# Patient Record
Sex: MALE | Race: WHITE | NOT HISPANIC OR LATINO | Employment: OTHER | URBAN - METROPOLITAN AREA
[De-identification: names, ages, dates, MRNs, and addresses within clinical notes are randomized per-mention and may not be internally consistent; named-entity substitution may affect disease eponyms.]

---

## 2023-05-23 LAB
ALANINE AMINOTRANSFERASE (SGPT) (U/L) IN SER/PLAS: 10 U/L (ref 10–52)
ALBUMIN (G/DL) IN SER/PLAS: 4.1 G/DL (ref 3.4–5)
ALKALINE PHOSPHATASE (U/L) IN SER/PLAS: 71 U/L (ref 33–136)
ANION GAP IN SER/PLAS: 12 MMOL/L (ref 10–20)
ASPARTATE AMINOTRANSFERASE (SGOT) (U/L) IN SER/PLAS: 14 U/L (ref 9–39)
BILIRUBIN TOTAL (MG/DL) IN SER/PLAS: 0.6 MG/DL (ref 0–1.2)
CALCIUM (MG/DL) IN SER/PLAS: 9 MG/DL (ref 8.6–10.3)
CARBON DIOXIDE, TOTAL (MMOL/L) IN SER/PLAS: 25 MMOL/L (ref 21–32)
CHLORIDE (MMOL/L) IN SER/PLAS: 108 MMOL/L (ref 98–107)
CHOLESTEROL (MG/DL) IN SER/PLAS: 149 MG/DL (ref 0–199)
CHOLESTEROL IN HDL (MG/DL) IN SER/PLAS: 42.3 MG/DL
CHOLESTEROL/HDL RATIO: 3.5
CREATININE (MG/DL) IN SER/PLAS: 0.92 MG/DL (ref 0.5–1.3)
GFR MALE: 84 ML/MIN/1.73M2
GLUCOSE (MG/DL) IN SER/PLAS: 84 MG/DL (ref 74–99)
LDL: 80 MG/DL (ref 0–99)
POTASSIUM (MMOL/L) IN SER/PLAS: 4.5 MMOL/L (ref 3.5–5.3)
PROSTATE SPECIFIC AG (NG/ML) IN SER/PLAS: 18.88 NG/ML (ref 0–4)
PROTEIN TOTAL: 7 G/DL (ref 6.4–8.2)
SODIUM (MMOL/L) IN SER/PLAS: 140 MMOL/L (ref 136–145)
TRIGLYCERIDE (MG/DL) IN SER/PLAS: 134 MG/DL (ref 0–149)
UREA NITROGEN (MG/DL) IN SER/PLAS: 23 MG/DL (ref 6–23)
VLDL: 27 MG/DL (ref 0–40)

## 2023-06-02 RX ORDER — GABAPENTIN 300 MG/1
300 CAPSULE ORAL 4 TIMES DAILY
COMMUNITY
Start: 2021-07-27

## 2023-06-02 RX ORDER — SIMVASTATIN 20 MG/1
1 TABLET, FILM COATED ORAL DAILY
COMMUNITY
Start: 2016-12-12 | End: 2023-06-05 | Stop reason: SDUPTHER

## 2023-06-02 RX ORDER — TAMSULOSIN HYDROCHLORIDE 0.4 MG/1
1 CAPSULE ORAL DAILY
COMMUNITY
Start: 2020-05-20 | End: 2024-02-08

## 2023-06-05 ENCOUNTER — OFFICE VISIT (OUTPATIENT)
Dept: PRIMARY CARE | Facility: CLINIC | Age: 78
End: 2023-06-05
Payer: MEDICARE

## 2023-06-05 VITALS
HEART RATE: 76 BPM | WEIGHT: 171.2 LBS | HEIGHT: 67 IN | BODY MASS INDEX: 26.87 KG/M2 | TEMPERATURE: 97.9 F | DIASTOLIC BLOOD PRESSURE: 60 MMHG | SYSTOLIC BLOOD PRESSURE: 120 MMHG | OXYGEN SATURATION: 95 %

## 2023-06-05 DIAGNOSIS — N13.8 BENIGN PROSTATIC HYPERPLASIA WITH URINARY OBSTRUCTION AND OTHER LOWER URINARY TRACT SYMPTOMS: ICD-10-CM

## 2023-06-05 DIAGNOSIS — R33.9 URINARY RETENTION WITH INCOMPLETE BLADDER EMPTYING: ICD-10-CM

## 2023-06-05 DIAGNOSIS — R97.20 ELEVATED PSA: ICD-10-CM

## 2023-06-05 DIAGNOSIS — Z00.00 MEDICARE ANNUAL WELLNESS VISIT, SUBSEQUENT: Primary | ICD-10-CM

## 2023-06-05 DIAGNOSIS — J30.1 NON-SEASONAL ALLERGIC RHINITIS DUE TO POLLEN: ICD-10-CM

## 2023-06-05 DIAGNOSIS — E78.00 HYPERCHOLESTEROLEMIA: ICD-10-CM

## 2023-06-05 DIAGNOSIS — G62.89 OTHER POLYNEUROPATHY: ICD-10-CM

## 2023-06-05 DIAGNOSIS — N40.1 BENIGN PROSTATIC HYPERPLASIA WITH URINARY OBSTRUCTION AND OTHER LOWER URINARY TRACT SYMPTOMS: ICD-10-CM

## 2023-06-05 PROBLEM — M54.50 ACUTE LUMBAR BACK PAIN: Status: ACTIVE | Noted: 2023-06-05

## 2023-06-05 PROBLEM — R26.2 DIFFICULTY WALKING: Status: ACTIVE | Noted: 2023-06-05

## 2023-06-05 PROBLEM — G62.9 PERIPHERAL NEUROPATHY: Status: ACTIVE | Noted: 2023-06-05

## 2023-06-05 PROBLEM — D35.1 PARATHYROID ADENOMA: Status: ACTIVE | Noted: 2023-06-05

## 2023-06-05 PROBLEM — M48.061 DEGENERATIVE LUMBAR SPINAL STENOSIS: Status: ACTIVE | Noted: 2023-06-05

## 2023-06-05 PROBLEM — J30.9 ALLERGIC RHINITIS: Status: ACTIVE | Noted: 2023-06-05

## 2023-06-05 PROCEDURE — G0439 PPPS, SUBSEQ VISIT: HCPCS | Performed by: INTERNAL MEDICINE

## 2023-06-05 PROCEDURE — 1170F FXNL STATUS ASSESSED: CPT | Performed by: INTERNAL MEDICINE

## 2023-06-05 PROCEDURE — 99214 OFFICE O/P EST MOD 30 MIN: CPT | Performed by: INTERNAL MEDICINE

## 2023-06-05 PROCEDURE — 1036F TOBACCO NON-USER: CPT | Performed by: INTERNAL MEDICINE

## 2023-06-05 RX ORDER — SIMVASTATIN 20 MG/1
20 TABLET, FILM COATED ORAL DAILY
Qty: 90 TABLET | Refills: 1 | Status: SHIPPED | OUTPATIENT
Start: 2023-06-05 | End: 2023-12-05 | Stop reason: SDUPTHER

## 2023-06-05 RX ORDER — FLUTICASONE PROPIONATE 50 MCG
1 SPRAY, SUSPENSION (ML) NASAL DAILY
COMMUNITY
End: 2023-06-05 | Stop reason: SDUPTHER

## 2023-06-05 RX ORDER — FLUTICASONE PROPIONATE 50 MCG
1 SPRAY, SUSPENSION (ML) NASAL DAILY
Qty: 48 G | Refills: 1 | Status: SHIPPED | OUTPATIENT
Start: 2023-06-05 | End: 2023-12-05 | Stop reason: SDUPTHER

## 2023-06-05 ASSESSMENT — ENCOUNTER SYMPTOMS
CONSTITUTIONAL NEGATIVE: 1
NEUROLOGICAL NEGATIVE: 1
RESPIRATORY NEGATIVE: 1
ALLERGIC/IMMUNOLOGIC NEGATIVE: 1
ENDOCRINE NEGATIVE: 1
OCCASIONAL FEELINGS OF UNSTEADINESS: 0
DEPRESSION: 0
MUSCULOSKELETAL NEGATIVE: 1
GASTROINTESTINAL NEGATIVE: 1
EYES NEGATIVE: 1
PSYCHIATRIC NEGATIVE: 1
CARDIOVASCULAR NEGATIVE: 1
HEMATOLOGIC/LYMPHATIC NEGATIVE: 1
LOSS OF SENSATION IN FEET: 0

## 2023-06-05 ASSESSMENT — ACTIVITIES OF DAILY LIVING (ADL)
BATHING: INDEPENDENT
DRESSING: INDEPENDENT
DOING_HOUSEWORK: INDEPENDENT
TAKING_MEDICATION: INDEPENDENT
GROCERY_SHOPPING: INDEPENDENT
MANAGING_FINANCES: INDEPENDENT

## 2023-06-05 NOTE — PROGRESS NOTES
"Subjective   Patient ID: Benjamin Mae is a 78 y.o. male who presents for 6 month follow up, Medicare Annual Wellness Visit Subsequent, and 6 month follow up .  Patient presents today in follow up re:   lipids, neuropathy and Medicare wellness.    Lipids have been well controlled on current medication regimen.  Denies myalgias or other side effects of medication.          Review of Systems   Constitutional: Negative.    HENT: Negative.     Eyes: Negative.    Respiratory: Negative.     Cardiovascular: Negative.    Gastrointestinal: Negative.    Endocrine: Negative.    Genitourinary: Negative.    Musculoskeletal: Negative.    Skin: Negative.    Allergic/Immunologic: Negative.    Neurological: Negative.    Hematological: Negative.    Psychiatric/Behavioral: Negative.         Objective     Blood pressure 120/60, pulse 76, temperature 36.6 °C (97.9 °F), temperature source Temporal, height 1.702 m (5' 7\"), weight 77.7 kg (171 lb 3.2 oz), SpO2 95 %.   Physical Exam  Vitals reviewed.   Constitutional:       Appearance: Normal appearance.   Neck:      Vascular: No carotid bruit.   Cardiovascular:      Rate and Rhythm: Normal rate and regular rhythm.      Pulses: Normal pulses.      Heart sounds: Normal heart sounds. No murmur heard.  Pulmonary:      Effort: Pulmonary effort is normal.      Breath sounds: Normal breath sounds. No wheezing or rales.   Abdominal:      General: Abdomen is flat. There is no distension.      Palpations: Abdomen is soft.      Tenderness: There is no abdominal tenderness.   Musculoskeletal:         General: Normal range of motion.      Cervical back: Normal range of motion and neck supple.   Skin:     General: Skin is warm and dry.   Neurological:      General: No focal deficit present.      Mental Status: He is alert and oriented to person, place, and time.   Psychiatric:         Mood and Affect: Mood normal.         Behavior: Behavior normal.         Assessment/Plan   Problem List Items Addressed " This Visit          Nervous    Peripheral neuropathy       Genitourinary    Benign prostatic hyperplasia with urinary obstruction and other lower urinary tract symptoms       Other    Allergic rhinitis    Hypercholesterolemia     Other Visit Diagnoses       Medicare annual wellness visit, subsequent    -  Primary          Medicare annual wellness completed with no new findings or issues identified.  Patient has documented advanced care planning and POA in place  LDL at goal on medication.  No adverse effects from medication.  Will continue present therapy and follow.  Neuropathy well compensated on current therapy.  Will continue present therapy and follow.  Rhinitis Sx well compensated on current therapy.  Will continue present therapy and follow.   BPH followed and managed by Urology.  PSA markedly elevated likely due to chronic Guadarrama as MRI 5 months ago was negative for any sign of malignancy.  He has visit with Urology next week.    Follow up in 6 months  Lab to be drawn 1 week prior to next office visit.

## 2023-10-20 PROBLEM — M51.369 DEGENERATION OF INTERVERTEBRAL DISC OF LUMBAR REGION: Status: ACTIVE | Noted: 2021-07-22

## 2023-10-20 PROBLEM — K64.1 SECOND DEGREE HEMORRHOIDS: Status: ACTIVE | Noted: 2018-03-02

## 2023-10-20 PROBLEM — M47.816 LUMBAR SPONDYLOSIS: Status: ACTIVE | Noted: 2023-10-20

## 2023-10-20 PROBLEM — K60.2 ANAL FISSURE: Status: ACTIVE | Noted: 2018-03-02

## 2023-10-20 PROBLEM — M51.36 DEGENERATION OF INTERVERTEBRAL DISC OF LUMBAR REGION: Status: ACTIVE | Noted: 2021-07-22

## 2023-10-20 RX ORDER — CIPROFLOXACIN 250 MG/1
1 TABLET, FILM COATED ORAL 2 TIMES DAILY
COMMUNITY
End: 2023-12-05 | Stop reason: ALTCHOICE

## 2023-10-23 ENCOUNTER — CLINICAL SUPPORT (OUTPATIENT)
Dept: UROLOGY | Facility: CLINIC | Age: 78
End: 2023-10-23
Payer: MEDICARE

## 2023-10-23 DIAGNOSIS — R33.9 URINARY RETENTION: ICD-10-CM

## 2023-10-23 PROCEDURE — 51702 INSERT TEMP BLADDER CATH: CPT | Performed by: UROLOGY

## 2023-10-23 NOTE — PROGRESS NOTES
Patient here today for 4 wk indwelling agarwal catheter change. Removed and replaced with 18 [place] Islander Agarwal 10cc balloon per DENNY Chilel. Patient prepped with iodine. Catheter draining clear yellow urine and able to be flushed appropriately. Patient tolerated well. Patient to return in 4 wks for next catheter change. Patient brings his own catheter supplies.  Carolina Leong LPN

## 2023-11-20 ENCOUNTER — OFFICE VISIT (OUTPATIENT)
Dept: UROLOGY | Facility: CLINIC | Age: 78
End: 2023-11-20
Payer: MEDICARE

## 2023-11-20 VITALS — SYSTOLIC BLOOD PRESSURE: 151 MMHG | HEART RATE: 94 BPM | DIASTOLIC BLOOD PRESSURE: 89 MMHG

## 2023-11-20 DIAGNOSIS — R33.9 URINARY RETENTION WITH INCOMPLETE BLADDER EMPTYING: Primary | ICD-10-CM

## 2023-11-20 PROCEDURE — 1159F MED LIST DOCD IN RCRD: CPT | Performed by: UROLOGY

## 2023-11-20 PROCEDURE — 1036F TOBACCO NON-USER: CPT | Performed by: UROLOGY

## 2023-11-20 PROCEDURE — 99212 OFFICE O/P EST SF 10 MIN: CPT | Performed by: UROLOGY

## 2023-11-20 NOTE — PROGRESS NOTES
78-year-old male with a history of a neurogenic bladder with chronic indwelling Guadarrama catheter which is changed every 4 weeks by our office personnel presents today for Guadarrama catheter exchange.  The patient denies having any difficulties with the Guadarrama catheter since he was last here he denies blood or burning on urination, his Guadarrama catheter was successfully changed by the office personnel and he is to return to the office for Guadarrama catheter exchange in 4 weeks.

## 2023-11-27 ENCOUNTER — LAB (OUTPATIENT)
Dept: LAB | Facility: LAB | Age: 78
End: 2023-11-27
Payer: MEDICARE

## 2023-11-27 DIAGNOSIS — R97.20 ELEVATED PSA: ICD-10-CM

## 2023-11-27 DIAGNOSIS — N40.1 BENIGN PROSTATIC HYPERPLASIA WITH URINARY OBSTRUCTION AND OTHER LOWER URINARY TRACT SYMPTOMS: ICD-10-CM

## 2023-11-27 DIAGNOSIS — N13.8 BENIGN PROSTATIC HYPERPLASIA WITH URINARY OBSTRUCTION AND OTHER LOWER URINARY TRACT SYMPTOMS: ICD-10-CM

## 2023-11-27 DIAGNOSIS — R33.9 URINARY RETENTION WITH INCOMPLETE BLADDER EMPTYING: ICD-10-CM

## 2023-11-27 DIAGNOSIS — E78.00 HYPERCHOLESTEROLEMIA: ICD-10-CM

## 2023-11-27 LAB
ALBUMIN SERPL BCP-MCNC: 4.4 G/DL (ref 3.4–5)
ALP SERPL-CCNC: 67 U/L (ref 33–136)
ALT SERPL W P-5'-P-CCNC: 11 U/L (ref 10–52)
ANION GAP SERPL CALC-SCNC: 12 MMOL/L (ref 10–20)
AST SERPL W P-5'-P-CCNC: 14 U/L (ref 9–39)
BILIRUB SERPL-MCNC: 0.5 MG/DL (ref 0–1.2)
BUN SERPL-MCNC: 24 MG/DL (ref 6–23)
CALCIUM SERPL-MCNC: 9.2 MG/DL (ref 8.6–10.3)
CHLORIDE SERPL-SCNC: 106 MMOL/L (ref 98–107)
CHOLEST SERPL-MCNC: 177 MG/DL (ref 0–199)
CHOLESTEROL/HDL RATIO: 3.9
CO2 SERPL-SCNC: 27 MMOL/L (ref 21–32)
CREAT SERPL-MCNC: 1.05 MG/DL (ref 0.5–1.3)
GFR SERPL CREATININE-BSD FRML MDRD: 72 ML/MIN/1.73M*2
GLUCOSE SERPL-MCNC: 81 MG/DL (ref 74–99)
HDLC SERPL-MCNC: 45.6 MG/DL
LDLC SERPL CALC-MCNC: 103 MG/DL
NON HDL CHOLESTEROL: 131 MG/DL (ref 0–149)
POTASSIUM SERPL-SCNC: 4.4 MMOL/L (ref 3.5–5.3)
PROT SERPL-MCNC: 7.1 G/DL (ref 6.4–8.2)
PSA SERPL-MCNC: 11.2 NG/ML
SODIUM SERPL-SCNC: 141 MMOL/L (ref 136–145)
TRIGL SERPL-MCNC: 144 MG/DL (ref 0–149)
VLDL: 29 MG/DL (ref 0–40)

## 2023-11-27 PROCEDURE — 84153 ASSAY OF PSA TOTAL: CPT

## 2023-11-27 PROCEDURE — 80061 LIPID PANEL: CPT

## 2023-11-27 PROCEDURE — 80053 COMPREHEN METABOLIC PANEL: CPT

## 2023-11-27 PROCEDURE — 36415 COLL VENOUS BLD VENIPUNCTURE: CPT

## 2023-12-05 ENCOUNTER — OFFICE VISIT (OUTPATIENT)
Dept: PRIMARY CARE | Facility: CLINIC | Age: 78
End: 2023-12-05
Payer: MEDICARE

## 2023-12-05 VITALS
HEART RATE: 80 BPM | DIASTOLIC BLOOD PRESSURE: 60 MMHG | TEMPERATURE: 97.4 F | WEIGHT: 178.6 LBS | SYSTOLIC BLOOD PRESSURE: 112 MMHG | BODY MASS INDEX: 27.97 KG/M2

## 2023-12-05 DIAGNOSIS — G62.89 OTHER POLYNEUROPATHY: ICD-10-CM

## 2023-12-05 DIAGNOSIS — E78.00 HYPERCHOLESTEROLEMIA: Primary | ICD-10-CM

## 2023-12-05 DIAGNOSIS — J30.1 NON-SEASONAL ALLERGIC RHINITIS DUE TO POLLEN: ICD-10-CM

## 2023-12-05 PROCEDURE — 1036F TOBACCO NON-USER: CPT | Performed by: INTERNAL MEDICINE

## 2023-12-05 PROCEDURE — 99214 OFFICE O/P EST MOD 30 MIN: CPT | Performed by: INTERNAL MEDICINE

## 2023-12-05 PROCEDURE — 1159F MED LIST DOCD IN RCRD: CPT | Performed by: INTERNAL MEDICINE

## 2023-12-05 RX ORDER — SIMVASTATIN 20 MG/1
20 TABLET, FILM COATED ORAL DAILY
Qty: 90 TABLET | Refills: 1 | Status: SHIPPED | OUTPATIENT
Start: 2023-12-05 | End: 2024-06-03 | Stop reason: SDUPTHER

## 2023-12-05 RX ORDER — FLUTICASONE PROPIONATE 50 MCG
1 SPRAY, SUSPENSION (ML) NASAL DAILY
Qty: 48 G | Refills: 1 | Status: SHIPPED | OUTPATIENT
Start: 2023-12-05 | End: 2024-06-03 | Stop reason: SDUPTHER

## 2023-12-05 NOTE — PROGRESS NOTES
"Subjective   Patient ID: Benjamin Mae is a 78 y.o. male who presents for 6 month follow up.  Patient presents today in follow up re:   lipids and neuropathy.    Patient presents in follow up regarding hyperlipidemia.  Lipids have been well controlled on current medication regimen.  Denies myalgias or other side effects of medication.           Review of Systems   Constitutional: Negative.    HENT: Negative.     Eyes: Negative.    Respiratory: Negative.     Cardiovascular: Negative.    Gastrointestinal: Negative.    Endocrine: Negative.    Genitourinary: Negative.    Musculoskeletal: Negative.    Skin: Negative.    Allergic/Immunologic: Negative.    Neurological: Negative.    Hematological: Negative.    Psychiatric/Behavioral: Negative.         Objective     Blood pressure 120/60, pulse 76, temperature 36.6 °C (97.9 °F), temperature source Temporal, height 1.702 m (5' 7\"), weight 77.7 kg (171 lb 3.2 oz), SpO2 95 %.   Physical Exam  Vitals reviewed.   Constitutional:       Appearance: Normal appearance.   Neck:      Vascular: No carotid bruit.   Cardiovascular:      Rate and Rhythm: Normal rate and regular rhythm.      Pulses: Normal pulses.      Heart sounds: Normal heart sounds. No murmur heard.  Pulmonary:      Effort: Pulmonary effort is normal.      Breath sounds: Normal breath sounds. No wheezing or rales.   Abdominal:      General: Abdomen is flat. There is no distension.      Palpations: Abdomen is soft.      Tenderness: There is no abdominal tenderness.   Musculoskeletal:         General: Normal range of motion.      Cervical back: Normal range of motion and neck supple.   Skin:     General: Skin is warm and dry.   Neurological:      General: No focal deficit present.      Mental Status: He is alert and oriented to person, place, and time.   Psychiatric:         Mood and Affect: Mood normal.         Behavior: Behavior normal.         Assessment/Plan   Problem List Items Addressed This Visit          Nervous " "   Peripheral neuropathy       Genitourinary    Benign prostatic hyperplasia with urinary obstruction and other lower urinary tract symptoms       Other    Allergic rhinitis    Hypercholesterolemia- primary     Other Visit Diagnoses                 LDL at goal on medication.  No adverse effects from medication.  Will continue present therapy and follow.  Neuropathy overall well compensated on current therapy per Neurology.  Last note reviewed which suggested taking 300 mg tid and 600 mg at hs.  Patient states he he is only taking 300 mg qid and he states he is \"doing okay.\"   He will continue present therapy and follow with Neurology.  Rhinitis Sx well compensated on current therapy.  Will continue present therapy and follow.   BPH followed and managed by Urology.  PSA markedly elevated likely due to chronic Guadarrama.  He had visit with Urology 2 weeks ago.    Follow up in 6 months  Lab to be drawn 1 week prior to next office visit.            "

## 2023-12-18 ENCOUNTER — CLINICAL SUPPORT (OUTPATIENT)
Dept: UROLOGY | Facility: CLINIC | Age: 78
End: 2023-12-18
Payer: MEDICARE

## 2023-12-18 DIAGNOSIS — R33.9 URINARY RETENTION: ICD-10-CM

## 2023-12-18 PROCEDURE — 51702 INSERT TEMP BLADDER CATH: CPT | Performed by: UROLOGY

## 2023-12-18 NOTE — PROGRESS NOTES
Patient here today for 4 wk indwelling agarwal catheter change. Removed and replaced with 16 Canadian Agarwal 10cc balloon per DENNY Chilel. Patient prepped with iodine. Catheter draining clear yellow urine and able to be flushed appropriately. Patient tolerated well. Patient to return in 4 wks for next catheter change.   Carolina Leong LPN

## 2024-01-15 ENCOUNTER — CLINICAL SUPPORT (OUTPATIENT)
Dept: UROLOGY | Facility: CLINIC | Age: 79
End: 2024-01-15
Payer: MEDICARE

## 2024-01-15 DIAGNOSIS — R33.9 URINARY RETENTION: ICD-10-CM

## 2024-01-15 PROCEDURE — 51702 INSERT TEMP BLADDER CATH: CPT | Performed by: UROLOGY

## 2024-01-15 NOTE — PROGRESS NOTES
Patient here today for 4 wk indwelling agarwal catheter change. Removed and replaced with 16 Bhutanese Agarwal 10cc balloon per DENNY Chilel. Patient prepped with iodine. Catheter draining clear yellow urine and able to be flushed appropriately. Patient tolerated well. Patient to return in 4 wks for next catheter change. Patient brings his own supplies.  Carolina Leong LPN

## 2024-02-12 ENCOUNTER — CLINICAL SUPPORT (OUTPATIENT)
Dept: UROLOGY | Facility: CLINIC | Age: 79
End: 2024-02-12
Payer: MEDICARE

## 2024-02-12 DIAGNOSIS — R33.9 URINARY RETENTION: ICD-10-CM

## 2024-02-12 PROCEDURE — 51702 INSERT TEMP BLADDER CATH: CPT | Performed by: UROLOGY

## 2024-02-12 RX ORDER — WATER 1000 ML/1000ML
10 INJECTION, SOLUTION INTRAVENOUS ONCE
Status: COMPLETED | OUTPATIENT
Start: 2024-02-12 | End: 2024-02-12

## 2024-02-12 RX ADMIN — WATER 10 ML: 1000 INJECTION, SOLUTION INTRAVENOUS at 09:48

## 2024-02-12 NOTE — PROGRESS NOTES
Patient here today for 4 wk indwelling agarwal catheter change. Removed and replaced with 16 Afghan Agarwal 10cc balloon per DENNY Chilel. Patient prepped with iodine. Catheter draining clear yellow urine and able to be flushed appropriately. Patient tolerated well. Patient to return in 4 wks for next catheter change.   Carolina Leong LPN

## 2024-03-11 ENCOUNTER — CLINICAL SUPPORT (OUTPATIENT)
Dept: UROLOGY | Facility: CLINIC | Age: 79
End: 2024-03-11
Payer: MEDICARE

## 2024-03-11 DIAGNOSIS — R33.9 URINE RETENTION: Primary | ICD-10-CM

## 2024-03-11 PROCEDURE — 51702 INSERT TEMP BLADDER CATH: CPT | Performed by: UROLOGY

## 2024-03-11 NOTE — PROGRESS NOTES
Patient here today for 4 wk indwelling agarwal catheter change. Removed and replaced with 16 Thai Agarwal 10cc balloon per MARGOT Dior. Patient prepped with iodine. Catheter draining clear yellow urine and able to be flushed appropriately. Patient tolerated well. Patient to return in 4 wks for next catheter change.     Nemo Hinton CMA

## 2024-03-12 RX ORDER — WATER 1000 ML/1000ML
20 INJECTION, SOLUTION INTRAVENOUS ONCE
Status: COMPLETED | OUTPATIENT
Start: 2024-03-12 | End: 2024-03-12

## 2024-03-12 RX ADMIN — WATER 20 ML: 1000 INJECTION, SOLUTION INTRAVENOUS at 11:40

## 2024-04-15 ENCOUNTER — CLINICAL SUPPORT (OUTPATIENT)
Dept: UROLOGY | Facility: CLINIC | Age: 79
End: 2024-04-15
Payer: MEDICARE

## 2024-04-15 DIAGNOSIS — R33.9 URINARY RETENTION: ICD-10-CM

## 2024-04-15 PROCEDURE — 51702 INSERT TEMP BLADDER CATH: CPT | Performed by: UROLOGY

## 2024-04-15 NOTE — PROGRESS NOTES
Patient here today for 4 wk indwelling agarwal catheter change. Removed and replaced with 16 Jordanian Agarwal 10cc balloon per DENNY Chilel. Patient prepped with iodine. Catheter draining clear yellow urine and able to be flushed appropriately. Patient tolerated well. Patient to return in 4 wks for next catheter change.   Carolina Leong LPN

## 2024-05-13 ENCOUNTER — CLINICAL SUPPORT (OUTPATIENT)
Dept: UROLOGY | Facility: CLINIC | Age: 79
End: 2024-05-13
Payer: MEDICARE

## 2024-05-13 DIAGNOSIS — N40.1 BENIGN PROSTATIC HYPERPLASIA WITH URINARY RETENTION: ICD-10-CM

## 2024-05-13 DIAGNOSIS — R33.8 BENIGN PROSTATIC HYPERPLASIA WITH URINARY RETENTION: ICD-10-CM

## 2024-05-13 PROCEDURE — 51702 INSERT TEMP BLADDER CATH: CPT | Performed by: UROLOGY

## 2024-05-13 NOTE — PROGRESS NOTES
Patient here today for 4 wk indwelling agarwal catheter change. Removed and replaced with 16 Georgian Agarwal 10cc balloon per DENNY Chilel. Patient prepped with iodine. Catheter draining clear yellow urine and able to be flushed appropriately. Patient tolerated well. Patient to return in 4 wks for next catheter change. Patient brings in his own catheter supplies.  Carolina Leong LPN

## 2024-05-22 ENCOUNTER — LAB (OUTPATIENT)
Dept: LAB | Facility: LAB | Age: 79
End: 2024-05-22
Payer: MEDICARE

## 2024-05-22 DIAGNOSIS — E78.00 HYPERCHOLESTEROLEMIA: ICD-10-CM

## 2024-05-22 LAB
ALBUMIN SERPL BCP-MCNC: 4.1 G/DL (ref 3.4–5)
ALP SERPL-CCNC: 66 U/L (ref 33–136)
ALT SERPL W P-5'-P-CCNC: 10 U/L (ref 10–52)
ANION GAP SERPL CALC-SCNC: 10 MMOL/L (ref 10–20)
AST SERPL W P-5'-P-CCNC: 12 U/L (ref 9–39)
BILIRUB SERPL-MCNC: 0.4 MG/DL (ref 0–1.2)
BUN SERPL-MCNC: 20 MG/DL (ref 6–23)
CALCIUM SERPL-MCNC: 9 MG/DL (ref 8.6–10.3)
CHLORIDE SERPL-SCNC: 109 MMOL/L (ref 98–107)
CHOLEST SERPL-MCNC: 144 MG/DL (ref 0–199)
CHOLESTEROL/HDL RATIO: 3.3
CO2 SERPL-SCNC: 28 MMOL/L (ref 21–32)
CREAT SERPL-MCNC: 0.9 MG/DL (ref 0.5–1.3)
EGFRCR SERPLBLD CKD-EPI 2021: 86 ML/MIN/1.73M*2
GLUCOSE SERPL-MCNC: 98 MG/DL (ref 74–99)
HDLC SERPL-MCNC: 43.1 MG/DL
LDLC SERPL CALC-MCNC: 80 MG/DL
NON HDL CHOLESTEROL: 101 MG/DL (ref 0–149)
POTASSIUM SERPL-SCNC: 4.8 MMOL/L (ref 3.5–5.3)
PROT SERPL-MCNC: 6.5 G/DL (ref 6.4–8.2)
SODIUM SERPL-SCNC: 142 MMOL/L (ref 136–145)
TRIGL SERPL-MCNC: 107 MG/DL (ref 0–149)
VLDL: 21 MG/DL (ref 0–40)

## 2024-05-22 PROCEDURE — 36415 COLL VENOUS BLD VENIPUNCTURE: CPT

## 2024-05-22 PROCEDURE — 80061 LIPID PANEL: CPT

## 2024-05-22 PROCEDURE — 80053 COMPREHEN METABOLIC PANEL: CPT

## 2024-06-03 ENCOUNTER — CLINICAL SUPPORT (OUTPATIENT)
Dept: UROLOGY | Facility: CLINIC | Age: 79
End: 2024-06-03
Payer: MEDICARE

## 2024-06-03 ENCOUNTER — OFFICE VISIT (OUTPATIENT)
Dept: PRIMARY CARE | Facility: CLINIC | Age: 79
End: 2024-06-03
Payer: MEDICARE

## 2024-06-03 VITALS
SYSTOLIC BLOOD PRESSURE: 110 MMHG | WEIGHT: 165.4 LBS | HEART RATE: 71 BPM | OXYGEN SATURATION: 90 % | BODY MASS INDEX: 25.91 KG/M2 | TEMPERATURE: 97.2 F | DIASTOLIC BLOOD PRESSURE: 58 MMHG

## 2024-06-03 DIAGNOSIS — N13.8 BENIGN PROSTATIC HYPERPLASIA WITH URINARY OBSTRUCTION AND OTHER LOWER URINARY TRACT SYMPTOMS: ICD-10-CM

## 2024-06-03 DIAGNOSIS — E78.00 HYPERCHOLESTEROLEMIA: ICD-10-CM

## 2024-06-03 DIAGNOSIS — N40.1 BENIGN PROSTATIC HYPERPLASIA WITH URINARY RETENTION: ICD-10-CM

## 2024-06-03 DIAGNOSIS — G62.89 OTHER POLYNEUROPATHY: ICD-10-CM

## 2024-06-03 DIAGNOSIS — Z00.00 MEDICARE ANNUAL WELLNESS VISIT, SUBSEQUENT: Primary | ICD-10-CM

## 2024-06-03 DIAGNOSIS — R33.8 BENIGN PROSTATIC HYPERPLASIA WITH URINARY RETENTION: ICD-10-CM

## 2024-06-03 DIAGNOSIS — N40.1 BENIGN PROSTATIC HYPERPLASIA WITH URINARY OBSTRUCTION AND OTHER LOWER URINARY TRACT SYMPTOMS: ICD-10-CM

## 2024-06-03 DIAGNOSIS — J30.1 NON-SEASONAL ALLERGIC RHINITIS DUE TO POLLEN: ICD-10-CM

## 2024-06-03 PROCEDURE — 99214 OFFICE O/P EST MOD 30 MIN: CPT | Performed by: INTERNAL MEDICINE

## 2024-06-03 PROCEDURE — 1036F TOBACCO NON-USER: CPT | Performed by: INTERNAL MEDICINE

## 2024-06-03 PROCEDURE — 1157F ADVNC CARE PLAN IN RCRD: CPT | Performed by: INTERNAL MEDICINE

## 2024-06-03 PROCEDURE — 51702 INSERT TEMP BLADDER CATH: CPT | Performed by: UROLOGY

## 2024-06-03 PROCEDURE — 1123F ACP DISCUSS/DSCN MKR DOCD: CPT | Performed by: INTERNAL MEDICINE

## 2024-06-03 PROCEDURE — 99397 PER PM REEVAL EST PAT 65+ YR: CPT | Performed by: INTERNAL MEDICINE

## 2024-06-03 PROCEDURE — G0442 ANNUAL ALCOHOL SCREEN 15 MIN: HCPCS | Performed by: INTERNAL MEDICINE

## 2024-06-03 PROCEDURE — 1170F FXNL STATUS ASSESSED: CPT | Performed by: INTERNAL MEDICINE

## 2024-06-03 PROCEDURE — G0439 PPPS, SUBSEQ VISIT: HCPCS | Performed by: INTERNAL MEDICINE

## 2024-06-03 PROCEDURE — 1158F ADVNC CARE PLAN TLK DOCD: CPT | Performed by: INTERNAL MEDICINE

## 2024-06-03 PROCEDURE — G0444 DEPRESSION SCREEN ANNUAL: HCPCS | Performed by: INTERNAL MEDICINE

## 2024-06-03 RX ORDER — IBUPROFEN 200 MG
TABLET ORAL EVERY 6 HOURS PRN
COMMUNITY

## 2024-06-03 RX ORDER — SIMVASTATIN 20 MG/1
20 TABLET, FILM COATED ORAL DAILY
Qty: 90 TABLET | Refills: 1 | Status: SHIPPED | OUTPATIENT
Start: 2024-06-03

## 2024-06-03 RX ORDER — FLUTICASONE PROPIONATE 50 MCG
1 SPRAY, SUSPENSION (ML) NASAL DAILY
Qty: 48 G | Refills: 1 | Status: SHIPPED | OUTPATIENT
Start: 2024-06-03

## 2024-06-03 ASSESSMENT — ACTIVITIES OF DAILY LIVING (ADL)
TAKING_MEDICATION: INDEPENDENT
DRESSING: INDEPENDENT
BATHING: INDEPENDENT
DOING_HOUSEWORK: INDEPENDENT
MANAGING_FINANCES: INDEPENDENT
GROCERY_SHOPPING: INDEPENDENT

## 2024-06-03 ASSESSMENT — ENCOUNTER SYMPTOMS
CARDIOVASCULAR NEGATIVE: 1
OCCASIONAL FEELINGS OF UNSTEADINESS: 0
PSYCHIATRIC NEGATIVE: 1
NEUROLOGICAL NEGATIVE: 1
EYES NEGATIVE: 1
ALLERGIC/IMMUNOLOGIC NEGATIVE: 1
CONSTITUTIONAL NEGATIVE: 1
HEMATOLOGIC/LYMPHATIC NEGATIVE: 1
GASTROINTESTINAL NEGATIVE: 1
LOSS OF SENSATION IN FEET: 1
MUSCULOSKELETAL NEGATIVE: 1
RESPIRATORY NEGATIVE: 1
ENDOCRINE NEGATIVE: 1
DEPRESSION: 0

## 2024-06-03 ASSESSMENT — PATIENT HEALTH QUESTIONNAIRE - PHQ9
SUM OF ALL RESPONSES TO PHQ9 QUESTIONS 1 AND 2: 0
2. FEELING DOWN, DEPRESSED OR HOPELESS: NOT AT ALL
1. LITTLE INTEREST OR PLEASURE IN DOING THINGS: NOT AT ALL

## 2024-06-03 NOTE — PROGRESS NOTES
Subjective   Patient ID: Benjamin Mae is a 79 y.o. male who presents for 6 month follow up  and Medicare Annual Wellness Visit Subsequent.  Patient presents in follow up regarding hyperlipidemia.  Lipids have been well controlled on current medication regimen.  Denies myalgias or other side effects of medication.          Review of Systems   Constitutional: Negative.    HENT: Negative.     Eyes: Negative.    Respiratory: Negative.     Cardiovascular: Negative.    Gastrointestinal: Negative.    Endocrine: Negative.    Genitourinary: Negative.    Musculoskeletal: Negative.    Skin: Negative.    Allergic/Immunologic: Negative.    Neurological: Negative.    Hematological: Negative.    Psychiatric/Behavioral: Negative.         Objective     Blood pressure 110/58, pulse 71, temperature 36.2 °C (97.2 °F), temperature source Temporal, weight 75 kg (165 lb 6.4 oz), SpO2 90%.   Physical Exam  Vitals reviewed.   Constitutional:       Appearance: Normal appearance.   HENT:      Head: Normocephalic and atraumatic.      Right Ear: Tympanic membrane and ear canal normal.      Left Ear: Tympanic membrane and ear canal normal.      Mouth/Throat:      Mouth: Mucous membranes are moist.      Pharynx: Oropharynx is clear.   Eyes:      Conjunctiva/sclera: Conjunctivae normal.      Pupils: Pupils are equal, round, and reactive to light.   Neck:      Vascular: No carotid bruit.   Cardiovascular:      Rate and Rhythm: Normal rate and regular rhythm.      Pulses: Normal pulses.      Heart sounds: Normal heart sounds. No murmur heard.  Pulmonary:      Effort: Pulmonary effort is normal.      Breath sounds: Normal breath sounds. No wheezing or rales.   Abdominal:      General: Abdomen is flat. There is no distension.      Palpations: Abdomen is soft.      Tenderness: There is no abdominal tenderness. There is no right CVA tenderness or left CVA tenderness.   Musculoskeletal:         General: Normal range of motion.      Cervical back:  Normal range of motion and neck supple.   Skin:     General: Skin is warm and dry.   Neurological:      General: No focal deficit present.      Mental Status: He is alert and oriented to person, place, and time.   Psychiatric:         Mood and Affect: Mood normal.         Behavior: Behavior normal.         Assessment/Plan   Problem List Items Addressed This Visit       Allergic rhinitis    Relevant Medications    fluticasone (Flonase) 50 mcg/actuation nasal spray    Benign prostatic hyperplasia with urinary obstruction and other lower urinary tract symptoms    Relevant Orders    Prostate Specific Antigen    Hypercholesterolemia    Relevant Medications    simvastatin (Zocor) 20 mg tablet    Other Relevant Orders    Comprehensive Metabolic Panel    Lipid Panel    Peripheral neuropathy     Other Visit Diagnoses       Medicare annual wellness visit, subsequent    -  Primary          Medicare annual wellness completed with no new findings or issues identified.  Patient has documented advanced care planning and POA in place  Depression screen is completed utilizing PHQ-2 with score of zero (0).  Alcohol screen is completed with no issues identified.  LDL at goal on medication.  No adverse effects from medication.  Will continue present therapy and follow.  BPH stable and he continues regular follow up with Urology re:  chronic catheter.  Neuropathy stable on current therapy.  He continues to follow up with Neurology for management.    Follow up in 6 months  Lab to be drawn 1 week prior to next office visit.

## 2024-06-03 NOTE — PROGRESS NOTES
Subjective   Patient ID: Benjamin Mae is a 79 y.o. male who presents for 6 month follow up  and Medicare Annual Wellness Visit Subsequent.  HPI    Review of Systems    Objective     Blood pressure 110/58, pulse 71, temperature 36.2 °C (97.2 °F), temperature source Temporal, weight 75 kg (165 lb 6.4 oz), SpO2 90%.   Physical Exam    Assessment/Plan   Problem List Items Addressed This Visit       Allergic rhinitis    Benign prostatic hyperplasia with urinary obstruction and other lower urinary tract symptoms    Hypercholesterolemia    Peripheral neuropathy     Other Visit Diagnoses       Medicare annual wellness visit, subsequent    -  Primary

## 2024-06-03 NOTE — PROGRESS NOTES
Patient here today for 3 wk indwelling agarwal catheter change. Removed and replaced with 16 [place] Agarwal 10cc balloon per DENNY Chilel. Patient prepped with iodine. Catheter draining clear yellow urine and able to be flushed appropriately. Patient tolerated well. Patient to return in 3 wks for next catheter change.   Carolina Leong LPN

## 2024-07-08 ENCOUNTER — APPOINTMENT (OUTPATIENT)
Dept: UROLOGY | Facility: CLINIC | Age: 79
End: 2024-07-08
Payer: MEDICARE

## 2024-07-08 DIAGNOSIS — N40.1 BENIGN PROSTATIC HYPERPLASIA WITH URINARY RETENTION: ICD-10-CM

## 2024-07-08 DIAGNOSIS — R33.8 BENIGN PROSTATIC HYPERPLASIA WITH URINARY RETENTION: ICD-10-CM

## 2024-07-08 PROCEDURE — 51702 INSERT TEMP BLADDER CATH: CPT | Performed by: UROLOGY

## 2024-07-08 NOTE — PROGRESS NOTES
Patient here today for 4 wk indwelling agarwal catheter change. Removed and replaced with 16 [place] African Agarwal 10cc balloon per DENNY Chilel. Patient prepped with iodine. Catheter draining clear yellow urine and able to be flushed appropriately. Patient tolerated well. Patient to return in 4 wks for next catheter change.   Carolina Leong LPN

## 2024-08-12 ENCOUNTER — APPOINTMENT (OUTPATIENT)
Dept: UROLOGY | Facility: CLINIC | Age: 79
End: 2024-08-12
Payer: MEDICARE

## 2024-08-12 DIAGNOSIS — R33.9 URINARY RETENTION WITH INCOMPLETE BLADDER EMPTYING: ICD-10-CM

## 2024-08-12 PROCEDURE — 51705 CHANGE OF BLADDER TUBE: CPT | Performed by: UROLOGY

## 2024-08-12 NOTE — PROGRESS NOTES
Patient here for 6 week catheter change. 16 F catheter with 10 ml balloon was removed with out difficulty. 16 F catheter with 10 ml l inserted with out difficulty. Clear yellow urine drained. Patient to return in 6 weeks for next change.

## 2024-09-09 ENCOUNTER — APPOINTMENT (OUTPATIENT)
Dept: UROLOGY | Facility: CLINIC | Age: 79
End: 2024-09-09
Payer: MEDICARE

## 2024-09-09 VITALS
BODY MASS INDEX: 24.44 KG/M2 | HEART RATE: 85 BPM | DIASTOLIC BLOOD PRESSURE: 86 MMHG | SYSTOLIC BLOOD PRESSURE: 147 MMHG | HEIGHT: 69 IN | WEIGHT: 165 LBS

## 2024-09-09 DIAGNOSIS — R33.9 URINARY RETENTION WITH INCOMPLETE BLADDER EMPTYING: ICD-10-CM

## 2024-09-09 NOTE — PROGRESS NOTES
Patient here for 4 week catheter change. 16 F catheter with 10 ml balloon was removed with out difficulty. 16 F catheter with 10 ml l inserted with out difficulty. Clear yellow urine drained. Patient to return in 4 weeks for next change.

## 2024-10-14 ENCOUNTER — APPOINTMENT (OUTPATIENT)
Dept: UROLOGY | Facility: CLINIC | Age: 79
End: 2024-10-14
Payer: MEDICARE

## 2024-10-14 VITALS — HEIGHT: 70 IN | BODY MASS INDEX: 23.62 KG/M2 | WEIGHT: 165 LBS

## 2024-10-14 DIAGNOSIS — R33.9 URINARY RETENTION WITH INCOMPLETE BLADDER EMPTYING: ICD-10-CM

## 2024-10-14 PROCEDURE — 1126F AMNT PAIN NOTED NONE PRSNT: CPT | Performed by: UROLOGY

## 2024-10-14 PROCEDURE — 99213 OFFICE O/P EST LOW 20 MIN: CPT | Performed by: UROLOGY

## 2024-10-14 PROCEDURE — 1123F ACP DISCUSS/DSCN MKR DOCD: CPT | Performed by: UROLOGY

## 2024-10-14 PROCEDURE — 1159F MED LIST DOCD IN RCRD: CPT | Performed by: UROLOGY

## 2024-10-14 PROCEDURE — 51702 INSERT TEMP BLADDER CATH: CPT | Performed by: UROLOGY

## 2024-10-14 PROCEDURE — 1036F TOBACCO NON-USER: CPT | Performed by: UROLOGY

## 2024-10-14 PROCEDURE — 1157F ADVNC CARE PLAN IN RCRD: CPT | Performed by: UROLOGY

## 2024-10-14 ASSESSMENT — PAIN SCALES - GENERAL: PAINLEVEL: 0-NO PAIN

## 2024-10-14 NOTE — PROGRESS NOTES
10/14/2024  Patient here for Agarwal catheter change    Catheter was changed without difficulty by nurse    We discussed elevated PSA, prostate biopsy negative x 2 in the past  We discussed continue monitoring PSA versus no more PSA due to the age  All the questions were answered, the patient expressed understanding and agreed to the plan.    Impression  BPH  Urinary retention  Chronic Agarwal catheter  Elevated PSA, negative biopsy x 2    Plan  Continue Agarwal catheter monthly change  No more PSA    Chief Complaint   Patient presents with    4 week cath change      Pt here for his 4 week cath change          Patient here today for 4 wk indwelling agarwal catheter change. Removed and replaced with 16 [place] sor[place] Agarwal 10cc balloon Patient prepped with iodine. Catheter draining clear yellow urine and able to be flushed appropriately. Patient tolerated well. Patient to return in 4 wks for next catheter change.   Liz Gallardo [facility]     Physical Exam     TODAYS LAB RESULTS:      ASSESSMENT&PLAN:      IMPRESSIONS:      11/20/2023 HBM  78-year-old male with a history of a neurogenic bladder with chronic indwelling Agarwal catheter which is changed every 4 weeks by our office personnel presents today for Agarwal catheter exchange.  The patient denies having any difficulties with the Agarwal catheter since he was last here he denies blood or burning on urination, his Agarwal catheter was successfully changed by the office personnel and he is to return to the office for Agarwal catheter exchange in 4 weeks.          PSA  11/27/2023 11.2  5/23/2023 18.88  7/28/2022 12.85  4/19 2021 8.49  7/19/2020 7.43  5/11/2020 6.85    ......

## 2024-11-15 ENCOUNTER — APPOINTMENT (OUTPATIENT)
Dept: UROLOGY | Facility: CLINIC | Age: 79
End: 2024-11-15
Payer: MEDICARE

## 2024-11-15 DIAGNOSIS — N13.8 BENIGN PROSTATIC HYPERPLASIA WITH URINARY OBSTRUCTION AND OTHER LOWER URINARY TRACT SYMPTOMS: ICD-10-CM

## 2024-11-15 DIAGNOSIS — N40.1 BENIGN PROSTATIC HYPERPLASIA WITH URINARY OBSTRUCTION AND OTHER LOWER URINARY TRACT SYMPTOMS: ICD-10-CM

## 2024-11-15 NOTE — PROGRESS NOTES
Patient here today for 4 wk indwelling agarwal catheter change. Removed and replaced with 16 Arabic Agarwal 10cc balloon.  Patient prepped with iodine. Catheter draining clear yellow urine and able to be flushed appropriately. Patient tolerated well. Patient to return in 4 wks for next catheter change.   Liz SOLOMON-CMA

## 2024-11-25 ENCOUNTER — LAB (OUTPATIENT)
Dept: LAB | Facility: LAB | Age: 79
End: 2024-11-25
Payer: MEDICARE

## 2024-11-25 DIAGNOSIS — N13.8 BENIGN PROSTATIC HYPERPLASIA WITH URINARY OBSTRUCTION AND OTHER LOWER URINARY TRACT SYMPTOMS: ICD-10-CM

## 2024-11-25 DIAGNOSIS — E78.00 HYPERCHOLESTEROLEMIA: ICD-10-CM

## 2024-11-25 DIAGNOSIS — N40.1 BENIGN PROSTATIC HYPERPLASIA WITH URINARY OBSTRUCTION AND OTHER LOWER URINARY TRACT SYMPTOMS: ICD-10-CM

## 2024-11-25 LAB
ALBUMIN SERPL BCP-MCNC: 4.2 G/DL (ref 3.4–5)
ALP SERPL-CCNC: 81 U/L (ref 33–136)
ALT SERPL W P-5'-P-CCNC: 10 U/L (ref 10–52)
ANION GAP SERPL CALC-SCNC: 11 MMOL/L (ref 10–20)
AST SERPL W P-5'-P-CCNC: 13 U/L (ref 9–39)
BILIRUB SERPL-MCNC: 0.5 MG/DL (ref 0–1.2)
BUN SERPL-MCNC: 23 MG/DL (ref 6–23)
CALCIUM SERPL-MCNC: 9 MG/DL (ref 8.6–10.3)
CHLORIDE SERPL-SCNC: 107 MMOL/L (ref 98–107)
CHOLEST SERPL-MCNC: 146 MG/DL (ref 0–199)
CHOLESTEROL/HDL RATIO: 3.2
CO2 SERPL-SCNC: 28 MMOL/L (ref 21–32)
CREAT SERPL-MCNC: 1.05 MG/DL (ref 0.5–1.3)
EGFRCR SERPLBLD CKD-EPI 2021: 71 ML/MIN/1.73M*2
GLUCOSE SERPL-MCNC: 95 MG/DL (ref 74–99)
HDLC SERPL-MCNC: 45.8 MG/DL
LDLC SERPL CALC-MCNC: 78 MG/DL
NON HDL CHOLESTEROL: 100 MG/DL (ref 0–149)
POTASSIUM SERPL-SCNC: 4.6 MMOL/L (ref 3.5–5.3)
PROT SERPL-MCNC: 6.9 G/DL (ref 6.4–8.2)
PSA SERPL-MCNC: 13.61 NG/ML
SODIUM SERPL-SCNC: 141 MMOL/L (ref 136–145)
TRIGL SERPL-MCNC: 112 MG/DL (ref 0–149)
VLDL: 22 MG/DL (ref 0–40)

## 2024-11-25 PROCEDURE — 84153 ASSAY OF PSA TOTAL: CPT

## 2024-11-25 PROCEDURE — 36415 COLL VENOUS BLD VENIPUNCTURE: CPT

## 2024-11-25 PROCEDURE — 80061 LIPID PANEL: CPT

## 2024-11-25 PROCEDURE — 80053 COMPREHEN METABOLIC PANEL: CPT

## 2024-12-03 ENCOUNTER — APPOINTMENT (OUTPATIENT)
Dept: PRIMARY CARE | Facility: CLINIC | Age: 79
End: 2024-12-03
Payer: MEDICARE

## 2024-12-03 VITALS
OXYGEN SATURATION: 96 % | DIASTOLIC BLOOD PRESSURE: 60 MMHG | BODY MASS INDEX: 24.13 KG/M2 | HEART RATE: 73 BPM | SYSTOLIC BLOOD PRESSURE: 120 MMHG | WEIGHT: 168.2 LBS | TEMPERATURE: 97.8 F

## 2024-12-03 DIAGNOSIS — Z28.21 INFLUENZA VACCINATION DECLINED BY PATIENT: ICD-10-CM

## 2024-12-03 DIAGNOSIS — E78.00 HYPERCHOLESTEROLEMIA: Primary | ICD-10-CM

## 2024-12-03 DIAGNOSIS — N40.1 BENIGN PROSTATIC HYPERPLASIA WITH URINARY OBSTRUCTION AND OTHER LOWER URINARY TRACT SYMPTOMS: ICD-10-CM

## 2024-12-03 DIAGNOSIS — G62.89 OTHER POLYNEUROPATHY: ICD-10-CM

## 2024-12-03 DIAGNOSIS — M48.061 DEGENERATIVE LUMBAR SPINAL STENOSIS: ICD-10-CM

## 2024-12-03 DIAGNOSIS — R26.2 DIFFICULTY WALKING: ICD-10-CM

## 2024-12-03 DIAGNOSIS — N13.8 BENIGN PROSTATIC HYPERPLASIA WITH URINARY OBSTRUCTION AND OTHER LOWER URINARY TRACT SYMPTOMS: ICD-10-CM

## 2024-12-03 DIAGNOSIS — J30.1 NON-SEASONAL ALLERGIC RHINITIS DUE TO POLLEN: ICD-10-CM

## 2024-12-03 PROCEDURE — G2211 COMPLEX E/M VISIT ADD ON: HCPCS | Performed by: INTERNAL MEDICINE

## 2024-12-03 PROCEDURE — 1157F ADVNC CARE PLAN IN RCRD: CPT | Performed by: INTERNAL MEDICINE

## 2024-12-03 PROCEDURE — 1159F MED LIST DOCD IN RCRD: CPT | Performed by: INTERNAL MEDICINE

## 2024-12-03 PROCEDURE — 99214 OFFICE O/P EST MOD 30 MIN: CPT | Performed by: INTERNAL MEDICINE

## 2024-12-03 PROCEDURE — 1123F ACP DISCUSS/DSCN MKR DOCD: CPT | Performed by: INTERNAL MEDICINE

## 2024-12-03 RX ORDER — SIMVASTATIN 20 MG/1
20 TABLET, FILM COATED ORAL DAILY
Qty: 90 TABLET | Refills: 1 | Status: SHIPPED | OUTPATIENT
Start: 2024-12-03

## 2024-12-03 RX ORDER — FLUTICASONE PROPIONATE 50 MCG
1 SPRAY, SUSPENSION (ML) NASAL DAILY
Qty: 48 G | Refills: 1 | Status: SHIPPED | OUTPATIENT
Start: 2024-12-03

## 2024-12-03 ASSESSMENT — ENCOUNTER SYMPTOMS
EYES NEGATIVE: 1
HEMATOLOGIC/LYMPHATIC NEGATIVE: 1
NEUROLOGICAL NEGATIVE: 1
ENDOCRINE NEGATIVE: 1
CARDIOVASCULAR NEGATIVE: 1
RESPIRATORY NEGATIVE: 1
PSYCHIATRIC NEGATIVE: 1
GASTROINTESTINAL NEGATIVE: 1
MUSCULOSKELETAL NEGATIVE: 1
ALLERGIC/IMMUNOLOGIC NEGATIVE: 1
CONSTITUTIONAL NEGATIVE: 1

## 2024-12-16 ENCOUNTER — APPOINTMENT (OUTPATIENT)
Dept: UROLOGY | Facility: CLINIC | Age: 79
End: 2024-12-16
Payer: MEDICARE

## 2024-12-16 DIAGNOSIS — R33.9 URINARY RETENTION WITH INCOMPLETE BLADDER EMPTYING: ICD-10-CM

## 2024-12-16 PROCEDURE — 99024 POSTOP FOLLOW-UP VISIT: CPT | Performed by: UROLOGY

## 2024-12-16 PROCEDURE — 51702 INSERT TEMP BLADDER CATH: CPT | Performed by: UROLOGY

## 2024-12-16 PROCEDURE — 1157F ADVNC CARE PLAN IN RCRD: CPT | Performed by: UROLOGY

## 2024-12-16 PROCEDURE — 1123F ACP DISCUSS/DSCN MKR DOCD: CPT | Performed by: UROLOGY

## 2024-12-16 NOTE — PROGRESS NOTES
Patient here today for 4 wk indwelling agarwal catheter change. Removed and replaced with 16 Congolese Agarwal 10cc balloon.  Patient prepped with iodine. Catheter draining clear yellow urine and able to be flushed appropriately. Patient tolerated well. Patient to return in 4 wks for next catheter change.   Liz SOLOMON-CMA

## 2025-01-13 ENCOUNTER — APPOINTMENT (OUTPATIENT)
Dept: UROLOGY | Facility: CLINIC | Age: 80
End: 2025-01-13
Payer: MEDICARE

## 2025-01-13 DIAGNOSIS — N40.1 BENIGN PROSTATIC HYPERPLASIA WITH URINARY OBSTRUCTION AND OTHER LOWER URINARY TRACT SYMPTOMS: ICD-10-CM

## 2025-01-13 DIAGNOSIS — N13.8 BENIGN PROSTATIC HYPERPLASIA WITH URINARY OBSTRUCTION AND OTHER LOWER URINARY TRACT SYMPTOMS: ICD-10-CM

## 2025-01-13 PROCEDURE — 99024 POSTOP FOLLOW-UP VISIT: CPT | Performed by: UROLOGY

## 2025-01-13 PROCEDURE — 1123F ACP DISCUSS/DSCN MKR DOCD: CPT | Performed by: UROLOGY

## 2025-01-13 PROCEDURE — 1157F ADVNC CARE PLAN IN RCRD: CPT | Performed by: UROLOGY

## 2025-01-13 PROCEDURE — 51702 INSERT TEMP BLADDER CATH: CPT | Performed by: UROLOGY

## 2025-01-13 NOTE — PROGRESS NOTES
Patient here today for 4 wk indwelling agarwal catheter change. Removed and replaced with 16 Moldovan Agarwal 10cc balloon.  Patient prepped with iodine. Catheter draining clear yellow urine and able to be flushed appropriately. Patient tolerated well. Patient to return in 4 wks for next catheter change.   Liz SOLOMON-CMA

## 2025-02-10 ENCOUNTER — APPOINTMENT (OUTPATIENT)
Dept: UROLOGY | Facility: CLINIC | Age: 80
End: 2025-02-10
Payer: MEDICARE

## 2025-02-10 VITALS
SYSTOLIC BLOOD PRESSURE: 155 MMHG | WEIGHT: 165 LBS | HEART RATE: 98 BPM | BODY MASS INDEX: 23.62 KG/M2 | HEIGHT: 70 IN | DIASTOLIC BLOOD PRESSURE: 83 MMHG

## 2025-02-10 DIAGNOSIS — R33.9 URINARY RETENTION WITH INCOMPLETE BLADDER EMPTYING: Primary | ICD-10-CM

## 2025-02-10 PROCEDURE — 1159F MED LIST DOCD IN RCRD: CPT | Performed by: UROLOGY

## 2025-02-10 PROCEDURE — 1157F ADVNC CARE PLAN IN RCRD: CPT | Performed by: UROLOGY

## 2025-02-10 PROCEDURE — 99024 POSTOP FOLLOW-UP VISIT: CPT | Performed by: UROLOGY

## 2025-02-10 PROCEDURE — 1123F ACP DISCUSS/DSCN MKR DOCD: CPT | Performed by: UROLOGY

## 2025-02-10 PROCEDURE — 51702 INSERT TEMP BLADDER CATH: CPT | Performed by: UROLOGY

## 2025-03-10 ENCOUNTER — APPOINTMENT (OUTPATIENT)
Dept: UROLOGY | Facility: CLINIC | Age: 80
End: 2025-03-10
Payer: MEDICARE

## 2025-03-10 VITALS — SYSTOLIC BLOOD PRESSURE: 119 MMHG | DIASTOLIC BLOOD PRESSURE: 71 MMHG | HEART RATE: 99 BPM

## 2025-03-10 DIAGNOSIS — R33.9 URINARY RETENTION: ICD-10-CM

## 2025-03-10 DIAGNOSIS — R33.9 URINARY RETENTION WITH INCOMPLETE BLADDER EMPTYING: ICD-10-CM

## 2025-03-10 PROCEDURE — 51702 INSERT TEMP BLADDER CATH: CPT | Performed by: UROLOGY

## 2025-03-10 NOTE — PROGRESS NOTES
#16 fr catheter changed. Prepped with iodine. Flushed with return of clear yellow urine. Tolerated well

## 2025-03-26 ENCOUNTER — TELEPHONE (OUTPATIENT)
Dept: UROLOGY | Facility: CLINIC | Age: 80
End: 2025-03-26
Payer: MEDICARE

## 2025-04-07 ENCOUNTER — APPOINTMENT (OUTPATIENT)
Dept: UROLOGY | Facility: CLINIC | Age: 80
End: 2025-04-07
Payer: MEDICARE

## 2025-04-14 ENCOUNTER — APPOINTMENT (OUTPATIENT)
Dept: UROLOGY | Facility: CLINIC | Age: 80
End: 2025-04-14
Payer: MEDICARE

## 2025-04-14 VITALS — SYSTOLIC BLOOD PRESSURE: 158 MMHG | DIASTOLIC BLOOD PRESSURE: 82 MMHG | HEART RATE: 71 BPM

## 2025-04-14 DIAGNOSIS — R33.9 URINARY RETENTION: ICD-10-CM

## 2025-04-14 PROCEDURE — 51702 INSERT TEMP BLADDER CATH: CPT | Performed by: UROLOGY

## 2025-05-12 ENCOUNTER — APPOINTMENT (OUTPATIENT)
Dept: UROLOGY | Facility: CLINIC | Age: 80
End: 2025-05-12
Payer: MEDICARE

## 2025-05-12 VITALS — HEART RATE: 82 BPM | SYSTOLIC BLOOD PRESSURE: 144 MMHG | DIASTOLIC BLOOD PRESSURE: 85 MMHG

## 2025-05-12 DIAGNOSIS — R33.9 URINARY RETENTION: ICD-10-CM

## 2025-05-12 PROCEDURE — 51703 INSERT BLADDER CATH COMPLEX: CPT | Performed by: UROLOGY

## 2025-05-12 NOTE — PROGRESS NOTES
#16 fr catheter changed. Prepped with iodine. Flushed for return of clear yellow urine. Tolerated well

## 2025-05-21 LAB
ALBUMIN SERPL-MCNC: 4.1 G/DL (ref 3.6–5.1)
ALP SERPL-CCNC: 71 U/L (ref 35–144)
ALT SERPL-CCNC: 9 U/L (ref 9–46)
ANION GAP SERPL CALCULATED.4IONS-SCNC: 9 MMOL/L (CALC) (ref 7–17)
AST SERPL-CCNC: 12 U/L (ref 10–35)
BILIRUB SERPL-MCNC: 0.4 MG/DL (ref 0.2–1.2)
BUN SERPL-MCNC: 23 MG/DL (ref 7–25)
CALCIUM SERPL-MCNC: 9 MG/DL (ref 8.6–10.3)
CHLORIDE SERPL-SCNC: 109 MMOL/L (ref 98–110)
CHOLEST SERPL-MCNC: 155 MG/DL
CHOLEST/HDLC SERPL: 3.3 (CALC)
CO2 SERPL-SCNC: 25 MMOL/L (ref 20–32)
CREAT SERPL-MCNC: 0.87 MG/DL (ref 0.7–1.22)
EGFRCR SERPLBLD CKD-EPI 2021: 86 ML/MIN/1.73M2
GLUCOSE SERPL-MCNC: 86 MG/DL (ref 65–99)
HDLC SERPL-MCNC: 47 MG/DL
LDLC SERPL CALC-MCNC: 83 MG/DL (CALC)
NONHDLC SERPL-MCNC: 108 MG/DL (CALC)
POTASSIUM SERPL-SCNC: 4.9 MMOL/L (ref 3.5–5.3)
PROT SERPL-MCNC: 6.7 G/DL (ref 6.1–8.1)
SODIUM SERPL-SCNC: 143 MMOL/L (ref 135–146)
TRIGL SERPL-MCNC: 158 MG/DL

## 2025-06-02 ENCOUNTER — APPOINTMENT (OUTPATIENT)
Dept: PRIMARY CARE | Facility: CLINIC | Age: 80
End: 2025-06-02
Payer: MEDICARE

## 2025-06-02 VITALS
BODY MASS INDEX: 24.74 KG/M2 | OXYGEN SATURATION: 95 % | TEMPERATURE: 97.5 F | WEIGHT: 172.4 LBS | HEART RATE: 72 BPM | DIASTOLIC BLOOD PRESSURE: 50 MMHG | SYSTOLIC BLOOD PRESSURE: 90 MMHG

## 2025-06-02 DIAGNOSIS — J30.1 NON-SEASONAL ALLERGIC RHINITIS DUE TO POLLEN: ICD-10-CM

## 2025-06-02 DIAGNOSIS — E78.00 HYPERCHOLESTEROLEMIA: ICD-10-CM

## 2025-06-02 DIAGNOSIS — N13.8 BENIGN PROSTATIC HYPERPLASIA WITH URINARY OBSTRUCTION AND OTHER LOWER URINARY TRACT SYMPTOMS: ICD-10-CM

## 2025-06-02 DIAGNOSIS — N40.1 BENIGN PROSTATIC HYPERPLASIA WITH URINARY OBSTRUCTION AND OTHER LOWER URINARY TRACT SYMPTOMS: ICD-10-CM

## 2025-06-02 DIAGNOSIS — Z00.00 MEDICARE ANNUAL WELLNESS VISIT, SUBSEQUENT: Primary | ICD-10-CM

## 2025-06-02 DIAGNOSIS — G62.89 OTHER POLYNEUROPATHY: ICD-10-CM

## 2025-06-02 PROCEDURE — G0444 DEPRESSION SCREEN ANNUAL: HCPCS | Performed by: INTERNAL MEDICINE

## 2025-06-02 PROCEDURE — 1036F TOBACCO NON-USER: CPT | Performed by: INTERNAL MEDICINE

## 2025-06-02 PROCEDURE — 99214 OFFICE O/P EST MOD 30 MIN: CPT | Performed by: INTERNAL MEDICINE

## 2025-06-02 PROCEDURE — G0439 PPPS, SUBSEQ VISIT: HCPCS | Performed by: INTERNAL MEDICINE

## 2025-06-02 PROCEDURE — 99397 PER PM REEVAL EST PAT 65+ YR: CPT | Performed by: INTERNAL MEDICINE

## 2025-06-02 PROCEDURE — 1159F MED LIST DOCD IN RCRD: CPT | Performed by: INTERNAL MEDICINE

## 2025-06-02 PROCEDURE — G0442 ANNUAL ALCOHOL SCREEN 15 MIN: HCPCS | Performed by: INTERNAL MEDICINE

## 2025-06-02 PROCEDURE — 1158F ADVNC CARE PLAN TLK DOCD: CPT | Performed by: INTERNAL MEDICINE

## 2025-06-02 PROCEDURE — 1170F FXNL STATUS ASSESSED: CPT | Performed by: INTERNAL MEDICINE

## 2025-06-02 RX ORDER — FLUTICASONE PROPIONATE 50 MCG
1 SPRAY, SUSPENSION (ML) NASAL DAILY
Qty: 48 G | Refills: 1 | Status: SHIPPED | OUTPATIENT
Start: 2025-06-02

## 2025-06-02 RX ORDER — SIMVASTATIN 20 MG/1
20 TABLET, FILM COATED ORAL DAILY
Qty: 90 TABLET | Refills: 1 | Status: SHIPPED | OUTPATIENT
Start: 2025-06-02

## 2025-06-02 ASSESSMENT — ENCOUNTER SYMPTOMS
GASTROINTESTINAL NEGATIVE: 1
PSYCHIATRIC NEGATIVE: 1
MUSCULOSKELETAL NEGATIVE: 1
ENDOCRINE NEGATIVE: 1
CARDIOVASCULAR NEGATIVE: 1
RESPIRATORY NEGATIVE: 1
CONSTITUTIONAL NEGATIVE: 1
HEMATOLOGIC/LYMPHATIC NEGATIVE: 1
NEUROLOGICAL NEGATIVE: 1
ALLERGIC/IMMUNOLOGIC NEGATIVE: 1
LOSS OF SENSATION IN FEET: 1
EYES NEGATIVE: 1
OCCASIONAL FEELINGS OF UNSTEADINESS: 1
DEPRESSION: 0

## 2025-06-02 ASSESSMENT — ACTIVITIES OF DAILY LIVING (ADL)
DRESSING: INDEPENDENT
TAKING_MEDICATION: INDEPENDENT
BATHING: INDEPENDENT
MANAGING_FINANCES: INDEPENDENT
GROCERY_SHOPPING: INDEPENDENT
DOING_HOUSEWORK: INDEPENDENT

## 2025-06-02 NOTE — PROGRESS NOTES
Subjective   Patient ID: Benjamin Mae is a 80 y.o. male who presents for 6 month follow up  and Medicare Annual Wellness Visit Subsequent.  Patient presents in follow up regarding hyperlipidemia.  Lipids have been well controlled on current medication regimen.  Denies myalgias or other side effects of medication.          Review of Systems   Constitutional: Negative.    HENT: Negative.     Eyes: Negative.    Respiratory: Negative.     Cardiovascular: Negative.    Gastrointestinal: Negative.    Endocrine: Negative.    Genitourinary: Negative.    Musculoskeletal: Negative.    Skin: Negative.    Allergic/Immunologic: Negative.    Neurological: Negative.    Hematological: Negative.    Psychiatric/Behavioral: Negative.         Objective     Blood pressure 90/50, pulse 72, temperature 36.4 °C (97.5 °F), temperature source Temporal, weight 78.2 kg (172 lb 6.4 oz), SpO2 95%.   Physical Exam  Vitals reviewed.   Constitutional:       Appearance: Normal appearance.   HENT:      Head: Normocephalic and atraumatic.      Right Ear: Tympanic membrane and ear canal normal.      Left Ear: Tympanic membrane and ear canal normal.      Mouth/Throat:      Mouth: Mucous membranes are moist.      Pharynx: Oropharynx is clear.   Eyes:      Conjunctiva/sclera: Conjunctivae normal.      Pupils: Pupils are equal, round, and reactive to light.   Neck:      Vascular: No carotid bruit.   Cardiovascular:      Rate and Rhythm: Normal rate and regular rhythm.      Pulses: Normal pulses.      Heart sounds: Normal heart sounds. No murmur heard.  Pulmonary:      Effort: Pulmonary effort is normal.      Breath sounds: Normal breath sounds. No wheezing or rales.   Abdominal:      General: Abdomen is flat. There is no distension.      Palpations: Abdomen is soft.      Tenderness: There is no abdominal tenderness. There is no right CVA tenderness or left CVA tenderness.   Musculoskeletal:         General: Normal range of motion.      Cervical back:  Normal range of motion and neck supple.   Skin:     General: Skin is warm and dry.   Neurological:      General: No focal deficit present.      Mental Status: He is alert and oriented to person, place, and time.   Psychiatric:         Mood and Affect: Mood normal.         Behavior: Behavior normal.         Assessment/Plan   Problem List Items Addressed This Visit       Allergic rhinitis    Relevant Medications    fluticasone (Flonase) 50 mcg/actuation nasal spray    Benign prostatic hyperplasia with urinary obstruction and other lower urinary tract symptoms    Relevant Orders    Prostate Specific Antigen    Hypercholesterolemia    Relevant Medications    simvastatin (Zocor) 20 mg tablet    Other Relevant Orders    Comprehensive Metabolic Panel    Lipid Panel    Peripheral neuropathy     Other Visit Diagnoses         Medicare annual wellness visit, subsequent    -  Primary          Medicare annual wellness completed with no new findings or issues identified.  Patient is up to date on all age-appropriate preventive measures.  Patient has documented advanced care planning and POA in place  Depression screen is completed utilizing PHQ-2 with score of zero (0).  Alcohol use screen is completed taking a minimum of 5 minutes with no issues identified.  Physical exam completed as documented with no new findings.   LDL at goal on medication.  No adverse effects from medication.  Will continue present therapy and follow.  Rhinitis Sx overall well compensated on current therapy.  Will continue present therapy and follow.   BPH stable and he continues regular follow up with Urology re:  chronic catheter.    Neuropathy stable on current therapy.  He continues to follow up with Neurology for management.      Follow up in 6 months  Lab to be drawn 1 week prior to next office visit.

## 2025-06-16 ENCOUNTER — APPOINTMENT (OUTPATIENT)
Dept: UROLOGY | Facility: CLINIC | Age: 80
End: 2025-06-16
Payer: MEDICARE

## 2025-06-16 VITALS — SYSTOLIC BLOOD PRESSURE: 138 MMHG | DIASTOLIC BLOOD PRESSURE: 72 MMHG | HEART RATE: 66 BPM

## 2025-06-16 DIAGNOSIS — R33.9 URINARY RETENTION: ICD-10-CM

## 2025-06-16 PROCEDURE — 51703 INSERT BLADDER CATH COMPLEX: CPT | Performed by: UROLOGY

## 2025-06-16 NOTE — PROGRESS NOTES
#16 fr catheter changed. Prepped with iodine. Flushed for yellow urine with moderate sediment. Tolerated well

## 2025-07-19 ENCOUNTER — HOSPITAL ENCOUNTER (EMERGENCY)
Facility: HOSPITAL | Age: 80
Discharge: HOME | End: 2025-07-19
Attending: EMERGENCY MEDICINE
Payer: MEDICARE

## 2025-07-19 VITALS
HEIGHT: 69 IN | TEMPERATURE: 98.4 F | SYSTOLIC BLOOD PRESSURE: 117 MMHG | DIASTOLIC BLOOD PRESSURE: 70 MMHG | HEART RATE: 78 BPM | RESPIRATION RATE: 20 BRPM | BODY MASS INDEX: 25.18 KG/M2 | WEIGHT: 170 LBS | OXYGEN SATURATION: 98 %

## 2025-07-19 DIAGNOSIS — Z46.6 CATHETER (URINE) CHANGE REQUIRED: Primary | ICD-10-CM

## 2025-07-19 DIAGNOSIS — T83.091A OBSTRUCTION OF FOLEY CATHETER, INITIAL ENCOUNTER: ICD-10-CM

## 2025-07-19 PROCEDURE — 51702 INSERT TEMP BLADDER CATH: CPT

## 2025-07-19 PROCEDURE — 99283 EMERGENCY DEPT VISIT LOW MDM: CPT | Performed by: EMERGENCY MEDICINE

## 2025-07-19 PROCEDURE — 51798 US URINE CAPACITY MEASURE: CPT

## 2025-07-19 ASSESSMENT — PAIN DESCRIPTION - PAIN TYPE: TYPE: ACUTE PAIN

## 2025-07-19 ASSESSMENT — PAIN DESCRIPTION - DESCRIPTORS: DESCRIPTORS: PRESSURE

## 2025-07-19 ASSESSMENT — LIFESTYLE VARIABLES
TOTAL SCORE: 0
HAVE YOU EVER FELT YOU SHOULD CUT DOWN ON YOUR DRINKING: NO
EVER FELT BAD OR GUILTY ABOUT YOUR DRINKING: NO
HAVE PEOPLE ANNOYED YOU BY CRITICIZING YOUR DRINKING: NO
EVER HAD A DRINK FIRST THING IN THE MORNING TO STEADY YOUR NERVES TO GET RID OF A HANGOVER: NO

## 2025-07-19 ASSESSMENT — PAIN DESCRIPTION - FREQUENCY: FREQUENCY: CONSTANT/CONTINUOUS

## 2025-07-19 ASSESSMENT — PAIN SCALES - GENERAL: PAINLEVEL_OUTOF10: 5 - MODERATE PAIN

## 2025-07-19 ASSESSMENT — PAIN - FUNCTIONAL ASSESSMENT: PAIN_FUNCTIONAL_ASSESSMENT: 0-10

## 2025-07-19 NOTE — ED TRIAGE NOTES
Patient presents to the ED for catheter issues. Patient reports that he noticed around 3 am that his catheter was not draining. Patient reports that this has happened before and he just needs his catheter changed. Patient reports pressure on his bladder with no other complaints at this time

## 2025-07-19 NOTE — ED PROVIDER NOTES
HPI   Chief Complaint   Patient presents with    Catheter Problem       80-year-old female presents with a Guadarrama catheter issue.  He has had a chronic Guadarrama for 4 to 5 years.  He follows regularly with Dr. Benson.  He gets his Guadarrama changed monthly.  He was scheduled to have a change this coming Monday.  He states that has not been flowing since around 3 AM.  This happens to him not infrequently.  He is usually able to fix it himself at home but he was unable to flush it.  Typically when this happens, he just needs a new Guadarrama catheter.  He is requesting new Guadarrama catheter at this time.  Denies abdominal pain or flank pain.  Denies any symptoms of infection.  No fever or chills.  No dysuria.              Patient History   Medical History[1]  Surgical History[2]  Family History[3]  Social History[4]    Physical Exam   ED Triage Vitals [07/19/25 0756]   Temperature Heart Rate Respirations BP   36.2 °C (97.2 °F) 66 18 129/69      Pulse Ox Temp Source Heart Rate Source Patient Position   95 % Temporal Monitor --      BP Location FiO2 (%)     -- --       Physical Exam  Constitutional:       General: He is not in acute distress.  HENT:      Head: Normocephalic and atraumatic.      Mouth/Throat:      Mouth: Mucous membranes are moist.     Cardiovascular:      Rate and Rhythm: Normal rate.      Heart sounds: Normal heart sounds.   Pulmonary:      Effort: Pulmonary effort is normal.   Abdominal:      Palpations: Abdomen is soft.      Tenderness: There is no abdominal tenderness.     Skin:     General: Skin is warm and dry.      Capillary Refill: Capillary refill takes less than 2 seconds.      Findings: No rash.     Neurological:      General: No focal deficit present.      Mental Status: He is alert and oriented to person, place, and time.     Psychiatric:         Mood and Affect: Mood normal.           ED Course & MDM                  No data recorded     [place] Coma Scale Score: 15 (07/19/25 0759 : Isabela Crawford III, RN)                            Medical Decision Making      Procedure  Procedures       [1]   Past Medical History:  Diagnosis Date    Other specified health status     No pertinent past medical history   [2]   Past Surgical History:  Procedure Laterality Date    OTHER SURGICAL HISTORY  2018    Prostate needle biopsy    OTHER SURGICAL HISTORY  2021    Neck surgery   [3]   Family History  Problem Relation Name Age of Onset    Cancer Mother      Cancer Sister      Hypertension Brother      Hyperlipidemia Brother      Neuropathy Brother      Neuropathy Daughter      Breast cancer Neg Hx     [4]   Social History  Tobacco Use    Smoking status: Former     Current packs/day: 0.00     Average packs/day: 1 pack/day for 20.0 years (20.0 ttl pk-yrs)     Types: Cigarettes     Start date:      Quit date:      Years since quittin.5    Smokeless tobacco: Never   Vaping Use    Vaping status: Never Used   Substance Use Topics    Alcohol use: Yes     Alcohol/week: 1.0 standard drink of alcohol     Types: 1 Cans of beer per week     Comment: maybe 1 beer every 1-2 weeks    Drug use: Never      maybe 1 beer every 1-2 weeks    Drug use: Never        Antoine Castañeda MD  07/22/25 0718

## 2025-07-20 ENCOUNTER — HOSPITAL ENCOUNTER (EMERGENCY)
Facility: HOSPITAL | Age: 80
Discharge: HOME | DRG: 698 | End: 2025-07-20
Attending: EMERGENCY MEDICINE
Payer: MEDICARE

## 2025-07-20 VITALS
OXYGEN SATURATION: 95 % | SYSTOLIC BLOOD PRESSURE: 112 MMHG | RESPIRATION RATE: 17 BRPM | HEART RATE: 98 BPM | TEMPERATURE: 97 F | WEIGHT: 170 LBS | DIASTOLIC BLOOD PRESSURE: 80 MMHG | BODY MASS INDEX: 25.18 KG/M2 | HEIGHT: 69 IN

## 2025-07-20 DIAGNOSIS — R31.0 GROSS HEMATURIA: Primary | ICD-10-CM

## 2025-07-20 LAB
ANION GAP SERPL CALC-SCNC: 12 MMOL/L (ref 10–20)
ANION GAP SERPL CALC-SCNC: 13 MMOL/L (ref 10–20)
BUN SERPL-MCNC: 27 MG/DL (ref 6–23)
BUN SERPL-MCNC: 30 MG/DL (ref 6–23)
CALCIUM SERPL-MCNC: 8.8 MG/DL (ref 8.6–10.3)
CALCIUM SERPL-MCNC: 9.4 MG/DL (ref 8.6–10.3)
CHLORIDE SERPL-SCNC: 104 MMOL/L (ref 98–107)
CHLORIDE SERPL-SCNC: 105 MMOL/L (ref 98–107)
CO2 SERPL-SCNC: 24 MMOL/L (ref 21–32)
CO2 SERPL-SCNC: 25 MMOL/L (ref 21–32)
CREAT SERPL-MCNC: 1.35 MG/DL (ref 0.5–1.3)
CREAT SERPL-MCNC: 1.35 MG/DL (ref 0.5–1.3)
EGFRCR SERPLBLD CKD-EPI 2021: 52 ML/MIN/1.73M*2
EGFRCR SERPLBLD CKD-EPI 2021: 52 ML/MIN/1.73M*2
ERYTHROCYTE [DISTWIDTH] IN BLOOD BY AUTOMATED COUNT: 13.3 % (ref 11.5–14.5)
GLUCOSE SERPL-MCNC: 114 MG/DL (ref 74–99)
GLUCOSE SERPL-MCNC: 133 MG/DL (ref 74–99)
HCT VFR BLD AUTO: 41.2 % (ref 41–52)
HGB BLD-MCNC: 13.9 G/DL (ref 13.5–17.5)
MCH RBC QN AUTO: 30.7 PG (ref 26–34)
MCHC RBC AUTO-ENTMCNC: 33.7 G/DL (ref 32–36)
MCV RBC AUTO: 91 FL (ref 80–100)
NRBC BLD-RTO: 0 /100 WBCS (ref 0–0)
PLATELET # BLD AUTO: 203 X10*3/UL (ref 150–450)
POTASSIUM SERPL-SCNC: 4.5 MMOL/L (ref 3.5–5.3)
POTASSIUM SERPL-SCNC: 4.8 MMOL/L (ref 3.5–5.3)
RBC # BLD AUTO: 4.53 X10*6/UL (ref 4.5–5.9)
SODIUM SERPL-SCNC: 136 MMOL/L (ref 136–145)
SODIUM SERPL-SCNC: 137 MMOL/L (ref 136–145)
WBC # BLD AUTO: 13.3 X10*3/UL (ref 4.4–11.3)

## 2025-07-20 PROCEDURE — 99283 EMERGENCY DEPT VISIT LOW MDM: CPT | Mod: 25 | Performed by: EMERGENCY MEDICINE

## 2025-07-20 PROCEDURE — 51700 IRRIGATION OF BLADDER: CPT

## 2025-07-20 PROCEDURE — 85027 COMPLETE CBC AUTOMATED: CPT | Performed by: EMERGENCY MEDICINE

## 2025-07-20 PROCEDURE — 51702 INSERT TEMP BLADDER CATH: CPT

## 2025-07-20 PROCEDURE — 2500000005 HC RX 250 GENERAL PHARMACY W/O HCPCS

## 2025-07-20 PROCEDURE — 36415 COLL VENOUS BLD VENIPUNCTURE: CPT | Performed by: EMERGENCY MEDICINE

## 2025-07-20 PROCEDURE — 80048 BASIC METABOLIC PNL TOTAL CA: CPT | Performed by: EMERGENCY MEDICINE

## 2025-07-20 RX ORDER — LIDOCAINE HYDROCHLORIDE 20 MG/ML
1 JELLY TOPICAL ONCE
Status: COMPLETED | OUTPATIENT
Start: 2025-07-20 | End: 2025-07-20

## 2025-07-20 RX ORDER — LIDOCAINE HYDROCHLORIDE 20 MG/ML
JELLY TOPICAL
Status: COMPLETED
Start: 2025-07-20 | End: 2025-07-20

## 2025-07-20 RX ADMIN — LIDOCAINE HYDROCHLORIDE 1 APPLICATION: 20 JELLY TOPICAL at 11:01

## 2025-07-20 ASSESSMENT — LIFESTYLE VARIABLES
TOTAL SCORE: 0
EVER HAD A DRINK FIRST THING IN THE MORNING TO STEADY YOUR NERVES TO GET RID OF A HANGOVER: NO
HAVE PEOPLE ANNOYED YOU BY CRITICIZING YOUR DRINKING: NO
HAVE YOU EVER FELT YOU SHOULD CUT DOWN ON YOUR DRINKING: NO
EVER FELT BAD OR GUILTY ABOUT YOUR DRINKING: NO

## 2025-07-20 ASSESSMENT — PAIN SCALES - GENERAL: PAINLEVEL_OUTOF10: 10 - WORST POSSIBLE PAIN

## 2025-07-20 ASSESSMENT — PAIN - FUNCTIONAL ASSESSMENT: PAIN_FUNCTIONAL_ASSESSMENT: 0-10

## 2025-07-20 ASSESSMENT — PAIN DESCRIPTION - LOCATION: LOCATION: PENIS

## 2025-07-20 NOTE — ED PROVIDER NOTES
"  Emergency Department Provider Note       History of Present Illness     History provided by: Patient  Limitations to History: None  External Records Reviewed with Brief Summary: previous urology and ED visits     HPI:  Benjamin Mae is a 80 y.o. male     Physical Exam   Triage vitals:  T 36.1 °C (97 °F)  HR (!) 115  /69  RR 19  O2 94 % None (Room air)    {ED Physical Exam:91175}      Medical Decision Making & ED Course   Medical Decision Makin y.o. male ***  ----  {Scoring Tools Utilized:71647}    Differential diagnoses considered include but are not limited to: ***    Social Determinants of Health which Significantly Impact Care: {Social Determinants of Health which Significantly Impact Care:44971} {The following actions were taken to address these social determinants (Optional):37337}    EKG Independent Interpretation: {EKG Independent Interpretation:00027}    Independent Result Review and Interpretation: {Independent Result Review and Interpretation:66530::\"Relevant laboratory and radiographic results were reviewed and independently interpreted by myself.  As necessary, they are commented on in the ED Course.\"}    Chronic conditions affecting the patient's care: {Chronic conditions affecting the patient's care:18680::\"As documented above in MDM\"}    The patient was discussed with the following consultants/services: {The patient was discussed with the following consultants/services:56067}    Care Considerations: {Care Considerations:91786::\"As documented above in MDM\"}    ED Course:       Disposition   {ED Disposition:18456}    Procedures   Procedures    {ED Provider Level (Optional):74039}    Jeremias Daniels MD MPH  Emergency Medicine                                                    " clear but was still pink.  Hemoglobin here is stable at 13.9.  Creatinine 1.35, baseline is 1.  I tried to reach out to the patient's urologist Dr. Enriquez since he has an appointment the following morning, but he is offline.  Currently we do not have any urologist on-call.  We discussed his admission to a hospital with urology, likely transfer to [facility] or [facility] to be seen by urology versus discharge home with close follow-up with Dr. Enriquez at 9 AM tomorrow.  Patient wishes to be discharged home.  He does not wish to be admitted or transferred.  States he will keep a close eye on his Agarwal catheter & return if he does not drain overnight.  transfer of care to oncoming provider pending completion of CBI and likely discharge per patient request.    ----  none    Differential diagnoses considered include but are not limited to: clogged agarwal catheter     Social Determinants of Health which Significantly Impact Care: Social Determinants of Health which Significantly Impact Care: None identified The following actions were taken to address these social determinants : none    EKG Independent Interpretation: EKG not obtained    Independent Result Review and Interpretation: None obtained    Chronic conditions affecting the patient's care: As documented above in MDM    The patient was discussed with the following consultants/services: Dr. Enriquez    Care Considerations: As documented above in [facility]    ED Course:  ED Course as of 08/02/25 1736   Sun Jul 20, 2025   1142 Creatinine here is 1.35, baseline creatinine is 1.  Small leukocytosis of 13, likely inflammatory.  Hemoglobin is 13.9, no significant blood loss noted [KF]      ED Course User Index  [KF] Jeremias Daniels MD MPH         Diagnoses as of 08/02/25 1736   Gross hematuria       Disposition   Patient was signed out to [facility] at 1600 pending completion of their work-up.  Please see the next provider's transition of care note for the remainder of the patient's care.      Procedures   Procedures    Patient was seen independently    Jeremias Daniels MD MPH  Emergency Medicine                                                       Jeremias Daniels MD MPH  08/02/25 8569

## 2025-07-20 NOTE — PROGRESS NOTES
Emergency Medicine Transition of Care Note.    I received Benjamin Mae in signout from Dr. Daniels.  Please see the previous ED provider note for all HPI, PE and MDM up to the time of signout at 1500. This is in addition to the primary record.    In brief Benjamin Mae is an 80 y.o. male presenting for   Chief Complaint   Patient presents with    blood in catheter     At the time of signout we were awaiting: CBI    ED Course as of 07/20/25 1700   Sun Jul 20, 2025   1142 Creatinine here is 1.35, baseline creatinine is 1.  Small leukocytosis of 13, likely inflammatory.  Hemoglobin is 13.9, no significant blood loss noted [KF]      ED Course User Index  [KF] Jeremias Daniels MD MPH         Diagnoses as of 07/20/25 1700   Gross hematuria       Medical Decision Making    80-year-old male presented to ED with hematuria.  Had received multiple CBI bags.  Still having gross hematuria on exam.  I did offer the patient transferred to a facility that has urology for further management of hematuria however patient declines.  Instead he would like to go home and follow-up with the urologist in the morning.  I did make him aware that it is a good chance that his catheter can be clogged due to out much blood there is, he verbalized understanding was to likely discharge.    Final diagnoses:   [R31.0] Gross hematuria           Procedure  Procedures    Jayy Bettencourt DO

## 2025-07-20 NOTE — ED NOTES
Pt presents to the ED for catheter issues. Pt was seen here yesterday for his catheter not draining. During his visit yesterday, his catheter was replaced. Pt has had a catheter for 5 years. Pt states his urine in his leg bag after dc yesterday was slightly tinted but woke up this morning with pain and dark red blood. Denies CP and SOB.      Johanna Juarez RN  07/20/25 5927

## 2025-07-20 NOTE — ED NOTES
Total of 6 CBI bags, 3, 3000ml bags and 3, 2000 ml bags. Pt urine is still red but more clear than upon arrival. Pt tolerated well. Initially this rn irrigated copious amounts of clots. No clots noted now.      Wilda Carvalho, BILL  07/20/25 9832

## 2025-07-21 ENCOUNTER — APPOINTMENT (OUTPATIENT)
Dept: UROLOGY | Facility: CLINIC | Age: 80
End: 2025-07-21
Payer: MEDICARE

## 2025-07-21 VITALS
WEIGHT: 170 LBS | BODY MASS INDEX: 24.34 KG/M2 | HEIGHT: 70 IN | SYSTOLIC BLOOD PRESSURE: 110 MMHG | DIASTOLIC BLOOD PRESSURE: 69 MMHG | HEART RATE: 102 BPM

## 2025-07-21 DIAGNOSIS — N40.1 BENIGN PROSTATIC HYPERPLASIA WITH URINARY OBSTRUCTION AND OTHER LOWER URINARY TRACT SYMPTOMS: Primary | ICD-10-CM

## 2025-07-21 DIAGNOSIS — N13.8 BENIGN PROSTATIC HYPERPLASIA WITH URINARY OBSTRUCTION AND OTHER LOWER URINARY TRACT SYMPTOMS: Primary | ICD-10-CM

## 2025-07-21 DIAGNOSIS — R33.9 URINE RETENTION: ICD-10-CM

## 2025-07-21 DIAGNOSIS — R31.0 GROSS HEMATURIA: ICD-10-CM

## 2025-07-21 PROCEDURE — 99214 OFFICE O/P EST MOD 30 MIN: CPT | Performed by: UROLOGY

## 2025-07-21 PROCEDURE — 1159F MED LIST DOCD IN RCRD: CPT | Performed by: UROLOGY

## 2025-07-21 RX ORDER — LIDOCAINE HYDROCHLORIDE 20 MG/ML
1 JELLY TOPICAL ONCE
Status: COMPLETED | OUTPATIENT
Start: 2025-07-21 | End: 2025-07-21

## 2025-07-21 RX ORDER — CIPROFLOXACIN 500 MG/1
500 TABLET, FILM COATED ORAL 2 TIMES DAILY
Qty: 14 TABLET | Refills: 0 | Status: ON HOLD | OUTPATIENT
Start: 2025-07-21 | End: 2025-07-28

## 2025-07-21 RX ADMIN — LIDOCAINE HYDROCHLORIDE 1 APPLICATION: 20 JELLY TOPICAL at 09:22

## 2025-07-21 NOTE — PROGRESS NOTES
07/21/2025  Patient developed gross hematuria, a three-way Guadarrama catheter was inserted in the ER yesterday    Exam: Three-way Guadarrama catheter in place, urine dark red    Manual irrigation was performed a large amount of old clots evacuated    PSA  11/25/2024 13.61  11/27/2023 11.2  5/23/2023 18.88    We discussed benign proximal hypertrophy, chronic Guadarrama catheter in the wearing  We discussed Guadarrama catheter trauma, gross hematuria clot retention  All the questions were answered, the patient expressed understanding and agreed to the plan.    Impression  Clot retention  BPH  Urinary retention  Chronic Guadarrama catheter  Elevated PSA, negative biopsy x 2     Plan  2-way Simplastic catheter  Increase liquid intake  Cipro 500 mg twice a day for 7 days  Appointment in 1 week for voiding trial  Call if problem    Chief Complaint   Patient presents with    Blood in Urine     Patient is here today for er follow up he went to ER on Friday and yesterday for hematuria and pain in his penis.         Physical Exam     TODAYS LAB RESULTS:  CBC  Order: [id number]   Status: Final result    Test Result Released: No (inaccessible in [facility])    0 Result Notes         Component  Ref Range & Units 1 d ago 3 yr ago 5 yr ago 6 yr ago   WBC  4.4 - 11.3 x10*3/uL 13.3 High  10.1 R 7.1 R 6.1 R   nRBC  0.0 - 0.0 /100 WBCs 0.0   0.0 R   RBC  4.50 - 5.90 x10*6/uL 4.53 4.29 Low  R 5.20 R 5.10 R   Hemoglobin  13.5 - 17.5 g/dL 13.9 13.0 Low  15.5 15.7   Hematocrit  41.0 - 52.0 % 41.2 37.2 Low  47.8 48.6   MCV  80 - 100 fL 91 87 92 95   MCH  26.0 - 34.0 pg 30.7      MCHC  32.0 - 36.0 g/dL 33.7 34.9 32.4 32.3   RDW  11.5 - 14.5 % 13.3 12.8 13.0 13.2   Platelets  150 - 450 x10*3/uL 203 270 R 234 R 254 R   Resulting Agency [facility]          Lab Results   Component Value Date    PSA 13.61 (H) 11/25/2024    PSA 11.20 (H) 11/27/2023    PSA 18.88 (H) 05/23/2023        ASSESSMENT&PLAN:      IMPRESSIONS:           10/14/2024  Patient here for Agarwal catheter change     Catheter was changed without difficulty by nurse     We discussed elevated PSA, prostate biopsy negative x 2 in the past  We discussed continue monitoring PSA versus no more PSA due to the age  All the questions were answered, the patient expressed understanding and agreed to the plan.     Impression  BPH  Urinary retention  Chronic Agarwal catheter  Elevated PSA, negative biopsy x 2     Plan  Continue Agarwal catheter monthly change  No more PSA          Chief Complaint   Patient presents with    4 week cath change        Pt here for his 4 week cath change             Patient here today for 4 wk indwelling agarwal catheter change. Removed and replaced with 16 [place] Agarwal 10cc balloon Patient prepped with iodine. Catheter draining clear yellow urine and able to be flushed appropriately. Patient tolerated well. Patient to return in 4 wks for next catheter change.   Liz Gallardo [facility]     Physical Exam      TODAYS LAB RESULTS:        ASSESSMENT&PLAN:        IMPRESSIONS:       11/20/2023 HBM  78-year-old male with a history of a neurogenic bladder with chronic indwelling Agarwal catheter which is changed every 4 weeks by our office personnel presents today for Agarwal catheter exchange.  The patient denies having any difficulties with the Agarwal catheter since he was last here he denies blood or burning on urination, his Agarwal catheter was successfully changed by the office personnel and he is to return to the office for Agarwal catheter exchange in 4 weeks.            PSA  11/25/2024 13.61  11/27/2023 11.2  5/23/2023 18.88  7/28/2022 12.85  4/19 2021 8.49  7/19/2020 7.43  5/11/2020 6.85     ......

## 2025-07-21 NOTE — PATIENT INSTRUCTIONS
Impression  Clot retention  BPH  Urinary retention  Chronic Guadarrama catheter  Elevated PSA, negative biopsy x 2     Plan  2-way Simplastic catheter  Increase liquid intake  Cipro 500 mg twice a day for 7 days  Appointment in 1 week for voiding trial  Call if problem

## 2025-07-23 ENCOUNTER — HOSPITAL ENCOUNTER (INPATIENT)
Facility: HOSPITAL | Age: 80
DRG: 698 | End: 2025-07-23
Attending: EMERGENCY MEDICINE | Admitting: INTERNAL MEDICINE
Payer: MEDICARE

## 2025-07-23 DIAGNOSIS — A41.9 SEPSIS, DUE TO UNSPECIFIED ORGANISM, UNSPECIFIED WHETHER ACUTE ORGAN DYSFUNCTION PRESENT (MULTI): ICD-10-CM

## 2025-07-23 DIAGNOSIS — N13.8 BENIGN PROSTATIC HYPERPLASIA WITH URINARY OBSTRUCTION AND OTHER LOWER URINARY TRACT SYMPTOMS: ICD-10-CM

## 2025-07-23 DIAGNOSIS — N39.0 URINARY TRACT INFECTION ASSOCIATED WITH INDWELLING URETHRAL CATHETER, SUBSEQUENT ENCOUNTER: ICD-10-CM

## 2025-07-23 DIAGNOSIS — N40.1 BENIGN PROSTATIC HYPERPLASIA WITH URINARY OBSTRUCTION AND OTHER LOWER URINARY TRACT SYMPTOMS: ICD-10-CM

## 2025-07-23 DIAGNOSIS — D64.9 ANEMIA, UNSPECIFIED TYPE: ICD-10-CM

## 2025-07-23 DIAGNOSIS — T83.511D URINARY TRACT INFECTION ASSOCIATED WITH INDWELLING URETHRAL CATHETER, SUBSEQUENT ENCOUNTER: ICD-10-CM

## 2025-07-23 DIAGNOSIS — R31.0 GROSS HEMATURIA: ICD-10-CM

## 2025-07-23 DIAGNOSIS — K59.00 CONSTIPATION, UNSPECIFIED CONSTIPATION TYPE: ICD-10-CM

## 2025-07-23 DIAGNOSIS — D62 ABLA (ACUTE BLOOD LOSS ANEMIA): ICD-10-CM

## 2025-07-23 DIAGNOSIS — R31.9 HEMATURIA, UNSPECIFIED TYPE: Primary | ICD-10-CM

## 2025-07-23 LAB
ABO GROUP (TYPE) IN BLOOD: NORMAL
ALBUMIN SERPL BCP-MCNC: 3.5 G/DL (ref 3.4–5)
ALP SERPL-CCNC: 83 U/L (ref 33–136)
ALT SERPL W P-5'-P-CCNC: 16 U/L (ref 10–52)
ANION GAP SERPL CALC-SCNC: 15 MMOL/L (ref 10–20)
ANTIBODY SCREEN: NORMAL
APPEARANCE UR: ABNORMAL
APTT PPP: 30 SECONDS (ref 26–36)
AST SERPL W P-5'-P-CCNC: 14 U/L (ref 9–39)
BASO STIPL BLD QL SMEAR: PRESENT
BASOPHILS # BLD AUTO: 0.08 X10*3/UL (ref 0–0.1)
BASOPHILS NFR BLD AUTO: 0.4 %
BILIRUB SERPL-MCNC: 0.6 MG/DL (ref 0–1.2)
BILIRUB UR STRIP.AUTO-MCNC: NEGATIVE MG/DL
BUN SERPL-MCNC: 53 MG/DL (ref 6–23)
BURR CELLS BLD QL SMEAR: NORMAL
CALCIUM SERPL-MCNC: 8.8 MG/DL (ref 8.6–10.3)
CHLORIDE SERPL-SCNC: 103 MMOL/L (ref 98–107)
CO2 SERPL-SCNC: 21 MMOL/L (ref 21–32)
COLOR UR: ABNORMAL
CREAT SERPL-MCNC: 1.46 MG/DL (ref 0.5–1.3)
EGFRCR SERPLBLD CKD-EPI 2021: 48 ML/MIN/1.73M*2
EOSINOPHIL # BLD AUTO: 0 X10*3/UL (ref 0–0.4)
EOSINOPHIL NFR BLD AUTO: 0 %
ERYTHROCYTE [DISTWIDTH] IN BLOOD BY AUTOMATED COUNT: 13.2 % (ref 11.5–14.5)
FERRITIN SERPL-MCNC: 174 NG/ML (ref 20–300)
GLUCOSE SERPL-MCNC: 142 MG/DL (ref 74–99)
GLUCOSE UR STRIP.AUTO-MCNC: NEGATIVE MG/DL
HCT VFR BLD AUTO: 23.7 % (ref 41–52)
HCT VFR BLD AUTO: 27.5 % (ref 41–52)
HGB BLD-MCNC: 8.2 G/DL (ref 13.5–17.5)
HGB BLD-MCNC: 9.6 G/DL (ref 13.5–17.5)
HYPOCHROMIA BLD QL SMEAR: NORMAL
IMM GRANULOCYTES # BLD AUTO: 0.81 X10*3/UL (ref 0–0.5)
IMM GRANULOCYTES NFR BLD AUTO: 3.9 % (ref 0–0.9)
INR PPP: 1.3 (ref 0.9–1.1)
IRON SATN MFR SERPL: 18 % (ref 25–45)
IRON SERPL-MCNC: 39 UG/DL (ref 35–150)
KETONES UR STRIP.AUTO-MCNC: NEGATIVE MG/DL
LACTATE SERPL-SCNC: 1.3 MMOL/L (ref 0.4–2)
LEUKOCYTE ESTERASE UR QL STRIP.AUTO: ABNORMAL
LYMPHOCYTES # BLD AUTO: 0.86 X10*3/UL (ref 0.8–3)
LYMPHOCYTES NFR BLD AUTO: 4.2 %
MCH RBC QN AUTO: 31.1 PG (ref 26–34)
MCHC RBC AUTO-ENTMCNC: 34.9 G/DL (ref 32–36)
MCV RBC AUTO: 89 FL (ref 80–100)
MONOCYTES # BLD AUTO: 2.41 X10*3/UL (ref 0.05–0.8)
MONOCYTES NFR BLD AUTO: 11.7 %
NEUTROPHILS # BLD AUTO: 16.47 X10*3/UL (ref 1.6–5.5)
NEUTROPHILS NFR BLD AUTO: 79.8 %
NITRITE UR QL STRIP.AUTO: NEGATIVE
NRBC BLD-RTO: 0 /100 WBCS (ref 0–0)
OVALOCYTES BLD QL SMEAR: NORMAL
PH UR STRIP.AUTO: 6.5 [PH]
PLATELET # BLD AUTO: 315 X10*3/UL (ref 150–450)
POLYCHROMASIA BLD QL SMEAR: NORMAL
POTASSIUM SERPL-SCNC: 3.9 MMOL/L (ref 3.5–5.3)
PROT SERPL-MCNC: 6.7 G/DL (ref 6.4–8.2)
PROT UR STRIP.AUTO-MCNC: ABNORMAL MG/DL
PROTHROMBIN TIME: 13.9 SECONDS (ref 9.8–12.4)
RBC # BLD AUTO: 3.09 X10*6/UL (ref 4.5–5.9)
RBC # UR STRIP.AUTO: ABNORMAL MG/DL
RBC #/AREA URNS AUTO: >20 /HPF
RBC MORPH BLD: NORMAL
RH FACTOR (ANTIGEN D): NORMAL
SODIUM SERPL-SCNC: 135 MMOL/L (ref 136–145)
SP GR UR STRIP.AUTO: >1.03
TIBC SERPL-MCNC: 221 UG/DL (ref 240–445)
UIBC SERPL-MCNC: 182 UG/DL (ref 110–370)
UROBILINOGEN UR STRIP.AUTO-MCNC: ABNORMAL MG/DL
WBC # BLD AUTO: 20.6 X10*3/UL (ref 4.4–11.3)
WBC #/AREA URNS AUTO: ABNORMAL /HPF

## 2025-07-23 PROCEDURE — 97165 OT EVAL LOW COMPLEX 30 MIN: CPT | Mod: GO

## 2025-07-23 PROCEDURE — 99233 SBSQ HOSP IP/OBS HIGH 50: CPT | Performed by: UROLOGY

## 2025-07-23 PROCEDURE — 2500000001 HC RX 250 WO HCPCS SELF ADMINISTERED DRUGS (ALT 637 FOR MEDICARE OP): Performed by: NURSE PRACTITIONER

## 2025-07-23 PROCEDURE — 80053 COMPREHEN METABOLIC PANEL: CPT | Performed by: PHYSICIAN ASSISTANT

## 2025-07-23 PROCEDURE — 99291 CRITICAL CARE FIRST HOUR: CPT | Performed by: PHYSICIAN ASSISTANT

## 2025-07-23 PROCEDURE — 81001 URINALYSIS AUTO W/SCOPE: CPT | Performed by: PHYSICIAN ASSISTANT

## 2025-07-23 PROCEDURE — 87086 URINE CULTURE/COLONY COUNT: CPT | Mod: PORLAB | Performed by: PHYSICIAN ASSISTANT

## 2025-07-23 PROCEDURE — 2500000005 HC RX 250 GENERAL PHARMACY W/O HCPCS: Performed by: PHYSICIAN ASSISTANT

## 2025-07-23 PROCEDURE — 85018 HEMOGLOBIN: CPT | Performed by: NURSE PRACTITIONER

## 2025-07-23 PROCEDURE — 51700 IRRIGATION OF BLADDER: CPT

## 2025-07-23 PROCEDURE — 36415 COLL VENOUS BLD VENIPUNCTURE: CPT | Performed by: PHYSICIAN ASSISTANT

## 2025-07-23 PROCEDURE — 1200000002 HC GENERAL ROOM WITH TELEMETRY DAILY

## 2025-07-23 PROCEDURE — 2500000004 HC RX 250 GENERAL PHARMACY W/ HCPCS (ALT 636 FOR OP/ED): Performed by: PHYSICIAN ASSISTANT

## 2025-07-23 PROCEDURE — 83540 ASSAY OF IRON: CPT | Performed by: NURSE PRACTITIONER

## 2025-07-23 PROCEDURE — 87040 BLOOD CULTURE FOR BACTERIA: CPT | Mod: PORLAB | Performed by: PHYSICIAN ASSISTANT

## 2025-07-23 PROCEDURE — 85025 COMPLETE CBC W/AUTO DIFF WBC: CPT | Performed by: PHYSICIAN ASSISTANT

## 2025-07-23 PROCEDURE — 82728 ASSAY OF FERRITIN: CPT | Performed by: NURSE PRACTITIONER

## 2025-07-23 PROCEDURE — 2500000002 HC RX 250 W HCPCS SELF ADMINISTERED DRUGS (ALT 637 FOR MEDICARE OP, ALT 636 FOR OP/ED): Performed by: NURSE PRACTITIONER

## 2025-07-23 PROCEDURE — 86901 BLOOD TYPING SEROLOGIC RH(D): CPT | Performed by: NURSE PRACTITIONER

## 2025-07-23 PROCEDURE — 85610 PROTHROMBIN TIME: CPT | Performed by: PHYSICIAN ASSISTANT

## 2025-07-23 PROCEDURE — 86923 COMPATIBILITY TEST ELECTRIC: CPT

## 2025-07-23 PROCEDURE — 85730 THROMBOPLASTIN TIME PARTIAL: CPT | Performed by: PHYSICIAN ASSISTANT

## 2025-07-23 PROCEDURE — 96365 THER/PROPH/DIAG IV INF INIT: CPT | Mod: 59

## 2025-07-23 PROCEDURE — 83605 ASSAY OF LACTIC ACID: CPT | Performed by: PHYSICIAN ASSISTANT

## 2025-07-23 PROCEDURE — 99223 1ST HOSP IP/OBS HIGH 75: CPT | Performed by: NURSE PRACTITIONER

## 2025-07-23 PROCEDURE — 96375 TX/PRO/DX INJ NEW DRUG ADDON: CPT | Mod: 59

## 2025-07-23 RX ORDER — MORPHINE SULFATE 2 MG/ML
2 INJECTION, SOLUTION INTRAMUSCULAR; INTRAVENOUS EVERY 4 HOURS PRN
Status: DISCONTINUED | OUTPATIENT
Start: 2025-07-23 | End: 2025-07-30 | Stop reason: HOSPADM

## 2025-07-23 RX ORDER — ONDANSETRON 4 MG/1
4 TABLET, ORALLY DISINTEGRATING ORAL EVERY 8 HOURS PRN
Status: DISCONTINUED | OUTPATIENT
Start: 2025-07-23 | End: 2025-07-30 | Stop reason: HOSPADM

## 2025-07-23 RX ORDER — GABAPENTIN 300 MG/1
300 CAPSULE ORAL 4 TIMES DAILY
Status: DISCONTINUED | OUTPATIENT
Start: 2025-07-23 | End: 2025-07-30 | Stop reason: HOSPADM

## 2025-07-23 RX ORDER — ONDANSETRON HYDROCHLORIDE 2 MG/ML
4 INJECTION, SOLUTION INTRAVENOUS EVERY 8 HOURS PRN
Status: DISCONTINUED | OUTPATIENT
Start: 2025-07-23 | End: 2025-07-30 | Stop reason: HOSPADM

## 2025-07-23 RX ORDER — CEFTRIAXONE 1 G/50ML
1 INJECTION, SOLUTION INTRAVENOUS ONCE
Status: COMPLETED | OUTPATIENT
Start: 2025-07-23 | End: 2025-07-23

## 2025-07-23 RX ORDER — TAMSULOSIN HYDROCHLORIDE 0.4 MG/1
0.4 CAPSULE ORAL DAILY
Status: DISCONTINUED | OUTPATIENT
Start: 2025-07-23 | End: 2025-07-30 | Stop reason: HOSPADM

## 2025-07-23 RX ORDER — SIMVASTATIN 20 MG/1
20 TABLET, FILM COATED ORAL NIGHTLY
Status: DISCONTINUED | OUTPATIENT
Start: 2025-07-23 | End: 2025-07-30 | Stop reason: HOSPADM

## 2025-07-23 RX ORDER — PANTOPRAZOLE SODIUM 40 MG/10ML
40 INJECTION, POWDER, LYOPHILIZED, FOR SOLUTION INTRAVENOUS
Status: DISCONTINUED | OUTPATIENT
Start: 2025-07-24 | End: 2025-07-30 | Stop reason: HOSPADM

## 2025-07-23 RX ORDER — GUAIFENESIN 600 MG/1
600 TABLET, EXTENDED RELEASE ORAL EVERY 12 HOURS PRN
Status: DISCONTINUED | OUTPATIENT
Start: 2025-07-23 | End: 2025-07-30 | Stop reason: HOSPADM

## 2025-07-23 RX ORDER — POLYETHYLENE GLYCOL 3350 17 G/17G
17 POWDER, FOR SOLUTION ORAL DAILY
Status: DISCONTINUED | OUTPATIENT
Start: 2025-07-23 | End: 2025-07-30 | Stop reason: HOSPADM

## 2025-07-23 RX ORDER — BISACODYL 5 MG
10 TABLET, DELAYED RELEASE (ENTERIC COATED) ORAL DAILY PRN
Status: DISCONTINUED | OUTPATIENT
Start: 2025-07-23 | End: 2025-07-30 | Stop reason: HOSPADM

## 2025-07-23 RX ORDER — PANTOPRAZOLE SODIUM 40 MG/1
40 TABLET, DELAYED RELEASE ORAL
Status: DISCONTINUED | OUTPATIENT
Start: 2025-07-24 | End: 2025-07-30 | Stop reason: HOSPADM

## 2025-07-23 RX ORDER — LIDOCAINE HYDROCHLORIDE 20 MG/ML
1 JELLY TOPICAL ONCE
Status: COMPLETED | OUTPATIENT
Start: 2025-07-23 | End: 2025-07-23

## 2025-07-23 RX ORDER — TALC
3 POWDER (GRAM) TOPICAL NIGHTLY PRN
Status: DISCONTINUED | OUTPATIENT
Start: 2025-07-23 | End: 2025-07-30 | Stop reason: HOSPADM

## 2025-07-23 RX ORDER — BISACODYL 10 MG/1
10 SUPPOSITORY RECTAL DAILY PRN
Status: DISCONTINUED | OUTPATIENT
Start: 2025-07-23 | End: 2025-07-30 | Stop reason: HOSPADM

## 2025-07-23 RX ORDER — CEFTRIAXONE 1 G/50ML
1 INJECTION, SOLUTION INTRAVENOUS ONCE
Status: DISCONTINUED | OUTPATIENT
Start: 2025-07-23 | End: 2025-07-23 | Stop reason: SDUPTHER

## 2025-07-23 RX ORDER — CEFTRIAXONE 1 G/50ML
1 INJECTION, SOLUTION INTRAVENOUS EVERY 24 HOURS
Status: DISCONTINUED | OUTPATIENT
Start: 2025-07-24 | End: 2025-07-28

## 2025-07-23 RX ORDER — HYDROCODONE BITARTRATE AND ACETAMINOPHEN 5; 325 MG/1; MG/1
1 TABLET ORAL EVERY 6 HOURS PRN
Refills: 0 | Status: DISCONTINUED | OUTPATIENT
Start: 2025-07-23 | End: 2025-07-30 | Stop reason: HOSPADM

## 2025-07-23 RX ORDER — FENTANYL CITRATE 50 UG/ML
50 INJECTION, SOLUTION INTRAMUSCULAR; INTRAVENOUS ONCE
Status: COMPLETED | OUTPATIENT
Start: 2025-07-23 | End: 2025-07-23

## 2025-07-23 RX ADMIN — FENTANYL CITRATE 50 MCG: 50 INJECTION INTRAMUSCULAR; INTRAVENOUS at 11:18

## 2025-07-23 RX ADMIN — GABAPENTIN 300 MG: 300 CAPSULE ORAL at 14:02

## 2025-07-23 RX ADMIN — CEFTRIAXONE 1 G: 1 INJECTION, SOLUTION INTRAVENOUS at 11:52

## 2025-07-23 RX ADMIN — SIMVASTATIN 20 MG: 20 TABLET, FILM COATED ORAL at 20:12

## 2025-07-23 RX ADMIN — TAMSULOSIN HYDROCHLORIDE 0.4 MG: 0.4 CAPSULE ORAL at 14:02

## 2025-07-23 RX ADMIN — GABAPENTIN 300 MG: 300 CAPSULE ORAL at 17:29

## 2025-07-23 RX ADMIN — LIDOCAINE HYDROCHLORIDE 1 APPLICATION: 20 JELLY TOPICAL at 11:18

## 2025-07-23 RX ADMIN — GABAPENTIN 300 MG: 300 CAPSULE ORAL at 20:12

## 2025-07-23 SDOH — ECONOMIC STABILITY: HOUSING INSECURITY: IN THE PAST 12 MONTHS, HOW MANY TIMES HAVE YOU MOVED WHERE YOU WERE LIVING?: 0

## 2025-07-23 SDOH — ECONOMIC STABILITY: HOUSING INSECURITY: AT ANY TIME IN THE PAST 12 MONTHS, WERE YOU HOMELESS OR LIVING IN A SHELTER (INCLUDING NOW)?: NO

## 2025-07-23 SDOH — SOCIAL STABILITY: SOCIAL INSECURITY: ARE YOU OR HAVE YOU BEEN THREATENED OR ABUSED PHYSICALLY, EMOTIONALLY, OR SEXUALLY BY ANYONE?: NO

## 2025-07-23 SDOH — ECONOMIC STABILITY: FOOD INSECURITY: WITHIN THE PAST 12 MONTHS, THE FOOD YOU BOUGHT JUST DIDN'T LAST AND YOU DIDN'T HAVE MONEY TO GET MORE.: NEVER TRUE

## 2025-07-23 SDOH — ECONOMIC STABILITY: FOOD INSECURITY: WITHIN THE PAST 12 MONTHS, YOU WORRIED THAT YOUR FOOD WOULD RUN OUT BEFORE YOU GOT THE MONEY TO BUY MORE.: NEVER TRUE

## 2025-07-23 SDOH — ECONOMIC STABILITY: TRANSPORTATION INSECURITY: IN THE PAST 12 MONTHS, HAS LACK OF TRANSPORTATION KEPT YOU FROM MEDICAL APPOINTMENTS OR FROM GETTING MEDICATIONS?: NO

## 2025-07-23 SDOH — SOCIAL STABILITY: SOCIAL INSECURITY: HAVE YOU HAD THOUGHTS OF HARMING ANYONE ELSE?: NO

## 2025-07-23 SDOH — SOCIAL STABILITY: SOCIAL INSECURITY
WITHIN THE LAST YEAR, HAVE YOU BEEN KICKED, HIT, SLAPPED, OR OTHERWISE PHYSICALLY HURT BY YOUR PARTNER OR EX-PARTNER?: NO

## 2025-07-23 SDOH — SOCIAL STABILITY: SOCIAL INSECURITY: DO YOU FEEL ANYONE HAS EXPLOITED OR TAKEN ADVANTAGE OF YOU FINANCIALLY OR OF YOUR PERSONAL PROPERTY?: NO

## 2025-07-23 SDOH — SOCIAL STABILITY: SOCIAL INSECURITY: HAS ANYONE EVER THREATENED TO HURT YOUR FAMILY OR YOUR PETS?: NO

## 2025-07-23 SDOH — SOCIAL STABILITY: SOCIAL INSECURITY: ABUSE: ADULT

## 2025-07-23 SDOH — SOCIAL STABILITY: SOCIAL INSECURITY: WITHIN THE LAST YEAR, HAVE YOU BEEN AFRAID OF YOUR PARTNER OR EX-PARTNER?: NO

## 2025-07-23 SDOH — SOCIAL STABILITY: SOCIAL INSECURITY: WITHIN THE LAST YEAR, HAVE YOU BEEN HUMILIATED OR EMOTIONALLY ABUSED IN OTHER WAYS BY YOUR PARTNER OR EX-PARTNER?: NO

## 2025-07-23 SDOH — ECONOMIC STABILITY: INCOME INSECURITY: IN THE PAST 12 MONTHS HAS THE ELECTRIC, GAS, OIL, OR WATER COMPANY THREATENED TO SHUT OFF SERVICES IN YOUR HOME?: NO

## 2025-07-23 SDOH — ECONOMIC STABILITY: FOOD INSECURITY: HOW HARD IS IT FOR YOU TO PAY FOR THE VERY BASICS LIKE FOOD, HOUSING, MEDICAL CARE, AND HEATING?: NOT HARD AT ALL

## 2025-07-23 SDOH — SOCIAL STABILITY: SOCIAL INSECURITY
WITHIN THE LAST YEAR, HAVE YOU BEEN RAPED OR FORCED TO HAVE ANY KIND OF SEXUAL ACTIVITY BY YOUR PARTNER OR EX-PARTNER?: NO

## 2025-07-23 SDOH — ECONOMIC STABILITY: HOUSING INSECURITY: IN THE LAST 12 MONTHS, WAS THERE A TIME WHEN YOU WERE NOT ABLE TO PAY THE MORTGAGE OR RENT ON TIME?: NO

## 2025-07-23 SDOH — SOCIAL STABILITY: SOCIAL INSECURITY: WERE YOU ABLE TO COMPLETE ALL THE BEHAVIORAL HEALTH SCREENINGS?: YES

## 2025-07-23 SDOH — SOCIAL STABILITY: SOCIAL INSECURITY: DOES ANYONE TRY TO KEEP YOU FROM HAVING/CONTACTING OTHER FRIENDS OR DOING THINGS OUTSIDE YOUR HOME?: NO

## 2025-07-23 SDOH — SOCIAL STABILITY: SOCIAL INSECURITY: DO YOU FEEL UNSAFE GOING BACK TO THE PLACE WHERE YOU ARE LIVING?: NO

## 2025-07-23 SDOH — SOCIAL STABILITY: SOCIAL INSECURITY: HAVE YOU HAD ANY THOUGHTS OF HARMING ANYONE ELSE?: NO

## 2025-07-23 SDOH — SOCIAL STABILITY: SOCIAL INSECURITY: ARE THERE ANY APPARENT SIGNS OF INJURIES/BEHAVIORS THAT COULD BE RELATED TO ABUSE/NEGLECT?: NO

## 2025-07-23 ASSESSMENT — COGNITIVE AND FUNCTIONAL STATUS - GENERAL
CLIMB 3 TO 5 STEPS WITH RAILING: A LITTLE
DAILY ACTIVITIY SCORE: 15
WALKING IN HOSPITAL ROOM: A LITTLE
DRESSING REGULAR LOWER BODY CLOTHING: A LOT
MOVING TO AND FROM BED TO CHAIR: A LITTLE
MOVING TO AND FROM BED TO CHAIR: A LITTLE
DAILY ACTIVITIY SCORE: 23
DRESSING REGULAR LOWER BODY CLOTHING: A LITTLE
MOBILITY SCORE: 20
PERSONAL GROOMING: A LITTLE
HELP NEEDED FOR BATHING: A LOT
CLIMB 3 TO 5 STEPS WITH RAILING: A LITTLE
STANDING UP FROM CHAIR USING ARMS: A LITTLE
DAILY ACTIVITIY SCORE: 23
WALKING IN HOSPITAL ROOM: A LITTLE
DRESSING REGULAR LOWER BODY CLOTHING: A LITTLE
TOILETING: TOTAL
DRESSING REGULAR UPPER BODY CLOTHING: A LITTLE
PATIENT BASELINE BEDBOUND: NO
STANDING UP FROM CHAIR USING ARMS: A LITTLE
MOBILITY SCORE: 20

## 2025-07-23 ASSESSMENT — LIFESTYLE VARIABLES
AUDIT-C TOTAL SCORE: 2
AUDIT-C TOTAL SCORE: 2
HOW OFTEN DO YOU HAVE 6 OR MORE DRINKS ON ONE OCCASION: NEVER
HAVE YOU EVER FELT YOU SHOULD CUT DOWN ON YOUR DRINKING: NO
TOTAL SCORE: 0
HOW MANY STANDARD DRINKS CONTAINING ALCOHOL DO YOU HAVE ON A TYPICAL DAY: 1 OR 2
EVER FELT BAD OR GUILTY ABOUT YOUR DRINKING: NO
EVER HAD A DRINK FIRST THING IN THE MORNING TO STEADY YOUR NERVES TO GET RID OF A HANGOVER: NO
SKIP TO QUESTIONS 9-10: 1
HAVE PEOPLE ANNOYED YOU BY CRITICIZING YOUR DRINKING: NO
HOW OFTEN DO YOU HAVE A DRINK CONTAINING ALCOHOL: 2-4 TIMES A MONTH

## 2025-07-23 ASSESSMENT — ACTIVITIES OF DAILY LIVING (ADL)
BATHING: INDEPENDENT
JUDGMENT_ADEQUATE_SAFELY_COMPLETE_DAILY_ACTIVITIES: YES
BATHING_ASSISTANCE: MODERATE
ASSISTIVE_DEVICE: EYEGLASSES
LACK_OF_TRANSPORTATION: NO
ADL_ASSISTANCE: INDEPENDENT
LACK_OF_TRANSPORTATION: NO
GROOMING: INDEPENDENT
HEARING - RIGHT EAR: FUNCTIONAL
TOILETING: INDEPENDENT
WALKS IN HOME: INDEPENDENT
ADEQUATE_TO_COMPLETE_ADL: YES
LACK_OF_TRANSPORTATION: NO
PATIENT'S MEMORY ADEQUATE TO SAFELY COMPLETE DAILY ACTIVITIES?: YES
DRESSING YOURSELF: INDEPENDENT
FEEDING YOURSELF: INDEPENDENT
HEARING - LEFT EAR: FUNCTIONAL

## 2025-07-23 ASSESSMENT — PAIN DESCRIPTION - LOCATION: LOCATION: PENIS

## 2025-07-23 ASSESSMENT — PAIN DESCRIPTION - PAIN TYPE: TYPE: ACUTE PAIN

## 2025-07-23 ASSESSMENT — PAIN DESCRIPTION - FREQUENCY: FREQUENCY: CONSTANT/CONTINUOUS

## 2025-07-23 ASSESSMENT — PAIN SCALES - GENERAL
PAINLEVEL_OUTOF10: 0 - NO PAIN
PAINLEVEL_OUTOF10: 0 - NO PAIN
PAINLEVEL_OUTOF10: 5 - MODERATE PAIN
PAINLEVEL_OUTOF10: 0 - NO PAIN

## 2025-07-23 ASSESSMENT — PATIENT HEALTH QUESTIONNAIRE - PHQ9
2. FEELING DOWN, DEPRESSED OR HOPELESS: NOT AT ALL
1. LITTLE INTEREST OR PLEASURE IN DOING THINGS: NOT AT ALL
SUM OF ALL RESPONSES TO PHQ9 QUESTIONS 1 & 2: 0

## 2025-07-23 ASSESSMENT — PAIN - FUNCTIONAL ASSESSMENT: PAIN_FUNCTIONAL_ASSESSMENT: 0-10

## 2025-07-23 ASSESSMENT — PAIN DESCRIPTION - DESCRIPTORS: DESCRIPTORS: PRESSURE

## 2025-07-23 NOTE — ED TRIAGE NOTES
Pt arrives to the ED via ambulance due to clotting and bleeding from agarwal catheter. Pt was here on Saturday and a new agarwal was placed he was discharged but since then has noticed more blood in his urine with increased pain. He has also noticed to be feeling more weak over the last 2 days Pt received 100 mcg of fentanyl total from EMS PTA.

## 2025-07-23 NOTE — ED PROVIDER NOTES
EMERGENCY MEDICINE EVALUATION NOTE    History of Present Illness     Chief Complaint:   Chief Complaint   Patient presents with    Blood in Urine       HPI: Benjamin Mae is a 80 y.o. male presents with a chief complaint of blood in urine.  Patient was been having issues now for about a week.  Patient states that he had his Guadarrama exchanged multiple times.  He reports that he was here over the weekend where they put a large amount of fluids to the Guadarrama to try to irrigate them out which his symptoms were relieved however they did not have urology on-call and he elected to go home and follow-up with Dr. Enriquez the next day.  At that time the Guadarrama was exchanged and he would put a larger Guadarrama in but not a three-way.  While in the office they irrigated his bladder several times and were able to get it mostly clear.  He states that he came in today with worse symptoms because he still feels like every once while the catheter gets clogged and he gets a large amount of pain from it.  He states that intermittently it does not drain as well.  Patient denies use of any anticoagulation.  He states that currently he has pain in his penis and lower abdomen.    Previous History   Medical History[1]  Surgical History[2]  Social History[3]  Family History[4]  Allergies[5]  Current Outpatient Medications   Medication Instructions    ciprofloxacin (CIPRO) 500 mg, oral, 2 times daily    fluticasone (Flonase) 50 mcg/actuation nasal spray 1 spray, Each Nostril, Daily, Shake gently. Before first use, prime pump. After use, clean tip and replace cap.    gabapentin (NEURONTIN) 300 mg, 4 times daily    ibuprofen 200 mg tablet Every 6 hours PRN    SAW PALMETTO ORAL Daily    simvastatin (ZOCOR) 20 mg, oral, Daily    tamsulosin (FLOMAX) 0.4 mg, oral, Daily    vit C/E/cuperic/zinc/lutein (PRESERVISION LUTEIN ORAL) Daily       Physical Exam     Appearance: Alert, oriented , cooperative.  Guadarrama catheter in place with bloody urine bag.  Large  amount of clots throughout Guadarrama draining tube and bag.     Skin: Intact,  dry skin, no lesions, rash, petechiae or purpura.      Eyes: PERRLA, EOMs intact,  Conjunctiva pink with no redness or exudates.      ENT: Hearing grossly intact. Pharynx clear, uvula midline.      Neck: Supple. Trachea at midline.      Pulmonary: Clear bilaterally. No rales, rhonchi or wheezing. No accessory muscle use or stridor.     Cardiac: Normal rate and rhythm without murmur     Abdomen: Soft, nontender, active bowel sounds.     Musculoskeletal: Full range of motion.      Neurological:Cranial nerves II through XII are grossly intact, normal sensation, no weakness, no focal findings identified.     Results     Labs Reviewed   PROTIME-INR - Abnormal       Result Value    Protime 13.9 (*)     INR 1.3 (*)    APTT - Normal    aPTT 30      Narrative:     The APTT is no longer used for monitoring Unfractionated Heparin Therapy. For monitoring Heparin Therapy, use the Heparin Assay.   CBC WITH AUTO DIFFERENTIAL   COMPREHENSIVE METABOLIC PANEL   URINALYSIS WITH REFLEX CULTURE AND MICROSCOPIC    Narrative:     The following orders were created for panel order Urinalysis with Reflex Culture and Microscopic.  Procedure                               Abnormality         Status                     ---------                               -----------         ------                     Urinalysis with Reflex C...[[id number]]                                                 Extra Urine Gray Tube[[id number]]                                                         Please view results for these tests on the individual orders.   URINALYSIS WITH REFLEX CULTURE AND MICROSCOPIC   EXTRA URINE GRAY TUBE     No orders to display         ED Course & Medical Decision Making     Medications   lidocaine 2 % mucosal jelly (Uro-Jet) 1 Application (1 Application urethral Given 7/23/25 1118)   fentaNYL PF (Sublimaze) injection 50 mcg (50 mcg intravenous Given 7/23/25 1118)  "    Heart Rate:  [116]   Temperature:  [36.3 °C (97.3 °F)]   Respirations:  [24]   BP: (117)/(78)   Height:  [175.3 cm (5' 9\")]   Weight:  [77.1 kg (170 lb)]   Pulse Ox:  [98 %]    ED Course as of 07/23/25 1227   Wed Jul 23, 2025   1101 Spoke to the patient's urologist Dr. Enriquez who agreed to consult on admission. [CJ]   1141 Due to patient's elevated white blood cell count he will receive Rocephin to cover for potential urinary tract infection from recent instrumentation multiple times.  Patient's hemoglobin has also dropped to 9.6.  This is not enough to currently require blood transfusion but will need to be monitored.  I did speak to the hospitalist SOL who agreed to admit the patient at this time.  Patient admitted to medical floor under Dr. Urias. [CJ]   1224 Due to patient's elevated blood cell count with with the UTI present on urinalysis patient will be marked as a sepsis alert at this time.  Patient will have blood cultures added.  Patient already did receive 1 g of Rocephin.  If needed patient will receive an additional gram of Rocephin. [CJ]      ED Course User Index  [CJ] Ricco Huynh PA-C         Diagnoses as of 07/23/25 1227   Hematuria, unspecified type   Anemia, unspecified type   Sepsis, due to unspecified organism, unspecified whether acute organ dysfunction present (Multi)       Procedures   Critical Care    Performed by: Ricco Huynh PA-C  Authorized by: Neville Bansal DO    Critical care provider statement:     Critical care time (minutes):  35    Critical care time was exclusive of:  Separately billable procedures and treating other patients    Critical care was necessary to treat or prevent imminent or life-threatening deterioration of the following conditions:  Sepsis    Critical care was time spent personally by me on the following activities:  Blood draw for specimens, discussions with consultants, examination of patient, evaluation of patient's response to treatment, ordering and " performing treatments and interventions, ordering and review of laboratory studies, re-evaluation of patient's condition, pulse oximetry and review of old charts    Care discussed with: admitting provider        Diagnosis     1. Hematuria, unspecified type        Disposition   Admit    ED Prescriptions    None         Disclaimer: This note was dictated by speech recognition. Minor errors in transcription may be present. Please call if questions.         Ricco Huynh PA-C  25 1143       [1]   Past Medical History:  Diagnosis Date    Other specified health status     No pertinent past medical history   [2]   Past Surgical History:  Procedure Laterality Date    OTHER SURGICAL HISTORY  2018    Prostate needle biopsy    OTHER SURGICAL HISTORY  2021    Neck surgery   [3]   Social History  Tobacco Use    Smoking status: Former     Current packs/day: 0.00     Average packs/day: 1 pack/day for 20.0 years (20.0 ttl pk-yrs)     Types: Cigarettes     Start date:      Quit date:      Years since quittin.5    Smokeless tobacco: Never   Vaping Use    Vaping status: Never Used   Substance Use Topics    Alcohol use: Yes     Alcohol/week: 1.0 standard drink of alcohol     Types: 1 Cans of beer per week     Comment: maybe 1 beer every 1-2 weeks    Drug use: Never   [4]   Family History  Problem Relation Name Age of Onset    Cancer Mother      Cancer Sister      Hypertension Brother      Hyperlipidemia Brother      Neuropathy Brother      Neuropathy Daughter      Breast cancer Neg Hx     [5] No Known Allergies       Ricco Huynh PA-C  25 [phone]

## 2025-07-23 NOTE — PROGRESS NOTES
Occupational Therapy  Evaluation    Patient Name: Benjamin Mae  MRN: [id number]  Today's Date: 7/23/2025  Time Calculation  Start Time: 1438  Stop Time: 1459  Time Calculation (min): 21 min    Current Problem:   1. Hematuria, unspecified type    2. Anemia, unspecified type    3. 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      Precautions:   Medical Precautions: Fall precautions  Precautions Comment:  (catheter and continuous bladder irrigation)    ASSESSMENT  OT Assessment:  (OT eval completed. Pt demonstrated decreased ADLs, funcitonal transfers, funcitonal moblity, and balance. Continued skilled OT services can benefit pt to address these needs.)    Barriers to discharge home: Caregiver assistance  Patient lives alone and/or does not have reliable caregiver assistance (Pt has son and daughter in law that do live near by)    PLAN  Frequency: 3 times per week  Treatment Interventions: ADL retraining, Functional transfer training, UE strengthening/ROM, Endurance training, Compensatory technique education  Discharge Recommendations: Low intensity level of continued care  OT OK to discharge: Yes    GENERAL VISIT INFORMATION   Start of session communication: Bedside nurse  End of session communication: Bedside nurse  Family/caregiver present: Yes (Son and daughter in law arrived near the end of session)    Position Pt Received:  Bed, 3 rail up, Alarm on  End of session position: Up in chair, Alarm on    SUBJECTIVE  Home Living:  Type of Home: House  Lives With: Alone  Home Adaptive Equipment: Walker rolling or standard  Home Layout: One level  Home Access: Stairs to enter with  rails  Entrance Stairs-Number of Steps: 3  Bathroom Shower/Tub: Walk-in shower  Bathroom Equipment: Grab bars in shower (has a shower chair but not using)     Prior Level of Function:  Level of [place]: Independent with ADLs and functional transfers, Independent with homemaking with ambulation  Receives Help From: Family (Son and daughter in law lives 5 mins away)  ADL Assistance: Independent  Homemaking Assistance: Independent  Ambulatory Assistance: Independent (Just stated using a walker at home during this acute incident but not prior)  Leisure:  (Has a boat and cabin at [employer], enjoys fishing)  Hand Dominance: Right  Prior Function Comments: Independent with all yardwork and outside chores, drives    Pain:    Score: 0 - No pain (at rest)      OBJECTIVE    Cognition:  Overall Cognitive Status: Within Functional Limits  Processing Speed: Within funtional limits             Current ADL function:   EATING:  Stand by     GROOMING: Minimal     BATHING: Moderate     UB DRESSING: Minimal     LB DRESSING: Moderate     TOILETING: Total        Activity Tolerance:  Endurance: Tolerates 10 - 20 min exercise with multiple rests  Activity Tolerance Comments:  (Pt stated become light headed upon sitting and/or standing but it resolves quickly,. This is typical for him)    Bed Mobility/Transfers:   Bed Mobility  Bed Mobility:  (Supine > sit CGA using bed rail)  Transfers  Transfer:  (Sit <> stand CGA with cues for hand placement and walker safety)    Ambulation/Gait Training:  Functional Mobility  Functional Mobility Performed:  (Functional mobility with FWW bed to chair with Gary x 1 to manage walker)    Sitting Balance:  Dynamic Sitting Balance  Dynamic Sitting-Level of Assistance: Close supervision    Standing Balance:  Static Standing Balance  Static Standing-Level of Assistance: Contact guard  Dynamic Standing Balance  Dynamic Standing-Level of Assistance: Minimum assistance (with FWW)    Vision: Vision - Basic  Assessment  Current Vision: Wears glasses all the time      Sensation:  Sensation Comment:  (Pt states neuropathy B feet and L hand occasional numbness)    Strength:  Strength Comments:  (Functional UE assessment BUE strength WFL)    Coordination:  Movements are Fluid and Coordinated: Yes     Hand Function:  Hand Function  Gross Grasp: Functional    Extremities: RUE   RUE : Within Functional Limits and LUE   LUE: Within Functional Limits    Outcome Measures: [facility] Daily Activity   Putting on and taking off regular lower body clothing: A lot  Bathing (including washing, rinsing, drying): A lot  Putting on and taking off regular upper body clothing: A little  Toileting, which includes using toilet, bedpan or urinal: Total  Taking care of personal grooming such as brushing teeth: A little   Eating Meals: None   Daily Activity - Total Score: 15    EDUCATION:     Education Documentation  Body Mechanics, taught by KOLBY Mcmahon at 7/23/2025  3:50 PM.  Learner: Patient  Readiness: Acceptance  Method: Explanation  Response: Needs Reinforcement, Verbalizes Understanding    Education Comments  No comments found.        Goals:   Encounter Problems       Encounter Problems (Active)       ADLs       Patient will complete LB dressing with SBA donning and doffing all LE clothes  with PRN adaptive equipment while supported sitting (Progressing)       Start:  07/23/25    Expected End:  08/06/25            Patient will complete toileting including hygiene & clothing management with SBA at bathroom level. (Progressing)       Start:  07/23/25    Expected End:  08/06/25               BALANCE       Patient will tolerate standing for 10 minutes with SBA with least restrictive device in order to improve functional activity tolerance for ADL tasks. (Progressing)       Start:  07/23/25    Expected End:  08/06/25               MOBILITY       Patient will perform Functional mobility max Household distances with SBA and least restrictive  device in order to improve safety and functional mobility. (Progressing)       Start:  07/23/25    Expected End:  08/06/25               TRANSFERS       Patient will complete functional transfers with least restrictive device with MOD I to decreased risk of falls. (Progressing)       Start:  07/23/25    Expected End:  08/06/25               Completion of this session, clinical decision making, and documentation performed under the supervision/direction of LEIDY Moreira/DERRICK CARROT

## 2025-07-23 NOTE — PROGRESS NOTES
Benjamin Mae is a 80 y.o. male admitted for Hematuria, unspecified type. Pharmacy reviewed the patient's [id number] medications and allergies for accuracy.    The list below reflects the PTA list prior to pharmacy medication history. A summary a changes to the PTA medication list has been listed below. Please review each medication in order reconciliation for additional clarification and justification.    Source of information: surescrilane, t2p - no changes!    Medications added:    Medications modified:    Medications to be removed:    Medications of concern:      Prior to Admission Medications   Prescriptions Last Dose Informant Patient Reported? Taking?   SAW PALMETTO ORAL   Yes No   Sig: Take by mouth once daily.   ciprofloxacin (Cipro) 500 mg tablet   No No   Sig: Take 1 tablet (500 mg) by mouth 2 times a day for 7 days.   fluticasone (Flonase) 50 mcg/actuation nasal spray   No No   Sig: Administer 1 spray into each nostril once daily. Shake gently. Before first use, prime pump. After use, clean tip and replace cap.   gabapentin (Neurontin) 300 mg capsule   Yes No   Sig: Take 1 capsule (300 mg) by mouth 4 times a day.   ibuprofen 200 mg tablet   Yes No   Sig: Take by mouth every 6 hours if needed for mild pain (1 - 3).   simvastatin (Zocor) 20 mg tablet   No No   Sig: Take 1 tablet (20 mg) by mouth once daily.   tamsulosin (Flomax) 0.4 mg 24 hr capsule   No No   Sig: TAKE 1 CAPSULE BY MOUTH DAILY   vit C/E/cuperic/zinc/lutein (PRESERVISION LUTEIN ORAL)   Yes No   Sig: Take by mouth once daily.      Facility-Administered Medications: None       NORMAN SANTIAGO

## 2025-07-23 NOTE — CONSULTS
"Reason For Consult  Gross hematuria, clot retention    History Of Present Illness  Benjamin Mae is a 80 y.o. male presenting with a gross hematuria and clot retention.  Known to  service for BPH.  A three-way Guadarrama catheter was inserted in the ER     Past Medical History  He has a past medical history of Other specified health status.    Surgical History  He has a past surgical history that includes Other surgical history (11/13/2018) and Other surgical history (07/22/2021).     Social History  He reports that he quit smoking about 42 years ago. His smoking use included cigarettes. He started smoking about 62 years ago. He has a 20 pack-year smoking history. He has never used smokeless tobacco. He reports current alcohol use of about 1.0 standard drink of alcohol per week. He reports that he does not use drugs.    Family History  Family History[1]     Allergies  Patient has no known allergies.    Review of Systems       Physical Exam       Last Recorded Vitals  Blood pressure 134/76, pulse 92, temperature 36.3 °C (97.4 °F), temperature source Temporal, resp. rate 18, height 1.753 m (5' 9\"), weight 77.1 kg (170 lb), SpO2 98%.    Relevant Results       Assessment/Plan     Impression  Gross hematuria, clot retention  BPH    Plan  Three-way Simplastic catheter  Continue bladder irrigation  Manual irrigation as needed   elective cystoscopy as outpatient scheduled      I spent 25 minutes in the professional and overall care of this patient.      Kumar Enriquez MD         [1]   Family History  Problem Relation Name Age of Onset    Cancer Mother      Cancer Sister      Hypertension Brother      Hyperlipidemia Brother      Neuropathy Brother      Neuropathy Daughter      Breast cancer Neg Hx       "

## 2025-07-23 NOTE — H&P
[facility] - GENERAL MEDICINE HISTORY AND PHYSICAL    HISTORY OF PRESENT ILLNESS     History Obtained From (Primary Source): The patient  Collateral History (Secondary Sources): D/w ED    History Of Present Illness (HPI):  Benjamin Mae is a 80 y.o. 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  Patient had been seen in the emergency department over the weekend with complaint of hematuria.  A three-way catheter has been inserted patient was irrigated patient wanted to go home and follow-up with his urologist on Monday.  The patient had been discharged to home and returned again over the weekend to the emergency department with similar issues the Agarwal catheter was not draining and the patient could not get it to improve.  Had additional irrigation performed and followed up with urology on Monday.  His catheter was evaluated by urology and irrigated clots were removed patient was placed on Cipro.  This morning the patient returns to the hospital again with abdominal pain and his Agarwal catheter not draining.  Patient was seen Agarwal catheter irrigated set up with CBI patient now having ongoing hematuria.  He does complain of some chills as well as weakness unable to get up off the couch.  No severe abdominal pain after having received some pain medicine prior to arrival.  Case was discussed with the ED provider plan is to admit patient continue treatment with IV antibiotics obtain urology consultation patient is also continued on CBI.    ED Course:   Vitals on presentation: T 36.3 °C (97.3 °F)  HR (!) 116  /78  RR (!) 24  O2 98 % None (Room air)  Labs:   CBC with WBC 20.6, Hgb 9.6, Plts 315.   CMP with glucose 142, Na 135, K 3.9, BUN 53, sCr 1.46, alk phos 83, ALT 16, AST 14, bilirubin 0.6. Magnesium No results found for requested labs within last 365 days..   BNP No results found for  requested labs within last 365 days.. Trop No results found for requested labs within last 365 days..   Lactate No results found for requested labs within last 365 days.  EKG: Not performed  Imaging - agree with radiology interpretation(s):   Interventions: CBI, Rocephin    12-point ROS reviewed and found to be negative aside from aforementioned positives in HPI and/or noted in dedicated ROS section below.     Decision made to admit the patient to the hospitalist service after evaluation of the patient, review of the above, and discussion with ED provider.     LABS AND IMAGING     I have personally reviewed the following labs from 07/23/25: CBC, CMP, and UA  I have personally reviewed any obtained EKGs on 07/23/25, with my interpretation as listed above in the ED summary course.   I have personally reviewed the patient's vitals on presentation to the ED and any/all changes through to time of admission (on 07/23/25).     ED Course (From ED Provider):  ED Course as of 07/23/25 1232   Wed Jul 23, 2025   1101 Spoke to the patient's urologist Dr. Enriquez who agreed to consult on admission. [CJ]   1141 Due to patient's elevated white blood cell count he will receive Rocephin to cover for potential urinary tract infection from recent instrumentation multiple times.  Patient's hemoglobin has also dropped to 9.6.  This is not enough to currently require blood transfusion but will need to be monitored.  I did speak to the hospitalist SOL who agreed to admit the patient at this time.  Patient admitted to medical floor under Dr. Urias. [CJ]   1224 Due to patient's elevated blood cell count with with the UTI present on urinalysis patient will be marked as a sepsis alert at this time.  Patient will have blood cultures added.  Patient already did receive 1 g of Rocephin.  If needed patient will receive an additional gram of Rocephin. [CJ]      ED Course User Index  [CJ] Ricco Huynh PA-C         Diagnoses as of 07/23/25 1232    Hematuria, unspecified type   Anemia, unspecified type   Sepsis, due to unspecified organism, unspecified whether acute organ dysfunction present (Multi)     Relevant Results  Results for orders placed or performed during the hospital encounter of 07/23/25 (from the past 24 hours)   CBC and Auto Differential   Result Value Ref Range    WBC 20.6 (H) 4.4 - 11.3 x10*3/uL    nRBC 0.0 0.0 - 0.0 /100 WBCs    RBC 3.09 (L) 4.50 - 5.90 x10*6/uL    Hemoglobin 9.6 (L) 13.5 - 17.5 g/dL    Hematocrit 27.5 (L) 41.0 - 52.0 %    MCV 89 80 - 100 fL    MCH 31.1 26.0 - 34.0 pg    MCHC 34.9 32.0 - 36.0 g/dL    RDW 13.2 11.5 - 14.5 %    Platelets 315 150 - 450 x10*3/uL    Neutrophils % 79.8 40.0 - 80.0 %    Immature Granulocytes %, Automated 3.9 (H) 0.0 - 0.9 %    Lymphocytes % 4.2 13.0 - 44.0 %    Monocytes % 11.7 2.0 - 10.0 %    Eosinophils % 0.0 0.0 - 6.0 %    Basophils % 0.4 0.0 - 2.0 %    Neutrophils Absolute 16.47 (H) 1.60 - 5.50 x10*3/uL    Immature Granulocytes Absolute, Automated 0.81 (H) 0.00 - 0.50 x10*3/uL    Lymphocytes Absolute 0.86 0.80 - 3.00 x10*3/uL    Monocytes Absolute 2.41 (H) 0.05 - 0.80 x10*3/uL    Eosinophils Absolute 0.00 0.00 - 0.40 x10*3/uL    Basophils Absolute 0.08 0.00 - 0.10 x10*3/uL   Comprehensive metabolic panel   Result Value Ref Range    Glucose 142 (H) 74 - 99 mg/dL    Sodium 135 (L) 136 - 145 mmol/L    Potassium 3.9 3.5 - 5.3 mmol/L    Chloride 103 98 - 107 mmol/L    Bicarbonate 21 21 - 32 mmol/L    Anion Gap 15 10 - 20 mmol/L    Urea Nitrogen 53 (H) 6 - 23 mg/dL    Creatinine 1.46 (H) 0.50 - 1.30 mg/dL    eGFR 48 (L) >60 mL/min/1.73m*2    Calcium 8.8 8.6 - 10.3 mg/dL    Albumin 3.5 3.4 - 5.0 g/dL    Alkaline Phosphatase 83 33 - 136 U/L    Total Protein 6.7 6.4 - 8.2 g/dL    AST 14 9 - 39 U/L    Bilirubin, Total 0.6 0.0 - 1.2 mg/dL    ALT 16 10 - 52 U/L   Protime-INR   Result Value Ref Range    Protime 13.9 (H) 9.8 - 12.4 seconds    INR 1.3 (H) 0.9 - 1.1   aPTT   Result Value Ref Range    aPTT 30  26 - 36 seconds   Morphology   Result Value Ref Range    RBC Morphology See Below     Polychromasia Mild     Hypochromia Mild     Ovalocytes Few     Liat Cells Few     Basophilic Stippling Present       Imaging  No results found.    Cardiology, Vascular, and Other Imaging  No other imaging results found for the past 2 days       PAST HISTORIES AND ALLERGIES     Past Medical History  He has a past medical history of Other specified health status.    Surgical History  He has a past surgical history that includes Other surgical history (11/13/2018) and Other surgical history (07/22/2021).     Social History  He reports that he quit smoking about 42 years ago. His smoking use included cigarettes. He started smoking about 62 years ago. He has a 20 pack-year smoking history. He has never used smokeless tobacco. He reports current alcohol use of about 1.0 standard drink of alcohol per week. He reports that he does not use drugs.    Family History  Family History[1]    Allergies  Patient has no known allergies.    MEDICATIONS     Scheduled Medications:  Scheduled Medications[2]  Continuous Medications:  Continuous Medications[3]  PRN Medications:  PRN Medications[4]     REVIEW OF SYSTEMS     Review of Systems    OBJECTIVE     Last Recorded Vitals  /78 (BP Location: Right arm, Patient Position: Sitting)   Pulse (!) 116   Temp 36.3 °C (97.3 °F) (Temporal)   Resp (!) 24   Wt 77.1 kg (170 lb)   SpO2 98%      Physical Exam:  Vital signs and nursing notes reviewed.   Constitutional: Pleasant and cooperative. Laying in bed in no acute distress. Conversant.   Skin: Warm and dry; no obvious lesions, rashes, pallor, or jaundice.   Eyes: EOMI. Anicteric sclera.   ENT: Mucous membranes moist; no obvious injury or deformity appreciated.   Head and Neck: Normocephalic, atraumatic. ROM preserved. Trachea midline. No appreciable JVD.   Respiratory: Nonlabored on 2 L. Lungs clear to auscultation bilaterally without obvious  adventitious sounds. Chest rise is equal.  Cardiovascular: RRR. No gross murmur, gallop, or rub. Extremities are warm and well-perfused with good capillary refill (< 3 seconds). No chest wall tenderness.   GI: Abdomen soft, nontender, nondistended. No obvious organomegaly appreciated. Bowel sounds are present.  : Agarwal catheter set up to CBI draining dark sanguinous urine  MSK: No gross abnormalities appreciated. No limitations to AROM/PROM appreciated.   Extremities: No cyanosis, edema, or clubbing evident. Neurovascularly intact.   Neuro: A&Ox3. CN 2-12 grossly intact. Able to respond to questions appropriately and clearly. No acute focal neurologic deficits appreciated.  Psych: Appropriate mood and behavior.    ASSESSMENT AND PLAN   Assessment/Plan     80 y.o. 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    BPH with chronic urinary retention Agarwal dependent presenting with hematuria  Three-way catheter is in place patient continues on CBI  Defer chemical DVT prophylaxis for now given active bleeding and patient in anemic state  Consult urology for further recommendations    Complicated urinary tract infection, CAUTI-POA  Patient has persistent leukocytosis and given replacement of Agarwal catheters will cover with Rocephin  Follow-up urine cultures  Note the patient was treated possibly with Cipro prior to arrival    Anemia likely related to acute blood loss  We will check iron studies consider iron replacement therapy patient will be typed and screened also   monitor H&H  Avoid chemical VTE prophylaxis    Hypertension  Reconcile the patient's home antihypertensive medications  Monitor closely for evidence of hypotension    Hyperlipidemia  Continue patient's home statin       Gray Moralez, APRN-CNP    Dragon dictation software was used to dictate this note and thus there may be minor errors in  translation/transcription including garbled speech or misspellings. Please contact for clarification if needed.         [1]   Family History  Problem Relation Name Age of Onset    Cancer Mother      Cancer Sister      Hypertension Brother      Hyperlipidemia Brother      Neuropathy Brother      Neuropathy Daughter      Breast cancer Neg Hx     [2] cefTRIAXone, 1 g, intravenous, Once    [3]    [4]

## 2025-07-23 NOTE — ED NOTES
Pt arrives to ED via EMS from homr    Code Status:  No Order    HPI     Chief Complaint   Patient presents with    Blood in Urine       /78 (BP Location: Right arm, Patient Position: Sitting)   Pulse 90   Temp 36.3 °C (97.3 °F) (Temporal)   Resp 18   Wt 77.1 kg (170 lb)   SpO2 98%     [place] Coma Scale Score: 15      LDA:   Peripheral IV 07/23/25 20 G Left Antecubital (Active)   Placement Date/Time: 07/23/25 1025   Placed by External Staff?: EMS  Hand Hygiene Completed: Yes  Size (Gauge): 20 G  Orientation: Left  Location: Antecubital  Site Prep: Alcohol   Number of days: 0       Urethral Catheter  (Active)   No placement date or time found.   Catheter Type: (c)    Number of days:        Continuous Bladder Irrigation Triple-lumen 20 Fr (Active)   Placement Date/Time: 07/23/25 1225   Placed by: Velma KHAN  Hand Hygiene Completed: Yes  Catheter Type: Triple-lumen  Catheter Size: 20 Fr  Balloon Size: 30 mL  Urine Returned: Yes   Number of days: 0        BACKGROUND  Medical History[1]  Surgical History[2]  Medications Ordered Prior to Encounter[3]     ASSESSMENT  ED Course as of 07/23/25 1252   Wed Jul 23, 2025   1101 Spoke to the patient's urologist Dr. Enriquez who agreed to consult on admission. [CJ]   1141 Due to patient's elevated white blood cell count he will receive Rocephin to cover for potential urinary tract infection from recent instrumentation multiple times.  Patient's hemoglobin has also dropped to 9.6.  This is not enough to currently require blood transfusion but will need to be monitored.  I did speak to the hospitalist SOL who agreed to admit the patient at this time.  Patient admitted to medical floor under Dr. Urias. [CJ]   1224 Due to patient's elevated blood cell count with with the UTI present on urinalysis patient will be marked as a sepsis alert at this time.  Patient will have blood cultures added.  Patient already did receive 1 g of Rocephin.  If needed patient will receive an  additional gram of Rocephin. [CJ]      ED Course User Index  [CJ] Ricco Huynh PA-C         Diagnoses as of 07/23/25 1252   Hematuria, unspecified type   Anemia, unspecified type   Sepsis, due to unspecified organism, unspecified whether acute organ dysfunction present (Multi)       Medications Currently Running:  Continuous Medications[4]     Medications Given:  ED Medication Administration from 07/23/2025 1022 to 07/23/2025 1252         Date/Time Order Dose Route Action Action by     07/23/2025 1118 EDT fentaNYL PF (Sublimaze) injection 50 mcg 50 mcg intravenous Given English,      07/23/2025 1118 EDT lidocaine 2 % mucosal jelly (Uro-Jet) 1 Application 1 Application urethral Given English,      07/23/2025 1152 EDT cefTRIAXone (Rocephin) 1 g in dextrose (iso) IV 50 mL 1 g intravenous New Bag CELESTINO Avalos                 RESULTS    Imaging:  No orders to display      }  Labs ::99  Abnormal Labs Reviewed   CBC WITH AUTO DIFFERENTIAL - Abnormal; Notable for the following components:       Result Value    WBC 20.6 (*)     RBC 3.09 (*)     Hemoglobin 9.6 (*)     Hematocrit 27.5 (*)     Immature Granulocytes %, Automated 3.9 (*)     Neutrophils Absolute 16.47 (*)     Immature Granulocytes Absolute, Automated 0.81 (*)     Monocytes Absolute 2.41 (*)     All other components within normal limits   COMPREHENSIVE METABOLIC PANEL - Abnormal; Notable for the following components:    Glucose 142 (*)     Sodium 135 (*)     Urea Nitrogen 53 (*)     Creatinine 1.46 (*)     eGFR 48 (*)     All other components within normal limits   PROTIME-INR - Abnormal; Notable for the following components:    Protime 13.9 (*)     INR 1.3 (*)     All other components within normal limits   URINALYSIS WITH REFLEX CULTURE AND MICROSCOPIC - Abnormal; Notable for the following components:    Color, Urine Red (*)     Appearance, Urine Turbid (*)     Specific Gravity, Urine >1.030 (*)     Protein, Urine 100 (2+) (*)     Blood, Urine 1.0 (3+) (*)      Urobilinogen, Urine 2 (1+) (*)     Leukocyte Esterase, Urine 250 Wilman/uL (*)     All other components within normal limits   MICROSCOPIC ONLY, URINE - Abnormal; Notable for the following components:    WBC, Urine 21-50 (*)     RBC, Urine >20 (*)     All other components within normal limits                   [1]   Past Medical History:  Diagnosis Date    Other specified health status     No pertinent past medical history   [2]   Past Surgical History:  Procedure Laterality Date    OTHER SURGICAL HISTORY  11/13/2018    Prostate needle biopsy    OTHER SURGICAL HISTORY  07/22/2021    Neck surgery   [3]   No current facility-administered medications on file prior to encounter.     Current Outpatient Medications on File Prior to Encounter   Medication Sig Dispense Refill    ciprofloxacin (Cipro) 500 mg tablet Take 1 tablet (500 mg) by mouth 2 times a day for 7 days. 14 tablet 0    fluticasone (Flonase) 50 mcg/actuation nasal spray Administer 1 spray into each nostril once daily. Shake gently. Before first use, prime pump. After use, clean tip and replace cap. 48 g 1    gabapentin (Neurontin) 300 mg capsule Take 1 capsule (300 mg) by mouth 4 times a day.      ibuprofen 200 mg tablet Take by mouth every 6 hours if needed for mild pain (1 - 3).      SAW PALMETTO ORAL Take by mouth once daily.      simvastatin (Zocor) 20 mg tablet Take 1 tablet (20 mg) by mouth once daily. 90 tablet 1    tamsulosin (Flomax) 0.4 mg 24 hr capsule TAKE 1 CAPSULE BY MOUTH DAILY 90 capsule 3    vit C/E/cuperic/zinc/lutein (PRESERVISION LUTEIN ORAL) Take by mouth once daily.     [4]         Alana Avalos RN  07/23/25 3874

## 2025-07-23 NOTE — CARE PLAN
The patient's goals for the shift include  less clots    The clinical goals for the shift include decrease clots in urine

## 2025-07-24 LAB
ABO GROUP (TYPE) IN BLOOD: NORMAL
ANION GAP SERPL CALC-SCNC: 12 MMOL/L (ref 10–20)
BUN SERPL-MCNC: 55 MG/DL (ref 6–23)
CALCIUM SERPL-MCNC: 8.2 MG/DL (ref 8.6–10.3)
CHLORIDE SERPL-SCNC: 104 MMOL/L (ref 98–107)
CO2 SERPL-SCNC: 24 MMOL/L (ref 21–32)
CREAT SERPL-MCNC: 1.45 MG/DL (ref 0.5–1.3)
EGFRCR SERPLBLD CKD-EPI 2021: 48 ML/MIN/1.73M*2
ERYTHROCYTE [DISTWIDTH] IN BLOOD BY AUTOMATED COUNT: 13.1 % (ref 11.5–14.5)
GLUCOSE SERPL-MCNC: 112 MG/DL (ref 74–99)
HCT VFR BLD AUTO: 21.9 % (ref 41–52)
HGB BLD-MCNC: 7.4 G/DL (ref 13.5–17.5)
HOLD SPECIMEN: NORMAL
MCH RBC QN AUTO: 30 PG (ref 26–34)
MCHC RBC AUTO-ENTMCNC: 33.8 G/DL (ref 32–36)
MCV RBC AUTO: 89 FL (ref 80–100)
NRBC BLD-RTO: 0 /100 WBCS (ref 0–0)
PLATELET # BLD AUTO: 249 X10*3/UL (ref 150–450)
POTASSIUM SERPL-SCNC: 3.6 MMOL/L (ref 3.5–5.3)
RBC # BLD AUTO: 2.47 X10*6/UL (ref 4.5–5.9)
RH FACTOR (ANTIGEN D): NORMAL
SODIUM SERPL-SCNC: 136 MMOL/L (ref 136–145)
WBC # BLD AUTO: 12.3 X10*3/UL (ref 4.4–11.3)

## 2025-07-24 PROCEDURE — P9016 RBC LEUKOCYTES REDUCED: HCPCS

## 2025-07-24 PROCEDURE — 36415 COLL VENOUS BLD VENIPUNCTURE: CPT | Performed by: INTERNAL MEDICINE

## 2025-07-24 PROCEDURE — 36415 COLL VENOUS BLD VENIPUNCTURE: CPT | Performed by: NURSE PRACTITIONER

## 2025-07-24 PROCEDURE — 1200000002 HC GENERAL ROOM WITH TELEMETRY DAILY

## 2025-07-24 PROCEDURE — 2500000004 HC RX 250 GENERAL PHARMACY W/ HCPCS (ALT 636 FOR OP/ED): Performed by: NURSE PRACTITIONER

## 2025-07-24 PROCEDURE — 2500000001 HC RX 250 WO HCPCS SELF ADMINISTERED DRUGS (ALT 637 FOR MEDICARE OP): Performed by: NURSE PRACTITIONER

## 2025-07-24 PROCEDURE — 85027 COMPLETE CBC AUTOMATED: CPT | Performed by: NURSE PRACTITIONER

## 2025-07-24 PROCEDURE — 2500000002 HC RX 250 W HCPCS SELF ADMINISTERED DRUGS (ALT 637 FOR MEDICARE OP, ALT 636 FOR OP/ED): Performed by: NURSE PRACTITIONER

## 2025-07-24 PROCEDURE — 99233 SBSQ HOSP IP/OBS HIGH 50: CPT | Performed by: INTERNAL MEDICINE

## 2025-07-24 PROCEDURE — 99232 SBSQ HOSP IP/OBS MODERATE 35: CPT | Performed by: UROLOGY

## 2025-07-24 PROCEDURE — 36430 TRANSFUSION BLD/BLD COMPNT: CPT

## 2025-07-24 PROCEDURE — 80048 BASIC METABOLIC PNL TOTAL CA: CPT | Performed by: NURSE PRACTITIONER

## 2025-07-24 RX ADMIN — PANTOPRAZOLE SODIUM 40 MG: 40 TABLET, DELAYED RELEASE ORAL at 06:41

## 2025-07-24 RX ADMIN — GABAPENTIN 300 MG: 300 CAPSULE ORAL at 17:41

## 2025-07-24 RX ADMIN — GABAPENTIN 300 MG: 300 CAPSULE ORAL at 20:45

## 2025-07-24 RX ADMIN — SIMVASTATIN 20 MG: 20 TABLET, FILM COATED ORAL at 20:45

## 2025-07-24 RX ADMIN — TAMSULOSIN HYDROCHLORIDE 0.4 MG: 0.4 CAPSULE ORAL at 08:06

## 2025-07-24 RX ADMIN — CEFTRIAXONE 1 G: 1 INJECTION, SOLUTION INTRAVENOUS at 11:32

## 2025-07-24 RX ADMIN — GABAPENTIN 300 MG: 300 CAPSULE ORAL at 06:41

## 2025-07-24 RX ADMIN — GABAPENTIN 300 MG: 300 CAPSULE ORAL at 12:20

## 2025-07-24 ASSESSMENT — COGNITIVE AND FUNCTIONAL STATUS - GENERAL
MOBILITY SCORE: 21
STANDING UP FROM CHAIR USING ARMS: A LITTLE
DAILY ACTIVITIY SCORE: 23
CLIMB 3 TO 5 STEPS WITH RAILING: A LITTLE
WALKING IN HOSPITAL ROOM: A LITTLE
DRESSING REGULAR LOWER BODY CLOTHING: A LITTLE

## 2025-07-24 ASSESSMENT — PAIN SCALES - GENERAL
PAINLEVEL_OUTOF10: 0 - NO PAIN
PAINLEVEL_OUTOF10: 0 - NO PAIN

## 2025-07-24 ASSESSMENT — PAIN - FUNCTIONAL ASSESSMENT: PAIN_FUNCTIONAL_ASSESSMENT: 0-10

## 2025-07-24 NOTE — PROGRESS NOTES
"Benjamin Mae is a 80 y.o. male on day 1 of admission presenting with Hematuria, unspecified type.    Subjective   Intermittent bladder spasm       Objective     CBI working, urine light pink to clear    Hemoglobin 7.4    Physical Exam    Last Recorded Vitals  Blood pressure 116/64, pulse 85, temperature 36.6 °C (97.8 °F), temperature source Temporal, resp. rate 17, height 1.753 m (5' 9\"), weight 77.1 kg (170 lb), SpO2 97%.  Intake/Output last 3 Shifts:  I/O last 3 completed shifts:  In: 320 (4.1 mL/kg) [P.O.:320]  Out: 06353 (368 mL/kg) [Urine:26542 (10.2 mL/kg/hr)]  Weight: 77.1 kg     Relevant Results               This patient has a urinary catheter   Reason for the urinary catheter remaining today? urinary retention/bladder outlet obstruction, acute or chronic               Assessment & Plan  Hematuria, unspecified type    Impression  Gross hematuria, clot retention  BPH    Plan  Continue three-way Simplastic catheter  Continue bladder irrigation  Manual irrigation as needed   May dc home with agarwal third port plugged when urine clear  elective cystoscopy as outpatient scheduled on 28th      I spent 25 minutes in the professional and overall care of this patient.      Kumar Enriquez MD    "

## 2025-07-24 NOTE — PROGRESS NOTES
Physical Therapy                 Therapy Communication Note    Patient Name: Benjamin Mae  MRN: [id number]  Department: [address]  Room: [address] [place], [place] [address]  Today's Date: 7/24/2025     Discipline: Physical Therapy    Missed Visit: PT Missed Visit: Yes     Missed Visit Reason: Missed Visit Reason:  (ATTEMPTED TO SEE FOR THERAPY 2X TODAY: 1104- C/O LIGHTHEADED WHEN HE SITS UP ON EOB SO WANTS TO DEFER THERAPY; CHECKED BACK W/ RN IN PM-1425- PT'S STILL LIGHTHEADED &REMAINS LOW SO BEST NO THERAPY TODAY)    Missed Time: Attempt    Comment:

## 2025-07-24 NOTE — CARE PLAN
The patient's goals for the shift include  comfort    The clinical goals for the shift include pt will remain HDS this shift

## 2025-07-24 NOTE — PROGRESS NOTES
Benjamin Mae is a 80 y.o. male on day 1 of admission presenting with Hematuria, unspecified type.      Subjective   The patient is seen well this morning.  He states he gets dizzy when he gets up suddenly to walk.  He is currently undergoing CBI Guadarrama bag with hematuria.  The patient at this time denies any other complaints.       Objective     Last Recorded Vitals  /66 (BP Location: Right arm, Patient Position: Lying)   Pulse 89   Temp 36.4 °C (97.6 °F) (Temporal)   Resp 18   Wt 77.1 kg (170 lb)   SpO2 95%   Intake/Output last 3 Shifts:    Intake/Output Summary (Last 24 hours) at 7/24/2025 1500  Last data filed at 7/24/2025 1359  Gross per 24 hour   Intake 1220 ml   Output 92448 ml   Net -56254 ml       Admission Weight  Weight: 77.1 kg (170 lb) (07/23/25 1025)    Daily Weight  07/23/25 : 77.1 kg (170 lb)    Image Results  Electrocardiogram 12 Lead  Sinus rhythm  Left anterior fascicular block  Abnormal R-wave progression, early transition  Minimal ST elevation, anterolateral leads    Confirmed by ALMA ZELAYA MD ([id number]) on 12/20/2019 10:40:56 AM      Physical Exam  HENT:      Head: Normocephalic and atraumatic.      Nose: Nose normal.      Mouth/Throat:      Mouth: Mucous membranes are dry.     Eyes:      Extraocular Movements: Extraocular movements intact.      Conjunctiva/sclera: Conjunctivae normal.       Cardiovascular:      Rate and Rhythm: Normal rate and regular rhythm.      Pulses: Normal pulses.      Heart sounds: Normal heart sounds.   Pulmonary:      Effort: Pulmonary effort is normal.      Breath sounds: Normal breath sounds.   Abdominal:      General: Bowel sounds are normal.      Palpations: Abdomen is soft.   Genitourinary:     Comments: Guadarrama in place with hematuria    Musculoskeletal:         General: Normal range of motion.      Cervical back: Normal range of motion and neck supple.     Skin:     General: Skin is warm and dry.     Neurological:      General: No focal deficit present.       Mental Status: He is alert and oriented to person, place, and time. Mental status is at baseline.     Psychiatric:         Mood and Affect: Mood normal.         Relevant Results                      Benjamin Mae is a 80 y.o. 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  Patient has a three-way Agarwal catheter in place.  On recent presenting to the hospital, his catheter was evaluated by urology and irrigated clots were removed patient was placed on Cipro.  Admitted this time with abdominal pain and Agarwal catheter not draining.  The patient also with ongoing hematuria.  Hemoglobin progressively dropped.       Assessment & Plan  Hematuria, unspecified type      BPH with chronic urinary retention Agarwal dependent presenting with hematuria  -Three-way catheter is in place patient continues on CBI  - Urology were consulted for gross hematuria and clot retention.  Currently continue with bladder irrigation continue with three-way catheter.  Also manual irrigation as needed plans for elective cystoscopy as outpatient scheduled on the 28th.     Complicated urinary tract infection, CAUTI-POA  -Continue with ceftriaxone at this time   -Follow-up urine cultures  -Note the patient was treated possibly with Cipro prior to arrival     Acute blood loss anemia  Hemoglobin progressively dropping noted to be 7.4 today.  Type and screen in place may need transfusion tomorrow.  monitor H&H     Hypertension  Hold off home antihypertensive in setting of acute blood loss anemia     Hyperlipidemia  Continue patient's home statin           Sandra Lopez MD

## 2025-07-24 NOTE — NURSING NOTE
Patient progress throughout shift, remained free from injury and falls, continued agarwal irrigation went through several irrigation bags, two episodes of pain and clots, red to pink output, patient slept majority of night and feeling more relief.

## 2025-07-24 NOTE — CARE PLAN
The patient's goals for the shift include  no pain from agarwal    The clinical goals for the shift include patient to remain free from injury and falls, verbalize any pain or discomfort, patient safety, maintain continuous agarwal irrigation      Problem: Pain - Adult  Goal: Verbalizes/displays adequate comfort level or baseline comfort level  Outcome: Progressing     Problem: Safety - Adult  Goal: Free from fall injury  Outcome: Progressing  Flowsheets (Taken 7/24/2025 0246)  Free from fall injury: Instruct family/caregiver on patient safety     Problem: Discharge Planning  Goal: Discharge to home or other facility with appropriate resources  Outcome: Progressing     Problem: Chronic Conditions and Co-morbidities  Goal: Patient's chronic conditions and co-morbidity symptoms are monitored and maintained or improved  Outcome: Progressing     Problem: Nutrition  Goal: Nutrient intake appropriate for maintaining nutritional needs  Outcome: Progressing

## 2025-07-24 NOTE — NURSING NOTE
End of shift note:  Pt still with CBI running. Dark pink/red urine with small clots. One large clot noted today that blocked emptying of agarwal bag briefly. Pt had dizziness today and did not work with PT. 1 unit of PRBC's infusing now.

## 2025-07-24 NOTE — PROGRESS NOTES
Occupational Therapy                 Therapy Communication Note    Patient Name: Benjamin Mae  MRN: [id number]  Department: [facility] 2 E  Room: [address] [place] [address]  Today's Date: 7/24/2025     Discipline: Occupational Therapy    Missed Visit: OT Missed Visit: Yes     Missed Visit Reason: Missed Visit Reason: Patient refused (pt. reported  he is dizzy and feeling weak,)    Missed Time: Attempt    Comment:

## 2025-07-24 NOTE — PROGRESS NOTES
07/24/25 1100   Discharge Planning   Living Arrangements Alone   Support Systems Children   Type of Residence Private residence   Who is requesting discharge planning? Provider   Home or Post Acute Services None   Expected Discharge Disposition Home   Does the patient need discharge transport arranged? No     SW met with pt to complete discharge planning assessment, introduced self and role with Care Transitions. Pt is from home alone, reports being independent in all ADL/IADLs. PCP is Dr. Cooper, denies any home going concerns at this time. Pt plans to return home with no needs at [place], SW discussed [facility] and pt declined at this time. Care Transitions to follow as needed.    Sorin Carter, MSW, LSW ([id number])   Care Transitions

## 2025-07-25 LAB
ANION GAP SERPL CALC-SCNC: 9 MMOL/L (ref 10–20)
BACTERIA UR CULT: NO GROWTH
BASOPHILS # BLD MANUAL: 0 X10*3/UL (ref 0–0.1)
BASOPHILS # BLD MANUAL: 0 X10*3/UL (ref 0–0.1)
BASOPHILS NFR BLD MANUAL: 0 %
BASOPHILS NFR BLD MANUAL: 0 %
BLOOD EXPIRATION DATE: NORMAL
BLOOD EXPIRATION DATE: NORMAL
BUN SERPL-MCNC: 39 MG/DL (ref 6–23)
CALCIUM SERPL-MCNC: 8.5 MG/DL (ref 8.6–10.3)
CHLORIDE SERPL-SCNC: 108 MMOL/L (ref 98–107)
CO2 SERPL-SCNC: 25 MMOL/L (ref 21–32)
CREAT SERPL-MCNC: 1.11 MG/DL (ref 0.5–1.3)
DISPENSE STATUS: NORMAL
DISPENSE STATUS: NORMAL
EGFRCR SERPLBLD CKD-EPI 2021: 66 ML/MIN/1.73M*2
EOSINOPHIL # BLD MANUAL: 0.51 X10*3/UL (ref 0–0.4)
EOSINOPHIL # BLD MANUAL: 0.81 X10*3/UL (ref 0–0.4)
EOSINOPHIL NFR BLD MANUAL: 4 %
EOSINOPHIL NFR BLD MANUAL: 7 %
ERYTHROCYTE [DISTWIDTH] IN BLOOD BY AUTOMATED COUNT: 13.7 % (ref 11.5–14.5)
ERYTHROCYTE [DISTWIDTH] IN BLOOD BY AUTOMATED COUNT: 13.9 % (ref 11.5–14.5)
GLUCOSE SERPL-MCNC: 98 MG/DL (ref 74–99)
HCT VFR BLD AUTO: 22.5 % (ref 41–52)
HCT VFR BLD AUTO: 26.4 % (ref 41–52)
HGB BLD-MCNC: 7.5 G/DL (ref 13.5–17.5)
HGB BLD-MCNC: 9.1 G/DL (ref 13.5–17.5)
HYPOCHROMIA BLD QL SMEAR: ABNORMAL
IMM GRANULOCYTES # BLD AUTO: 1.48 X10*3/UL (ref 0–0.5)
IMM GRANULOCYTES # BLD AUTO: 1.77 X10*3/UL (ref 0–0.5)
IMM GRANULOCYTES NFR BLD AUTO: 12.8 % (ref 0–0.9)
IMM GRANULOCYTES NFR BLD AUTO: 13.9 % (ref 0–0.9)
LYMPHOCYTES # BLD MANUAL: 1.39 X10*3/UL (ref 0.8–3)
LYMPHOCYTES # BLD MANUAL: 1.79 X10*3/UL (ref 0.8–3)
LYMPHOCYTES NFR BLD MANUAL: 12 %
LYMPHOCYTES NFR BLD MANUAL: 14 %
MCH RBC QN AUTO: 29.9 PG (ref 26–34)
MCH RBC QN AUTO: 30.7 PG (ref 26–34)
MCHC RBC AUTO-ENTMCNC: 33.3 G/DL (ref 32–36)
MCHC RBC AUTO-ENTMCNC: 34.5 G/DL (ref 32–36)
MCV RBC AUTO: 89 FL (ref 80–100)
MCV RBC AUTO: 90 FL (ref 80–100)
METAMYELOCYTES # BLD MANUAL: 0.64 X10*3/UL
METAMYELOCYTES # BLD MANUAL: 1.16 X10*3/UL
METAMYELOCYTES NFR BLD MANUAL: 10 %
METAMYELOCYTES NFR BLD MANUAL: 5 %
MONOCYTES # BLD MANUAL: 0.9 X10*3/UL (ref 0.05–0.8)
MONOCYTES # BLD MANUAL: 1.16 X10*3/UL (ref 0.05–0.8)
MONOCYTES NFR BLD MANUAL: 10 %
MONOCYTES NFR BLD MANUAL: 7 %
MYELOCYTES # BLD MANUAL: 0.23 X10*3/UL
MYELOCYTES NFR BLD MANUAL: 2 %
NEUTROPHILS # BLD MANUAL: 6.85 X10*3/UL (ref 1.6–5.5)
NEUTROPHILS # BLD MANUAL: 8.96 X10*3/UL (ref 1.6–5.5)
NEUTS BAND # BLD MANUAL: 0.12 X10*3/UL (ref 0–0.5)
NEUTS BAND # BLD MANUAL: 0.51 X10*3/UL (ref 0–0.5)
NEUTS BAND NFR BLD MANUAL: 1 %
NEUTS BAND NFR BLD MANUAL: 4 %
NEUTS SEG # BLD MANUAL: 6.73 X10*3/UL (ref 1.6–5)
NEUTS SEG # BLD MANUAL: 8.45 X10*3/UL (ref 1.6–5)
NEUTS SEG NFR BLD MANUAL: 58 %
NEUTS SEG NFR BLD MANUAL: 66 %
NRBC BLD-RTO: 0.5 /100 WBCS (ref 0–0)
NRBC BLD-RTO: 0.6 /100 WBCS (ref 0–0)
PLATELET # BLD AUTO: 250 X10*3/UL (ref 150–450)
PLATELET # BLD AUTO: 258 X10*3/UL (ref 150–450)
POLYCHROMASIA BLD QL SMEAR: ABNORMAL
POTASSIUM SERPL-SCNC: 3.8 MMOL/L (ref 3.5–5.3)
PRODUCT BLOOD TYPE: 5100
PRODUCT BLOOD TYPE: 5100
PRODUCT CODE: NORMAL
PRODUCT CODE: NORMAL
RBC # BLD AUTO: 2.51 X10*6/UL (ref 4.5–5.9)
RBC # BLD AUTO: 2.96 X10*6/UL (ref 4.5–5.9)
RBC MORPH BLD: ABNORMAL
RBC MORPH BLD: ABNORMAL
SODIUM SERPL-SCNC: 138 MMOL/L (ref 136–145)
TOTAL CELLS COUNTED BLD: 100
TOTAL CELLS COUNTED BLD: 100
UNIT ABO: NORMAL
UNIT ABO: NORMAL
UNIT NUMBER: NORMAL
UNIT NUMBER: NORMAL
UNIT RH: NORMAL
UNIT RH: NORMAL
UNIT VOLUME: 350
UNIT VOLUME: 350
WBC # BLD AUTO: 11.6 X10*3/UL (ref 4.4–11.3)
WBC # BLD AUTO: 12.8 X10*3/UL (ref 4.4–11.3)
XM INTEP: NORMAL
XM INTEP: NORMAL

## 2025-07-25 PROCEDURE — 85007 BL SMEAR W/DIFF WBC COUNT: CPT | Performed by: INTERNAL MEDICINE

## 2025-07-25 PROCEDURE — 2500000004 HC RX 250 GENERAL PHARMACY W/ HCPCS (ALT 636 FOR OP/ED): Performed by: NURSE PRACTITIONER

## 2025-07-25 PROCEDURE — P9016 RBC LEUKOCYTES REDUCED: HCPCS

## 2025-07-25 PROCEDURE — 99232 SBSQ HOSP IP/OBS MODERATE 35: CPT | Performed by: UROLOGY

## 2025-07-25 PROCEDURE — 36415 COLL VENOUS BLD VENIPUNCTURE: CPT | Performed by: INTERNAL MEDICINE

## 2025-07-25 PROCEDURE — 2500000005 HC RX 250 GENERAL PHARMACY W/O HCPCS: Performed by: STUDENT IN AN ORGANIZED HEALTH CARE EDUCATION/TRAINING PROGRAM

## 2025-07-25 PROCEDURE — 80048 BASIC METABOLIC PNL TOTAL CA: CPT | Performed by: INTERNAL MEDICINE

## 2025-07-25 PROCEDURE — 85027 COMPLETE CBC AUTOMATED: CPT | Performed by: INTERNAL MEDICINE

## 2025-07-25 PROCEDURE — 2500000002 HC RX 250 W HCPCS SELF ADMINISTERED DRUGS (ALT 637 FOR MEDICARE OP, ALT 636 FOR OP/ED): Performed by: NURSE PRACTITIONER

## 2025-07-25 PROCEDURE — 99233 SBSQ HOSP IP/OBS HIGH 50: CPT | Performed by: INTERNAL MEDICINE

## 2025-07-25 PROCEDURE — 36430 TRANSFUSION BLD/BLD COMPNT: CPT

## 2025-07-25 PROCEDURE — 2500000001 HC RX 250 WO HCPCS SELF ADMINISTERED DRUGS (ALT 637 FOR MEDICARE OP): Performed by: NURSE PRACTITIONER

## 2025-07-25 PROCEDURE — 2500000005 HC RX 250 GENERAL PHARMACY W/O HCPCS: Performed by: NURSE PRACTITIONER

## 2025-07-25 PROCEDURE — 1200000002 HC GENERAL ROOM WITH TELEMETRY DAILY

## 2025-07-25 RX ORDER — SODIUM CHLORIDE 0.9 G/100ML
1000 INJECTION, SOLUTION IRRIGATION CONTINUOUS
Status: DISCONTINUED | OUTPATIENT
Start: 2025-07-25 | End: 2025-07-25

## 2025-07-25 RX ORDER — SODIUM CHLORIDE 0.9 G/100ML
3000 INJECTION, SOLUTION IRRIGATION CONTINUOUS
Status: DISCONTINUED | OUTPATIENT
Start: 2025-07-25 | End: 2025-07-30 | Stop reason: HOSPADM

## 2025-07-25 RX ADMIN — SODIUM CHLORIDE 3000 ML: 900 IRRIGANT IRRIGATION at 06:14

## 2025-07-25 RX ADMIN — GABAPENTIN 300 MG: 300 CAPSULE ORAL at 11:56

## 2025-07-25 RX ADMIN — SODIUM CHLORIDE 3000 ML: 900 IRRIGANT IRRIGATION at 00:47

## 2025-07-25 RX ADMIN — SODIUM CHLORIDE 3000 ML: 900 IRRIGANT IRRIGATION at 13:59

## 2025-07-25 RX ADMIN — SODIUM CHLORIDE 3000 ML: 900 IRRIGANT IRRIGATION at 12:01

## 2025-07-25 RX ADMIN — GABAPENTIN 300 MG: 300 CAPSULE ORAL at 21:10

## 2025-07-25 RX ADMIN — TAMSULOSIN HYDROCHLORIDE 0.4 MG: 0.4 CAPSULE ORAL at 08:42

## 2025-07-25 RX ADMIN — PANTOPRAZOLE SODIUM 40 MG: 40 TABLET, DELAYED RELEASE ORAL at 06:16

## 2025-07-25 RX ADMIN — SODIUM CHLORIDE 3000 ML: 900 IRRIGANT IRRIGATION at 16:24

## 2025-07-25 RX ADMIN — SODIUM CHLORIDE 3000 ML: 900 IRRIGANT IRRIGATION at 04:14

## 2025-07-25 RX ADMIN — SODIUM CHLORIDE 3000 ML: 900 IRRIGANT IRRIGATION at 23:28

## 2025-07-25 RX ADMIN — GABAPENTIN 300 MG: 300 CAPSULE ORAL at 06:16

## 2025-07-25 RX ADMIN — Medication 3 MG: at 21:10

## 2025-07-25 RX ADMIN — SODIUM CHLORIDE 3000 ML: 900 IRRIGANT IRRIGATION at 10:19

## 2025-07-25 RX ADMIN — SODIUM CHLORIDE 3000 ML: 900 IRRIGANT IRRIGATION at 03:32

## 2025-07-25 RX ADMIN — CEFTRIAXONE 1 G: 1 INJECTION, SOLUTION INTRAVENOUS at 10:57

## 2025-07-25 RX ADMIN — SIMVASTATIN 20 MG: 20 TABLET, FILM COATED ORAL at 21:10

## 2025-07-25 RX ADMIN — SODIUM CHLORIDE 3000 ML: 900 IRRIGANT IRRIGATION at 08:09

## 2025-07-25 RX ADMIN — GABAPENTIN 300 MG: 300 CAPSULE ORAL at 16:23

## 2025-07-25 RX ADMIN — SODIUM CHLORIDE 3000 ML: 900 IRRIGANT IRRIGATION at 02:29

## 2025-07-25 RX ADMIN — SODIUM CHLORIDE 3000 ML: 900 IRRIGANT IRRIGATION at 18:41

## 2025-07-25 RX ADMIN — SODIUM CHLORIDE 3000 ML: 900 IRRIGANT IRRIGATION at 21:00

## 2025-07-25 ASSESSMENT — COGNITIVE AND FUNCTIONAL STATUS - GENERAL
TURNING FROM BACK TO SIDE WHILE IN FLAT BAD: A LITTLE
PERSONAL GROOMING: A LITTLE
STANDING UP FROM CHAIR USING ARMS: A LITTLE
TURNING FROM BACK TO SIDE WHILE IN FLAT BAD: A LITTLE
HELP NEEDED FOR BATHING: A LOT
WALKING IN HOSPITAL ROOM: A LOT
DAILY ACTIVITIY SCORE: 16
DRESSING REGULAR LOWER BODY CLOTHING: A LOT
CLIMB 3 TO 5 STEPS WITH RAILING: A LOT
CLIMB 3 TO 5 STEPS WITH RAILING: A LOT
MOBILITY SCORE: 16
PERSONAL GROOMING: A LITTLE
STANDING UP FROM CHAIR USING ARMS: A LITTLE
TOILETING: A LOT
DRESSING REGULAR UPPER BODY CLOTHING: A LITTLE
DRESSING REGULAR UPPER BODY CLOTHING: A LITTLE
MOBILITY SCORE: 16
MOVING TO AND FROM BED TO CHAIR: A LOT
MOVING TO AND FROM BED TO CHAIR: A LOT
DRESSING REGULAR LOWER BODY CLOTHING: A LOT
WALKING IN HOSPITAL ROOM: A LOT
HELP NEEDED FOR BATHING: A LOT
DAILY ACTIVITIY SCORE: 16
TOILETING: A LOT

## 2025-07-25 ASSESSMENT — PAIN - FUNCTIONAL ASSESSMENT: PAIN_FUNCTIONAL_ASSESSMENT: 0-10

## 2025-07-25 ASSESSMENT — PAIN SCALES - GENERAL
PAINLEVEL_OUTOF10: 0 - NO PAIN
PAINLEVEL_OUTOF10: 0 - NO PAIN

## 2025-07-25 NOTE — CARE PLAN
The patient's goals for the shift include  no urethral pain    The clinical goals for the shift include pt to remian hemodynamically stable during this shift    Over the shift, the patient did not make progress toward the following goals. Barriers to progression include hematuria, clots in urethra. Recommendations to address these barriers include CBI.

## 2025-07-25 NOTE — PROGRESS NOTES
"Benjamin Mae is a 80 y.o. male on day 2 of admission presenting with Hematuria, unspecified type.    Subjective   Intermittent bladder spasm       Objective   Continue gross hematuria requiring continue bladder irrigation    Tmax 97.6, T-current 97.2    White count 11.6, hemoglobin 7.5 stable creatinine 1.11    Physical Exam    Last Recorded Vitals  Blood pressure 127/67, pulse 77, temperature 36.4 °C (97.5 °F), temperature source Temporal, resp. rate 16, height 1.753 m (5' 9\"), weight 77.1 kg (170 lb), SpO2 96%.  Intake/Output last 3 Shifts:  I/O last 3 completed shifts:  In: 1250 (16.2 mL/kg) [P.O.:850; Blood:350; IV Piggyback:50]  Out: -66477 (-130.3 mL/kg)   Weight: 77.1 kg     Relevant Results                            Assessment & Plan  Hematuria, unspecified type    Impression  Gross hematuria,   clot retention  BPH    Plan  Continue bladder irrigation, wean off as tolerate  If patient stays over the weekend, or cystoscopy possible resection on Tuesday  If patient discharged, keep next Monday office cystoscopy appointment  Discussed with patient and family      I spent 25 minutes in the professional and overall care of this patient.      Kumar Enriquez MD    "

## 2025-07-25 NOTE — NURSING NOTE
Patient progress throughout shift, remained free from injury and falls, verbalized pain and discomfort related to agarwal catheter irrigation and clots, did not want medication but was thankful for the manual irrigation, and continuous bladder irrigation to help relieve the discomfort.  Patient received one unit of RBCs, administering a second unit end of shift. Management notified

## 2025-07-25 NOTE — PROGRESS NOTES
Occupational Therapy                 Therapy Communication Note    Patient Name: Benjamin Mae  MRN: [id number]  Department: [facility] 2 E  Room: [address] [place] [address]  Today's Date: 7/25/2025     Discipline: Occupational Therapy    Missed Visit: OT Missed Visit: Yes     Missed Visit Reason: Missed Visit Reason: Patient refused (Patient declined due to c/o being light headed when getting OOB and visitors. Provided encouragement and education, patient continued to refuse. Attempted at 1132.)

## 2025-07-25 NOTE — PROGRESS NOTES
Physical Therapy                 Therapy Communication Note    Patient Name: Benjamin Mae  MRN: [id number]  Department: [facility] 2 E  Room: [address] [place], [place] [address]  Today's Date: 7/25/2025     Discipline: Physical Therapy    Missed Visit: PT Missed Visit: Yes     Missed Visit Reason: Missed Visit Reason: Patient refused (Patient declined therapy at this time due to complaint of getting lightheadeded when completing OOB activities and well as stated he has visitors. Provided encouragement and education, patient still unwilling to participate today. Will re-attempt PT eval at a later time.)    Missed Time: Attempt    Comment:      Completion of this session, clinical decision making, and documentation performed under the supervision/direction of Marcelle Rodriguez.      RONALDO ODYLE-PT

## 2025-07-25 NOTE — DOCUMENTATION CLARIFICATION NOTE
PATIENT:               SHIRA DINH  ACCT #:                  [id number]  MRN:                       [id number]  :                       1945  ADMIT DATE:       2025 10:22 AM  DISCH DATE:  RESPONDING PROVIDER #:        [id number]          PROVIDER RESPONSE TEXT:    Sepsis with renal organ dysfunction of LILY    CDI QUERY TEXT:    Clarification      Instruction:    Based on your assessment of the patient and the clinical information, please provide the requested documentation by clicking on the appropriate radio button and enter any additional information if prompted.    Question: Please further clarify if a relationship exists between the Sepsis and acute organ dysfunction    When answering this query, please exercise your independent professional judgment. The fact that a question is being asked, does not imply that any particular answer is desired or expected.    The patient's clinical indicators include:  Clinical Information:  - 79yo male admitted with Catheter associated UTI, hematuria and acute blood loss anemia.    Clinical Indicators:  - VS on arrival  1025:  97.3, 116, 24, 117/78,  98 %  1319:  97.4,  92, 18,  134/76  1659 : 97.4,  99,  18, 90/63,   98%    - Labs on arrival  : Wbc 20.6, hgb 9.6, plt 315, anc 16.47, bs 142, na 135, bun 53,  cr 1.46,  tbili 0.6, la 1.3   :                                                               bs 112               bun 55,  cr 1.45  :  Wbc 11.6, hgb 7.5, plt 258,                   bs 98                 bun 39,  cr 1.11    Treatment:  Rocephin.    Risk Factors: Cauti.  Options provided:  -- Sepsis with renal organ dysfunction of LILY  -- Sepsis with other organ dysfunction, Please specify sepsis associated organ dysfunction below  -- CAUTI and LILY without Sepsis  -- Other - I will add my own diagnosis  -- Refer to Clinical Documentation Reviewer    Query created by: Nessa Tatum on 2025 11:10 AM      Electronically signed by:  ANNABEL WALKER  DONOVAN HOLLOWAY 7/25/2025 1:44 PM

## 2025-07-25 NOTE — CARE PLAN
The patient's goals for the shift include  agarwal care and irrigation of clots    The clinical goals for the shift include patient to remain free from injury and falls, verbalize discomfort and pain, continue bladder irrigation to remove clots.      Problem: Pain - Adult  Goal: Verbalizes/displays adequate comfort level or baseline comfort level  Outcome: Progressing  Flowsheets (Taken 7/25/2025 0310)  Verbalizes/displays adequate comfort level or baseline comfort level:   Assess pain using appropriate pain scale   Administer analgesics based on type and severity of pain and evaluate response   Implement non-pharmacological measures as appropriate and evaluate response     Problem: Safety - Adult  Goal: Free from fall injury  Outcome: Progressing  Flowsheets (Taken 7/25/2025 0310)  Free from fall injury: Instruct family/caregiver on patient safety     Problem: Discharge Planning  Goal: Discharge to home or other facility with appropriate resources  Outcome: Progressing     Problem: Chronic Conditions and Co-morbidities  Goal: Patient's chronic conditions and co-morbidity symptoms are monitored and maintained or improved  Outcome: Progressing     Problem: Nutrition  Goal: Nutrient intake appropriate for maintaining nutritional needs  Outcome: Progressing

## 2025-07-25 NOTE — PROGRESS NOTES
Benjamin Mae is a 80 y.o. male on day 2 of admission presenting with Hematuria, unspecified type.      Subjective   The patient is seen well this morning.  He states he he was still dizzy this morning.  His hemoglobin only improved to 7.5 after 1 unit of PRBC transfusion.  He is to get another unit of PRBC today.  He states he had a large for an hour of clots removed manually this morning as well.  Continues to have hematuria currently undergoing CBI.       Objective     Last Recorded Vitals  /67 (BP Location: Right arm, Patient Position: Lying)   Pulse 77   Temp 36.4 °C (97.5 °F) (Temporal)   Resp 16   Wt 77.1 kg (170 lb)   SpO2 96%   Intake/Output last 3 Shifts:    Intake/Output Summary (Last 24 hours) at 7/25/2025 1412  Last data filed at 7/25/2025 1159  Gross per 24 hour   Intake 965.42 ml   Output -36594 ml   Net 78110.42 ml       Admission Weight  Weight: 77.1 kg (170 lb) (07/23/25 1025)    Daily Weight  07/23/25 : 77.1 kg (170 lb)    Image Results  Electrocardiogram 12 Lead  Sinus rhythm  Left anterior fascicular block  Abnormal R-wave progression, early transition  Minimal ST elevation, anterolateral leads    Confirmed by ALMA ZELAYA MD ([id number]) on 12/20/2019 10:40:56 AM      Physical Exam  HENT:      Head: Normocephalic and atraumatic.      Nose: Nose normal.      Mouth/Throat:      Mouth: Mucous membranes are dry.     Eyes:      Extraocular Movements: Extraocular movements intact.      Conjunctiva/sclera: Conjunctivae normal.       Cardiovascular:      Rate and Rhythm: Normal rate and regular rhythm.      Pulses: Normal pulses.      Heart sounds: Normal heart sounds.   Pulmonary:      Effort: Pulmonary effort is normal.      Breath sounds: Normal breath sounds.   Abdominal:      General: Bowel sounds are normal.      Palpations: Abdomen is soft.   Genitourinary:     Comments: [place] in place with hematuria    Musculoskeletal:         General: Normal range of motion.      Cervical back: Normal  range of motion and neck supple.     Skin:     General: Skin is warm and dry.     Neurological:      General: No focal deficit present.      Mental Status: He is alert and oriented to person, place, and time. Mental status is at baseline.     Psychiatric:         Mood and Affect: Mood normal.         Relevant Results                      Benjamin Mae is a 80 y.o. 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  Patient has a three-way Agarwal catheter in place.  On recent presenting to the hospital, his catheter was evaluated by urology and irrigated clots were removed patient was placed on Cipro.  Admitted this time with abdominal pain and Agarwal catheter not draining.  The patient also with ongoing hematuria.  Hemoglobin progressively dropped.  Status post 2 unit of PRBC transfusion.      Assessment & Plan  Hematuria, unspecified type      BPH with chronic urinary retention Agarwal dependent presenting with hematuria  -Three-way catheter is in place patient continues on CBI  - Urology were consulted for gross hematuria and clot retention.  Currently continue with bladder irrigation continue with three-way catheter.  Also manual irrigation as needed plans for elective cystoscopy as outpatient scheduled on the 28th.     Complicated urinary tract infection, CAUTI-POA  -Continue with ceftriaxone at this time   -Follow-up urine cultures  -Note the patient was treated possibly with Cipro prior to arrival     Acute blood loss anemia  Hemoglobin progressively dropped to 7.4 with dizziness.  The patient has received 2 units of PRBC.    monitor H&H and transfuse to maintain hemoglobin above 7.     Hypertension  Hold off home antihypertensive in setting of acute blood loss anemia     Hyperlipidemia  Continue patient's home statin           Sandra Lopez MD

## 2025-07-25 NOTE — PROGRESS NOTES
Patient continues with 3-way agarwal to CBI. He is requiring blood transfusion today. Discussed with Dr Lopez, he does not anticipate discharging patient this weekend. Urology is planning for a cystoscopy next week, which will likely need to be completed inpatient due to continued hematuria. TCC following.

## 2025-07-26 PROBLEM — R31.0 GROSS HEMATURIA: Status: ACTIVE | Noted: 2025-07-23

## 2025-07-26 LAB
ANION GAP SERPL CALC-SCNC: 8 MMOL/L (ref 10–20)
BASOPHILS # BLD MANUAL: 0 X10*3/UL (ref 0–0.1)
BASOPHILS NFR BLD MANUAL: 0 %
BUN SERPL-MCNC: 29 MG/DL (ref 6–23)
CALCIUM SERPL-MCNC: 8.1 MG/DL (ref 8.6–10.3)
CHLORIDE SERPL-SCNC: 106 MMOL/L (ref 98–107)
CO2 SERPL-SCNC: 27 MMOL/L (ref 21–32)
CREAT SERPL-MCNC: 0.9 MG/DL (ref 0.5–1.3)
EGFRCR SERPLBLD CKD-EPI 2021: 85 ML/MIN/1.73M*2
EOSINOPHIL # BLD MANUAL: 0.79 X10*3/UL (ref 0–0.4)
EOSINOPHIL NFR BLD MANUAL: 6 %
ERYTHROCYTE [DISTWIDTH] IN BLOOD BY AUTOMATED COUNT: 13.7 % (ref 11.5–14.5)
GLUCOSE SERPL-MCNC: 97 MG/DL (ref 74–99)
HCT VFR BLD AUTO: 23.9 % (ref 41–52)
HGB BLD-MCNC: 8 G/DL (ref 13.5–17.5)
IMM GRANULOCYTES # BLD AUTO: 2.24 X10*3/UL (ref 0–0.5)
IMM GRANULOCYTES NFR BLD AUTO: 17 % (ref 0–0.9)
LYMPHOCYTES # BLD MANUAL: 1.72 X10*3/UL (ref 0.8–3)
LYMPHOCYTES NFR BLD MANUAL: 13 %
MCH RBC QN AUTO: 30.2 PG (ref 26–34)
MCHC RBC AUTO-ENTMCNC: 33.5 G/DL (ref 32–36)
MCV RBC AUTO: 90 FL (ref 80–100)
METAMYELOCYTES # BLD MANUAL: 0.92 X10*3/UL
METAMYELOCYTES NFR BLD MANUAL: 7 %
MONOCYTES # BLD MANUAL: 0.4 X10*3/UL (ref 0.05–0.8)
MONOCYTES NFR BLD MANUAL: 3 %
MYELOCYTES # BLD MANUAL: 0.13 X10*3/UL
MYELOCYTES NFR BLD MANUAL: 1 %
NEUTROPHILS # BLD MANUAL: 9.11 X10*3/UL (ref 1.6–5.5)
NEUTS BAND # BLD MANUAL: 0.4 X10*3/UL (ref 0–0.5)
NEUTS BAND NFR BLD MANUAL: 3 %
NEUTS SEG # BLD MANUAL: 8.71 X10*3/UL (ref 1.6–5)
NEUTS SEG NFR BLD MANUAL: 66 %
NRBC BLD-RTO: 0.4 /100 WBCS (ref 0–0)
PLATELET # BLD AUTO: 263 X10*3/UL (ref 150–450)
POTASSIUM SERPL-SCNC: 4.1 MMOL/L (ref 3.5–5.3)
RBC # BLD AUTO: 2.65 X10*6/UL (ref 4.5–5.9)
RBC MORPH BLD: ABNORMAL
SODIUM SERPL-SCNC: 137 MMOL/L (ref 136–145)
TOTAL CELLS COUNTED BLD: 100
VARIANT LYMPHS # BLD MANUAL: 0.13 X10*3/UL (ref 0–0.3)
VARIANT LYMPHS NFR BLD: 1 %
WBC # BLD AUTO: 13.2 X10*3/UL (ref 4.4–11.3)

## 2025-07-26 PROCEDURE — 2500000002 HC RX 250 W HCPCS SELF ADMINISTERED DRUGS (ALT 637 FOR MEDICARE OP, ALT 636 FOR OP/ED)

## 2025-07-26 PROCEDURE — 2500000005 HC RX 250 GENERAL PHARMACY W/O HCPCS: Performed by: STUDENT IN AN ORGANIZED HEALTH CARE EDUCATION/TRAINING PROGRAM

## 2025-07-26 PROCEDURE — 85007 BL SMEAR W/DIFF WBC COUNT: CPT | Performed by: INTERNAL MEDICINE

## 2025-07-26 PROCEDURE — 99232 SBSQ HOSP IP/OBS MODERATE 35: CPT | Performed by: UROLOGY

## 2025-07-26 PROCEDURE — 36415 COLL VENOUS BLD VENIPUNCTURE: CPT | Performed by: INTERNAL MEDICINE

## 2025-07-26 PROCEDURE — 1200000002 HC GENERAL ROOM WITH TELEMETRY DAILY

## 2025-07-26 PROCEDURE — 85027 COMPLETE CBC AUTOMATED: CPT | Performed by: INTERNAL MEDICINE

## 2025-07-26 PROCEDURE — 2500000002 HC RX 250 W HCPCS SELF ADMINISTERED DRUGS (ALT 637 FOR MEDICARE OP, ALT 636 FOR OP/ED): Performed by: NURSE PRACTITIONER

## 2025-07-26 PROCEDURE — 99232 SBSQ HOSP IP/OBS MODERATE 35: CPT | Performed by: INTERNAL MEDICINE

## 2025-07-26 PROCEDURE — 2500000004 HC RX 250 GENERAL PHARMACY W/ HCPCS (ALT 636 FOR OP/ED): Performed by: NURSE PRACTITIONER

## 2025-07-26 PROCEDURE — 2500000001 HC RX 250 WO HCPCS SELF ADMINISTERED DRUGS (ALT 637 FOR MEDICARE OP): Performed by: NURSE PRACTITIONER

## 2025-07-26 PROCEDURE — 80048 BASIC METABOLIC PNL TOTAL CA: CPT | Performed by: INTERNAL MEDICINE

## 2025-07-26 RX ORDER — OXYBUTYNIN CHLORIDE 5 MG/1
5 TABLET, EXTENDED RELEASE ORAL 2 TIMES DAILY
Status: DISCONTINUED | OUTPATIENT
Start: 2025-07-26 | End: 2025-07-26 | Stop reason: RX

## 2025-07-26 RX ORDER — OXYBUTYNIN CHLORIDE 5 MG/1
5 TABLET ORAL 2 TIMES DAILY
Status: DISCONTINUED | OUTPATIENT
Start: 2025-07-26 | End: 2025-07-30 | Stop reason: HOSPADM

## 2025-07-26 RX ADMIN — GABAPENTIN 300 MG: 300 CAPSULE ORAL at 06:04

## 2025-07-26 RX ADMIN — GABAPENTIN 300 MG: 300 CAPSULE ORAL at 12:59

## 2025-07-26 RX ADMIN — SODIUM CHLORIDE 3000 ML: 900 IRRIGANT IRRIGATION at 02:03

## 2025-07-26 RX ADMIN — SIMVASTATIN 20 MG: 20 TABLET, FILM COATED ORAL at 22:19

## 2025-07-26 RX ADMIN — SODIUM CHLORIDE 3000 ML: 900 IRRIGANT IRRIGATION at 10:05

## 2025-07-26 RX ADMIN — CEFTRIAXONE 1 G: 1 INJECTION, SOLUTION INTRAVENOUS at 11:48

## 2025-07-26 RX ADMIN — OXYBUTYNIN CHLORIDE 5 MG: 5 TABLET ORAL at 14:22

## 2025-07-26 RX ADMIN — GABAPENTIN 300 MG: 300 CAPSULE ORAL at 17:52

## 2025-07-26 RX ADMIN — OXYBUTYNIN CHLORIDE 5 MG: 5 TABLET ORAL at 22:19

## 2025-07-26 RX ADMIN — TAMSULOSIN HYDROCHLORIDE 0.4 MG: 0.4 CAPSULE ORAL at 08:47

## 2025-07-26 RX ADMIN — GABAPENTIN 300 MG: 300 CAPSULE ORAL at 22:19

## 2025-07-26 RX ADMIN — PANTOPRAZOLE SODIUM 40 MG: 40 TABLET, DELAYED RELEASE ORAL at 06:04

## 2025-07-26 ASSESSMENT — COGNITIVE AND FUNCTIONAL STATUS - GENERAL
EATING MEALS: A LITTLE
MOBILITY SCORE: 20
WALKING IN HOSPITAL ROOM: A LITTLE
STANDING UP FROM CHAIR USING ARMS: A LITTLE
PERSONAL GROOMING: A LITTLE
DRESSING REGULAR LOWER BODY CLOTHING: A LITTLE
WALKING IN HOSPITAL ROOM: A LITTLE
MOVING TO AND FROM BED TO CHAIR: A LITTLE
MOVING TO AND FROM BED TO CHAIR: A LITTLE
MOBILITY SCORE: 20
DRESSING REGULAR LOWER BODY CLOTHING: A LITTLE
DAILY ACTIVITIY SCORE: 19
EATING MEALS: A LITTLE
HELP NEEDED FOR BATHING: A LITTLE
TOILETING: A LITTLE
HELP NEEDED FOR BATHING: A LITTLE
STANDING UP FROM CHAIR USING ARMS: A LITTLE
CLIMB 3 TO 5 STEPS WITH RAILING: A LITTLE
PERSONAL GROOMING: A LITTLE
CLIMB 3 TO 5 STEPS WITH RAILING: A LITTLE
DAILY ACTIVITIY SCORE: 19
TOILETING: A LITTLE

## 2025-07-26 ASSESSMENT — PAIN SCALES - GENERAL
PAINLEVEL_OUTOF10: 0 - NO PAIN
PAINLEVEL_OUTOF10: 0 - NO PAIN

## 2025-07-26 ASSESSMENT — PAIN - FUNCTIONAL ASSESSMENT
PAIN_FUNCTIONAL_ASSESSMENT: 0-10
PAIN_FUNCTIONAL_ASSESSMENT: 0-10

## 2025-07-26 NOTE — PROGRESS NOTES
"Benjamin Mae is a 80 y.o. male on day 3 of admission presenting with Hematuria, unspecified type.    Subjective   Intermittent suprapubic discomfort/pain       Objective     Physical Exam  CBI running, some blood clots requiring manual irrigation.    Tmax 99, T-current 97.2, hemoglobin 8.5    Last Recorded Vitals  Blood pressure 124/71, pulse 62, temperature 36.2 °C (97.2 °F), temperature source Temporal, resp. rate 17, height 1.753 m (5' 9\"), weight 77.1 kg (170 lb), SpO2 97%.  Intake/Output last 3 Shifts:  I/O last 3 completed shifts:  In: 2415.4 (31.3 mL/kg) [P.O.:1750; Blood:665.4]  Out: 0 (0 mL/kg)   Weight: 77.1 kg     Relevant Results               This patient has a urinary catheter   Reason for the urinary catheter remaining today? urinary retention/bladder outlet obstruction, acute or chronic               Assessment & Plan  Hematuria, unspecified type    Impression  Gross hematuria  Clot retention  Bladder spasm    Plan  Continue CBI  Manual irrigation as needed  Monitor H&H  Transfusion as needed  Oxybutynin 5 mg twice a day  OR Tuesday for cystoscopy possible biopsy possible resection of bladder tumor  Discussed with the patient and the family      I spent 25 minutes in the professional and overall care of this patient.      Kumar Enriquez MD    "

## 2025-07-26 NOTE — CARE PLAN
The patient's goals for the shift include      The clinical goals for the shift include Patient safety and HDS throughout the shift.    Problem: Safety - Adult  Goal: Free from fall injury  Outcome: Progressing

## 2025-07-26 NOTE — PROGRESS NOTES
"Occupational Therapy                 Therapy Communication Note    Patient Name: Benjamin Mae  MRN: [id number]  Department: [facility]  Room: [address] [place], [place] [address]  Today's Date: 7/26/2025     Discipline: Occupational Therapy    Missed Visit: OT Missed Visit: Yes     Missed Visit Reason: Missed Visit Reason: Patient refused (\" I got up today with my nurse thats all Im going to do. \" unable to pursuade pt. to get up out of bed. he adamantly declined)    Missed Time: Attempt    Comment:      "

## 2025-07-26 NOTE — NURSING NOTE
Patient had a bout this shift whereby his agarwal suddenly without warning stopped draining after draining without any issues. The patient was flushed with no draw back twice. He was repositioned several ways and sat up- no drainage. PA was notified and in the interim the PA was also notified that we were going to try to flush another time. This was achieved as were all the other attempts with ease- no positive result. Finally the agarwal flushport itself was disconnected from the agarwal and we successfully had full drainage. We allowed this to drain fully and reconnected the fully cleaned tubing . No other issues have been noted up to this point with drainage. Patient's agarwal continues to drain dark pin to red fluid as CBI continues. Patient's bed remains in the low locked position with call light in hand.

## 2025-07-26 NOTE — CARE PLAN
The patient's goals for the shift include  to have more clear urine.     The clinical goals for the shift include Patient urine will run clear to pink by end of shift. Patient will remain free from falls and injury during shift.    Problem: Pain - Adult  Goal: Verbalizes/displays adequate comfort level or baseline comfort level  7/26/2025 1117 by Shruthi Turpin RN  Outcome: Progressing  7/26/2025 1115 by Shruthi Turpin RN  Outcome: Progressing  Flowsheets (Taken 7/26/2025 1115)  Verbalizes/displays adequate comfort level or baseline comfort level:   Encourage patient to monitor pain and request assistance   Assess pain using appropriate pain scale     Problem: Safety - Adult  Goal: Free from fall injury  7/26/2025 1117 by Shruthi Turpin RN  Outcome: Progressing  7/26/2025 1115 by Shruthi Turpin RN  Outcome: Progressing     Problem: Discharge Planning  Goal: Discharge to home or other facility with appropriate resources  7/26/2025 1117 by Shruthi Turpin RN  Outcome: Progressing  Flowsheets (Taken 7/26/2025 1117)  Discharge to home or other facility with appropriate resources:   Identify barriers to discharge with patient and caregiver   Arrange for needed discharge resources and transportation as appropriate  7/26/2025 1115 by Shruthi Turpin RN  Outcome: Progressing  Flowsheets (Taken 7/26/2025 1115)  Discharge to home or other facility with appropriate resources:   Identify barriers to discharge with patient and caregiver   Arrange for needed discharge resources and transportation as appropriate     Problem: Chronic Conditions and Co-morbidities  Goal: Patient's chronic conditions and co-morbidity symptoms are monitored and maintained or improved  7/26/2025 1117 by Shruthi Turpin RN  Outcome: Progressing  Flowsheets (Taken 7/26/2025 1117)  Care Plan - Patient's Chronic Conditions and Co-Morbidity Symptoms are Monitored and Maintained or  Improved:   Monitor and assess patient's chronic conditions and comorbid symptoms for stability, deterioration, or improvement   Collaborate with multidisciplinary team to address chronic and comorbid conditions and prevent exacerbation or deterioration  7/26/2025 1115 by Shruthi Turpin RN  Outcome: Progressing  Flowsheets (Taken 7/26/2025 1115)  Care Plan - Patient's Chronic Conditions and Co-Morbidity Symptoms are Monitored and Maintained or Improved:   Monitor and assess patient's chronic conditions and comorbid symptoms for stability, deterioration, or improvement   Collaborate with multidisciplinary team to address chronic and comorbid conditions and prevent exacerbation or deterioration     Problem: Nutrition  Goal: Nutrient intake appropriate for maintaining nutritional needs  7/26/2025 1117 by Shruthi Turpin RN  Outcome: Progressing  7/26/2025 1115 by Shruthi Turpin RN  Outcome: Progressing     Problem: Musculoskeletal - Adult  Goal: Return ADL status to a safe level of function  Outcome: Progressing  Flowsheets (Taken 7/26/2025 1117)  Return ADL status to a safe level of function: Administer medication as ordered     Problem: Infection - Adult  Goal: Absence of infection during hospitalization  Outcome: Progressing

## 2025-07-26 NOTE — NURSING NOTE
End of Shift:  Patient cooperative and pleasant. No complaints during shift. Patient was irrigated once during shift and had quite a few clots evacuated. Patient has CBI running at a moderate/slow rate. Urine was deep red at start of shift; has changed to a more light koolaid color at end of shift.

## 2025-07-26 NOTE — PROGRESS NOTES
Benjamin Mae is a 80 y.o. male on day 3 of admission presenting with Hematuria, unspecified type.      Subjective      Benjamin Mae is a 80 y.o. 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  Patient had been seen in the emergency department over the weekend with complaint of hematuria.  A three-way catheter has been inserted patient was irrigated patient wanted to go home and follow-up with his urologist on Monday.  The patient had been discharged to home and returned again over the weekend to the emergency department with similar issues the Agarwal catheter was not draining and the patient could not get it to improve.  Had additional irrigation performed and followed up with urology on Monday.  His catheter was evaluated by urology and irrigated clots were removed patient was placed on Cipro.  This morning the patient returns to the hospital again with abdominal pain and his Agarwal catheter not draining.  Patient was seen Agarwal catheter irrigated set up with CBI patient now having ongoing hematuria.  He does complain of some chills as well as weakness unable to get up off the couch.  No severe abdominal pain after having received some pain medicine prior to arrival.  Case was discussed with the ED provider plan is to admit patient continue treatment with IV antibiotics obtain urology consultation patient is also continued on CBI.     ED Course:   Vitals on presentation: T 36.3 °C (97.3 °F)  HR (!) 116  /78  RR (!) 24  O2 98 % None (Room air)  Labs:   CBC with WBC 20.6, Hgb 9.6, Plts 315.   CMP with glucose 142, Na 135, K 3.9, BUN 53, sCr 1.46, alk phos 83, ALT 16, AST 14, bilirubin 0.6. Magnesium No results found for requested labs within last 365 days..   BNP No results found for requested labs within last 365 days.. Trop No results found for requested labs within last 365 days..   Lactate No  results found for requested labs within last 365 days.  EKG: Not performed  Imaging - agree with radiology interpretation(s):   Interventions: CBI, Rocephin    7/26/2025: Bladder irrigation continued for hematuria with possible clots in bladder. IV Rocephin continue for complicated UTI.    Objective     Last Recorded Vitals  /82 (BP Location: Right arm, Patient Position: Sitting)   Pulse 68   Temp 36.4 °C (97.6 °F) (Temporal)   Resp 16   Wt 77.1 kg (170 lb)   SpO2 98%   Intake/Output last 3 Shifts:    Intake/Output Summary (Last 24 hours) at 7/26/2025 1342  Last data filed at 7/26/2025 1333  Gross per 24 hour   Intake 2150 ml   Output 52395 ml   Net -99350 ml       Admission Weight  Weight: 77.1 kg (170 lb) (07/23/25 1025)    Daily Weight  07/23/25 : 77.1 kg (170 lb)    Image Results  Electrocardiogram 12 Lead  Sinus rhythm  Left anterior fascicular block  Abnormal R-wave progression, early transition  Minimal ST elevation, anterolateral leads    Confirmed by ALMA ZELAYA MD ([id number]) on 12/20/2019 10:40:56 AM      Physical Exam  Constitutional:       General: He is not in acute distress.     Appearance: Normal appearance.   HENT:      Head: Normocephalic.      Mouth/Throat:      Mouth: Mucous membranes are moist.      Pharynx: Oropharynx is clear.     Eyes:      Pupils: Pupils are equal, round, and reactive to light.       Cardiovascular:      Rate and Rhythm: Normal rate and regular rhythm.      Heart sounds: Normal heart sounds. No murmur heard.     No gallop.   Pulmonary:      Effort: Pulmonary effort is normal.      Breath sounds: Normal breath sounds.   Abdominal:      General: Bowel sounds are normal. There is no distension.      Palpations: Abdomen is soft.      Tenderness: There is no abdominal tenderness.   Genitourinary:     Comments: [place] in place    Musculoskeletal:         General: No swelling. Normal range of motion.      Cervical back: Neck supple. No rigidity.     Skin:     General: Skin is  warm and dry.     Neurological:      General: No focal deficit present.      Mental Status: He is alert.      Cranial Nerves: No cranial nerve deficit.      Sensory: No sensory deficit.     Psychiatric:         Mood and Affect: Mood normal.         Behavior: Behavior normal.         Relevant Results           Results for orders placed or performed during the hospital encounter of 07/23/25 (from the past 96 hours)   CBC and Auto Differential   Result Value Ref Range    WBC 20.6 (H) 4.4 - 11.3 x10*3/uL    nRBC 0.0 0.0 - 0.0 /100 WBCs    RBC 3.09 (L) 4.50 - 5.90 x10*6/uL    Hemoglobin 9.6 (L) 13.5 - 17.5 g/dL    Hematocrit 27.5 (L) 41.0 - 52.0 %    MCV 89 80 - 100 fL    MCH 31.1 26.0 - 34.0 pg    MCHC 34.9 32.0 - 36.0 g/dL    RDW 13.2 11.5 - 14.5 %    Platelets 315 150 - 450 x10*3/uL    Neutrophils % 79.8 40.0 - 80.0 %    Immature Granulocytes %, Automated 3.9 (H) 0.0 - 0.9 %    Lymphocytes % 4.2 13.0 - 44.0 %    Monocytes % 11.7 2.0 - 10.0 %    Eosinophils % 0.0 0.0 - 6.0 %    Basophils % 0.4 0.0 - 2.0 %    Neutrophils Absolute 16.47 (H) 1.60 - 5.50 x10*3/uL    Immature Granulocytes Absolute, Automated 0.81 (H) 0.00 - 0.50 x10*3/uL    Lymphocytes Absolute 0.86 0.80 - 3.00 x10*3/uL    Monocytes Absolute 2.41 (H) 0.05 - 0.80 x10*3/uL    Eosinophils Absolute 0.00 0.00 - 0.40 x10*3/uL    Basophils Absolute 0.08 0.00 - 0.10 x10*3/uL   Comprehensive metabolic panel   Result Value Ref Range    Glucose 142 (H) 74 - 99 mg/dL    Sodium 135 (L) 136 - 145 mmol/L    Potassium 3.9 3.5 - 5.3 mmol/L    Chloride 103 98 - 107 mmol/L    Bicarbonate 21 21 - 32 mmol/L    Anion Gap 15 10 - 20 mmol/L    Urea Nitrogen 53 (H) 6 - 23 mg/dL    Creatinine 1.46 (H) 0.50 - 1.30 mg/dL    eGFR 48 (L) >60 mL/min/1.73m*2    Calcium 8.8 8.6 - 10.3 mg/dL    Albumin 3.5 3.4 - 5.0 g/dL    Alkaline Phosphatase 83 33 - 136 U/L    Total Protein 6.7 6.4 - 8.2 g/dL    AST 14 9 - 39 U/L    Bilirubin, Total 0.6 0.0 - 1.2 mg/dL    ALT 16 10 - 52 U/L   Protime-INR    Result Value Ref Range    Protime 13.9 (H) 9.8 - 12.4 seconds    INR 1.3 (H) 0.9 - 1.1   aPTT   Result Value Ref Range    aPTT 30 26 - 36 seconds   Morphology   Result Value Ref Range    RBC Morphology See Below     Polychromasia Mild     Hypochromia Mild     Ovalocytes Few     Liat Cells Few     Basophilic Stippling Present    Iron and TIBC   Result Value Ref Range    Iron 39 35 - 150 ug/dL    UIBC 182 110 - 370 ug/dL    TIBC 221 (L) 240 - 445 ug/dL    % Saturation 18 (L) 25 - 45 %   Ferritin   Result Value Ref Range    Ferritin 174 20 - 300 ng/mL   Urinalysis with Reflex Culture and Microscopic   Result Value Ref Range    Color, Urine Red (N) Straw, Yellow    Appearance, Urine Turbid (N) Clear    Specific Gravity, Urine >1.030 (N) 1.005 - 1.035    pH, Urine 6.5 5.0, 5.5, 6.0, 6.5, 7.0, 7.5, 8.0    Protein, Urine 100 (2+) (A) NEGATIVE, 10 (TRACE) mg/dL    Glucose, Urine NEGATIVE NEGATIVE mg/dL    Blood, Urine 1.0 (3+) (A) NEGATIVE mg/dL    Ketones, Urine NEGATIVE NEGATIVE mg/dL    Bilirubin, Urine NEGATIVE NEGATIVE mg/dL    Urobilinogen, Urine 2 (1+) (N) NORM mg/dL    Nitrite, Urine NEGATIVE NEGATIVE    Leukocyte Esterase, Urine 250 Wilman/uL (A) NEGATIVE   Extra Urine Gray Tube   Result Value Ref Range    Extra Tube     Microscopic Only, Urine   Result Value Ref Range    WBC, Urine 21-50 (A) 1-5, NONE /HPF    RBC, Urine >20 (A) NONE, 1-2, 3-5 /HPF   Urine Culture    Specimen: Indwelling ([place]) Catheter; Urine   Result Value Ref Range    Urine Culture No growth    Blood Culture    Specimen: Peripheral Venipuncture; Blood culture   Result Value Ref Range    Blood Culture No growth at 2 days    Blood Culture    Specimen: Peripheral Venipuncture; Blood culture   Result Value Ref Range    Blood Culture No growth at 2 days    Lactate   Result Value Ref Range    Lactate 1.3 0.4 - 2.0 mmol/L   Type and screen   Result Value Ref Range    ABO TYPE O     Rh TYPE POS     ANTIBODY SCREEN NEG    Hemoglobin and Hematocrit, Blood    Result Value Ref Range    Hemoglobin 8.2 (L) 13.5 - 17.5 g/dL    Hematocrit 23.7 (L) 41.0 - 52.0 %   CBC   Result Value Ref Range    WBC 12.3 (H) 4.4 - 11.3 x10*3/uL    nRBC 0.0 0.0 - 0.0 /100 WBCs    RBC 2.47 (L) 4.50 - 5.90 x10*6/uL    Hemoglobin 7.4 (L) 13.5 - 17.5 g/dL    Hematocrit 21.9 (L) 41.0 - 52.0 %    MCV 89 80 - 100 fL    MCH 30.0 26.0 - 34.0 pg    MCHC 33.8 32.0 - 36.0 g/dL    RDW 13.1 11.5 - 14.5 %    Platelets 249 150 - 450 x10*3/uL   Basic metabolic panel   Result Value Ref Range    Glucose 112 (H) 74 - 99 mg/dL    Sodium 136 136 - 145 mmol/L    Potassium 3.6 3.5 - 5.3 mmol/L    Chloride 104 98 - 107 mmol/L    Bicarbonate 24 21 - 32 mmol/L    Anion Gap 12 10 - 20 mmol/L    Urea Nitrogen 55 (H) 6 - 23 mg/dL    Creatinine 1.45 (H) 0.50 - 1.30 mg/dL    eGFR 48 (L) >60 mL/min/1.73m*2    Calcium 8.2 (L) 8.6 - 10.3 mg/dL   Prepare RBC: 2 Units   Result Value Ref Range    PRODUCT CODE M3735J73     Unit Number [id number]     Unit ABO O     Unit RH POS     XM INTEP COMP     Dispense Status TR     Blood Expiration Date 8/3/2025 11:59:00 PM EDT     PRODUCT BLOOD TYPE 5100     UNIT VOLUME 350     PRODUCT CODE [id number]     Unit Number [id number]     Unit ABO O     Unit RH POS     XM INTEP COMP     Dispense Status TR     Blood Expiration Date 8/20/2025 11:59:00 PM EDT     PRODUCT BLOOD TYPE 5100     UNIT VOLUME 350    VERIFY ABO/Rh Group Test   Result Value Ref Range    ABO TYPE O     Rh TYPE POS    CBC and Auto Differential   Result Value Ref Range    WBC 11.6 (H) 4.4 - 11.3 x10*3/uL    nRBC 0.5 (H) 0.0 - 0.0 /100 WBCs    RBC 2.51 (L) 4.50 - 5.90 x10*6/uL    Hemoglobin 7.5 (L) 13.5 - 17.5 g/dL    Hematocrit 22.5 (L) 41.0 - 52.0 %    MCV 90 80 - 100 fL    MCH 29.9 26.0 - 34.0 pg    MCHC 33.3 32.0 - 36.0 g/dL    RDW 13.7 11.5 - 14.5 %    Platelets 258 150 - 450 x10*3/uL    Immature Granulocytes %, Automated 12.8 (H) 0.0 - 0.9 %    Immature Granulocytes Absolute, Automated 1.48 (H) 0.00 - 0.50 x10*3/uL    Basic Metabolic Panel   Result Value Ref Range    Glucose 98 74 - 99 mg/dL    Sodium 138 136 - 145 mmol/L    Potassium 3.8 3.5 - 5.3 mmol/L    Chloride 108 (H) 98 - 107 mmol/L    Bicarbonate 25 21 - 32 mmol/L    Anion Gap 9 (L) 10 - 20 mmol/L    Urea Nitrogen 39 (H) 6 - 23 mg/dL    Creatinine 1.11 0.50 - 1.30 mg/dL    eGFR 66 >60 mL/min/1.73m*2    Calcium 8.5 (L) 8.6 - 10.3 mg/dL   Manual Differential   Result Value Ref Range    Neutrophils %, Manual 58.0 40.0 - 80.0 %    Bands %, Manual 1.0 0.0 - 5.0 %    Lymphocytes %, Manual 12.0 13.0 - 44.0 %    Monocytes %, Manual 10.0 2.0 - 10.0 %    Eosinophils %, Manual 7.0 0.0 - 6.0 %    Basophils %, Manual 0.0 0.0 - 2.0 %    Metamyelocytes %, Manual 10.0 0.0 - 0.0 %    Myelocytes %, Manual 2.0 0.0 - 0.0 %    Seg Neutrophils Absolute, Manual 6.73 (H) 1.60 - 5.00 x10*3/uL    Bands Absolute, Manual 0.12 0.00 - 0.50 x10*3/uL    Lymphocytes Absolute, Manual 1.39 0.80 - 3.00 x10*3/uL    Monocytes Absolute, Manual 1.16 (H) 0.05 - 0.80 x10*3/uL    Eosinophils Absolute, Manual 0.81 (H) 0.00 - 0.40 x10*3/uL    Basophils Absolute, Manual 0.00 0.00 - 0.10 x10*3/uL    Metamyelocytes Absolute, Manual 1.16 0.00 - 0.00 x10*3/uL    Myelocytes Absolute, Manual 0.23 0.00 - 0.00 x10*3/uL    Total Cells Counted 100     Neutrophils Absolute, Manual 6.85 (H) 1.60 - 5.50 x10*3/uL    RBC Morphology See Below     Hypochromia Mild    CBC and Auto Differential   Result Value Ref Range    WBC 12.8 (H) 4.4 - 11.3 x10*3/uL    nRBC 0.6 (H) 0.0 - 0.0 /100 WBCs    RBC 2.96 (L) 4.50 - 5.90 x10*6/uL    Hemoglobin 9.1 (L) 13.5 - 17.5 g/dL    Hematocrit 26.4 (L) 41.0 - 52.0 %    MCV 89 80 - 100 fL    MCH 30.7 26.0 - 34.0 pg    MCHC 34.5 32.0 - 36.0 g/dL    RDW 13.9 11.5 - 14.5 %    Platelets 250 150 - 450 x10*3/uL    Immature Granulocytes %, Automated 13.9 (H) 0.0 - 0.9 %    Immature Granulocytes Absolute, Automated 1.77 (H) 0.00 - 0.50 x10*3/uL   Manual Differential   Result Value Ref Range    Neutrophils  %, Manual 66.0 40.0 - 80.0 %    Bands %, Manual 4.0 0.0 - 5.0 %    Lymphocytes %, Manual 14.0 13.0 - 44.0 %    Monocytes %, Manual 7.0 2.0 - 10.0 %    Eosinophils %, Manual 4.0 0.0 - 6.0 %    Basophils %, Manual 0.0 0.0 - 2.0 %    Metamyelocytes %, Manual 5.0 0.0 - 0.0 %    Seg Neutrophils Absolute, Manual 8.45 (H) 1.60 - 5.00 x10*3/uL    Bands Absolute, Manual 0.51 (H) 0.00 - 0.50 x10*3/uL    Lymphocytes Absolute, Manual 1.79 0.80 - 3.00 x10*3/uL    Monocytes Absolute, Manual 0.90 (H) 0.05 - 0.80 x10*3/uL    Eosinophils Absolute, Manual 0.51 (H) 0.00 - 0.40 x10*3/uL    Basophils Absolute, Manual 0.00 0.00 - 0.10 x10*3/uL    Metamyelocytes Absolute, Manual 0.64 0.00 - 0.00 x10*3/uL    Total Cells Counted 100     Neutrophils Absolute, Manual 8.96 (H) 1.60 - 5.50 x10*3/uL    RBC Morphology See Below     Polychromasia Mild    CBC and Auto Differential   Result Value Ref Range    WBC 13.2 (H) 4.4 - 11.3 x10*3/uL    nRBC 0.4 (H) 0.0 - 0.0 /100 WBCs    RBC 2.65 (L) 4.50 - 5.90 x10*6/uL    Hemoglobin 8.0 (L) 13.5 - 17.5 g/dL    Hematocrit 23.9 (L) 41.0 - 52.0 %    MCV 90 80 - 100 fL    MCH 30.2 26.0 - 34.0 pg    MCHC 33.5 32.0 - 36.0 g/dL    RDW 13.7 11.5 - 14.5 %    Platelets 263 150 - 450 x10*3/uL    Immature Granulocytes %, Automated 17.0 (H) 0.0 - 0.9 %    Immature Granulocytes Absolute, Automated 2.24 (H) 0.00 - 0.50 x10*3/uL   Basic Metabolic Panel   Result Value Ref Range    Glucose 97 74 - 99 mg/dL    Sodium 137 136 - 145 mmol/L    Potassium 4.1 3.5 - 5.3 mmol/L    Chloride 106 98 - 107 mmol/L    Bicarbonate 27 21 - 32 mmol/L    Anion Gap 8 (L) 10 - 20 mmol/L    Urea Nitrogen 29 (H) 6 - 23 mg/dL    Creatinine 0.90 0.50 - 1.30 mg/dL    eGFR 85 >60 mL/min/1.73m*2    Calcium 8.1 (L) 8.6 - 10.3 mg/dL   Manual Differential   Result Value Ref Range    Neutrophils %, Manual 66.0 40.0 - 80.0 %    Bands %, Manual 3.0 0.0 - 5.0 %    Lymphocytes %, Manual 13.0 13.0 - 44.0 %    Monocytes %, Manual 3.0 2.0 - 10.0 %     Eosinophils %, Manual 6.0 0.0 - 6.0 %    Basophils %, Manual 0.0 0.0 - 2.0 %    Atypical Lymphocytes %, Manual 1.0 0.0 - 2.0 %    Metamyelocytes %, Manual 7.0 0.0 - 0.0 %    Myelocytes %, Manual 1.0 0.0 - 0.0 %    Seg Neutrophils Absolute, Manual 8.71 (H) 1.60 - 5.00 x10*3/uL    Bands Absolute, Manual 0.40 0.00 - 0.50 x10*3/uL    Lymphocytes Absolute, Manual 1.72 0.80 - 3.00 x10*3/uL    Monocytes Absolute, Manual 0.40 0.05 - 0.80 x10*3/uL    Eosinophils Absolute, Manual 0.79 (H) 0.00 - 0.40 x10*3/uL    Basophils Absolute, Manual 0.00 0.00 - 0.10 x10*3/uL    Atypical Lymphs Absolute, Manual 0.13 0.00 - 0.30 x10*3/uL    Metamyelocytes Absolute, Manual 0.92 0.00 - 0.00 x10*3/uL    Myelocytes Absolute, Manual 0.13 0.00 - 0.00 x10*3/uL    Total Cells Counted 100     Neutrophils Absolute, Manual 9.11 (H) 1.60 - 5.50 x10*3/uL    RBC Morphology No significant RBC morphology present                       Assessment & Plan  Hematuria, unspecified type    Gross hematuria      1. Hematuria, unspecified type      bladder irrigation per Urology, monitor H/H      2. Urinary tract infection associated with indwelling urethral catheter, subsequent encounter      on IV Rocephin      3. Benign prostatic hyperplasia with urinary obstruction and other lower urinary tract symptoms        4. Anemia, unspecified type      monitor H/H      5. Sepsis, due to unspecified organism, unspecified whether acute organ dysfunction present (Multi)        6. Gross hematuria  Case Request Operating Room: CYSTOSCOPY, WITH LESION EXCISION    Case Request Operating Room: CYSTOSCOPY, WITH LESION EXCISION                   Leidy Coyle MD

## 2025-07-27 VITALS
SYSTOLIC BLOOD PRESSURE: 106 MMHG | OXYGEN SATURATION: 97 % | HEART RATE: 67 BPM | HEIGHT: 69 IN | TEMPERATURE: 98 F | WEIGHT: 170 LBS | BODY MASS INDEX: 25.18 KG/M2 | DIASTOLIC BLOOD PRESSURE: 52 MMHG | RESPIRATION RATE: 16 BRPM

## 2025-07-27 LAB
ANION GAP SERPL CALC-SCNC: 9 MMOL/L (ref 10–20)
BACTERIA BLD CULT: NORMAL
BACTERIA BLD CULT: NORMAL
BASOPHILS # BLD MANUAL: 0 X10*3/UL (ref 0–0.1)
BASOPHILS NFR BLD MANUAL: 0 %
BUN SERPL-MCNC: 22 MG/DL (ref 6–23)
CALCIUM SERPL-MCNC: 8.2 MG/DL (ref 8.6–10.3)
CHLORIDE SERPL-SCNC: 106 MMOL/L (ref 98–107)
CO2 SERPL-SCNC: 25 MMOL/L (ref 21–32)
CREAT SERPL-MCNC: 0.88 MG/DL (ref 0.5–1.3)
EGFRCR SERPLBLD CKD-EPI 2021: 86 ML/MIN/1.73M*2
EOSINOPHIL # BLD MANUAL: 0.63 X10*3/UL (ref 0–0.4)
EOSINOPHIL NFR BLD MANUAL: 4 %
ERYTHROCYTE [DISTWIDTH] IN BLOOD BY AUTOMATED COUNT: 14.1 % (ref 11.5–14.5)
GLUCOSE SERPL-MCNC: 93 MG/DL (ref 74–99)
HCT VFR BLD AUTO: 25.3 % (ref 41–52)
HGB BLD-MCNC: 8.2 G/DL (ref 13.5–17.5)
IMM GRANULOCYTES # BLD AUTO: 2.74 X10*3/UL (ref 0–0.5)
IMM GRANULOCYTES NFR BLD AUTO: 17.5 % (ref 0–0.9)
LYMPHOCYTES # BLD MANUAL: 2.04 X10*3/UL (ref 0.8–3)
LYMPHOCYTES NFR BLD MANUAL: 13 %
MCH RBC QN AUTO: 30.1 PG (ref 26–34)
MCHC RBC AUTO-ENTMCNC: 32.4 G/DL (ref 32–36)
MCV RBC AUTO: 93 FL (ref 80–100)
METAMYELOCYTES # BLD MANUAL: 0.47 X10*3/UL
METAMYELOCYTES NFR BLD MANUAL: 3 %
MONOCYTES # BLD MANUAL: 0.94 X10*3/UL (ref 0.05–0.8)
MONOCYTES NFR BLD MANUAL: 6 %
MYELOCYTES # BLD MANUAL: 0.47 X10*3/UL
MYELOCYTES NFR BLD MANUAL: 3 %
NEUTROPHILS # BLD MANUAL: 10.99 X10*3/UL (ref 1.6–5.5)
NEUTS BAND # BLD MANUAL: 0.31 X10*3/UL (ref 0–0.5)
NEUTS BAND NFR BLD MANUAL: 2 %
NEUTS SEG # BLD MANUAL: 10.68 X10*3/UL (ref 1.6–5)
NEUTS SEG NFR BLD MANUAL: 68 %
NRBC BLD-RTO: 0.3 /100 WBCS (ref 0–0)
PLATELET # BLD AUTO: 298 X10*3/UL (ref 150–450)
POLYCHROMASIA BLD QL SMEAR: ABNORMAL
POTASSIUM SERPL-SCNC: 4.2 MMOL/L (ref 3.5–5.3)
RBC # BLD AUTO: 2.72 X10*6/UL (ref 4.5–5.9)
RBC MORPH BLD: ABNORMAL
SODIUM SERPL-SCNC: 136 MMOL/L (ref 136–145)
TOTAL CELLS COUNTED BLD: 100
VARIANT LYMPHS # BLD MANUAL: 0.16 X10*3/UL (ref 0–0.3)
VARIANT LYMPHS NFR BLD: 1 %
WBC # BLD AUTO: 15.7 X10*3/UL (ref 4.4–11.3)

## 2025-07-27 PROCEDURE — 85027 COMPLETE CBC AUTOMATED: CPT | Performed by: INTERNAL MEDICINE

## 2025-07-27 PROCEDURE — 82374 ASSAY BLOOD CARBON DIOXIDE: CPT | Performed by: INTERNAL MEDICINE

## 2025-07-27 PROCEDURE — 2500000004 HC RX 250 GENERAL PHARMACY W/ HCPCS (ALT 636 FOR OP/ED): Performed by: NURSE PRACTITIONER

## 2025-07-27 PROCEDURE — 2500000005 HC RX 250 GENERAL PHARMACY W/O HCPCS: Performed by: STUDENT IN AN ORGANIZED HEALTH CARE EDUCATION/TRAINING PROGRAM

## 2025-07-27 PROCEDURE — 2500000002 HC RX 250 W HCPCS SELF ADMINISTERED DRUGS (ALT 637 FOR MEDICARE OP, ALT 636 FOR OP/ED): Performed by: NURSE PRACTITIONER

## 2025-07-27 PROCEDURE — 2500000002 HC RX 250 W HCPCS SELF ADMINISTERED DRUGS (ALT 637 FOR MEDICARE OP, ALT 636 FOR OP/ED)

## 2025-07-27 PROCEDURE — 1210000001 HC SEMI-PRIVATE ROOM DAILY

## 2025-07-27 PROCEDURE — 85007 BL SMEAR W/DIFF WBC COUNT: CPT | Performed by: INTERNAL MEDICINE

## 2025-07-27 PROCEDURE — 99232 SBSQ HOSP IP/OBS MODERATE 35: CPT | Performed by: INTERNAL MEDICINE

## 2025-07-27 PROCEDURE — 36415 COLL VENOUS BLD VENIPUNCTURE: CPT | Performed by: INTERNAL MEDICINE

## 2025-07-27 PROCEDURE — 2500000001 HC RX 250 WO HCPCS SELF ADMINISTERED DRUGS (ALT 637 FOR MEDICARE OP): Performed by: NURSE PRACTITIONER

## 2025-07-27 RX ADMIN — SODIUM CHLORIDE 3000 ML: 900 IRRIGANT IRRIGATION at 03:40

## 2025-07-27 RX ADMIN — GABAPENTIN 300 MG: 300 CAPSULE ORAL at 16:25

## 2025-07-27 RX ADMIN — OXYBUTYNIN CHLORIDE 5 MG: 5 TABLET ORAL at 21:13

## 2025-07-27 RX ADMIN — CEFTRIAXONE 1 G: 1 INJECTION, SOLUTION INTRAVENOUS at 11:04

## 2025-07-27 RX ADMIN — SIMVASTATIN 20 MG: 20 TABLET, FILM COATED ORAL at 21:13

## 2025-07-27 RX ADMIN — OXYBUTYNIN CHLORIDE 5 MG: 5 TABLET ORAL at 09:16

## 2025-07-27 RX ADMIN — GABAPENTIN 300 MG: 300 CAPSULE ORAL at 13:13

## 2025-07-27 RX ADMIN — SODIUM CHLORIDE 3000 ML: 900 IRRIGANT IRRIGATION at 14:21

## 2025-07-27 RX ADMIN — PANTOPRAZOLE SODIUM 40 MG: 40 TABLET, DELAYED RELEASE ORAL at 06:00

## 2025-07-27 RX ADMIN — TAMSULOSIN HYDROCHLORIDE 0.4 MG: 0.4 CAPSULE ORAL at 09:16

## 2025-07-27 RX ADMIN — SODIUM CHLORIDE 3000 ML: 900 IRRIGANT IRRIGATION at 19:12

## 2025-07-27 RX ADMIN — GABAPENTIN 300 MG: 300 CAPSULE ORAL at 06:00

## 2025-07-27 RX ADMIN — SODIUM CHLORIDE 3000 ML: 900 IRRIGANT IRRIGATION at 09:00

## 2025-07-27 RX ADMIN — SODIUM CHLORIDE 3000 ML: 900 IRRIGANT IRRIGATION at 21:14

## 2025-07-27 RX ADMIN — GABAPENTIN 300 MG: 300 CAPSULE ORAL at 21:13

## 2025-07-27 ASSESSMENT — COGNITIVE AND FUNCTIONAL STATUS - GENERAL
CLIMB 3 TO 5 STEPS WITH RAILING: A LITTLE
HELP NEEDED FOR BATHING: A LITTLE
WALKING IN HOSPITAL ROOM: A LITTLE
WALKING IN HOSPITAL ROOM: A LITTLE
MOVING TO AND FROM BED TO CHAIR: A LITTLE
DRESSING REGULAR LOWER BODY CLOTHING: A LITTLE
MOBILITY SCORE: 20
STANDING UP FROM CHAIR USING ARMS: A LITTLE
HELP NEEDED FOR BATHING: A LITTLE
STANDING UP FROM CHAIR USING ARMS: A LITTLE
STANDING UP FROM CHAIR USING ARMS: A LITTLE
CLIMB 3 TO 5 STEPS WITH RAILING: A LITTLE
WALKING IN HOSPITAL ROOM: A LITTLE
MOBILITY SCORE: 21
DRESSING REGULAR LOWER BODY CLOTHING: A LITTLE
CLIMB 3 TO 5 STEPS WITH RAILING: A LITTLE
DAILY ACTIVITIY SCORE: 22

## 2025-07-27 ASSESSMENT — PAIN SCALES - GENERAL
PAINLEVEL_OUTOF10: 0 - NO PAIN

## 2025-07-27 ASSESSMENT — PAIN - FUNCTIONAL ASSESSMENT
PAIN_FUNCTIONAL_ASSESSMENT: 0-10

## 2025-07-27 NOTE — PROGRESS NOTES
Benjamin Mae is a 80 y.o. male on day 4 of admission presenting with Hematuria, unspecified type.      Subjective   Benjamin Mae is a 80 y.o. 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  Patient had been seen in the emergency department over the weekend with complaint of hematuria.  A three-way catheter has been inserted patient was irrigated patient wanted to go home and follow-up with his urologist on Monday.  The patient had been discharged to home and returned again over the weekend to the emergency department with similar issues the Agarwal catheter was not draining and the patient could not get it to improve.  Had additional irrigation performed and followed up with urology on Monday.  His catheter was evaluated by urology and irrigated clots were removed patient was placed on Cipro.  This morning the patient returns to the hospital again with abdominal pain and his Agarwal catheter not draining.  Patient was seen Agarwal catheter irrigated set up with CBI patient now having ongoing hematuria.  He does complain of some chills as well as weakness unable to get up off the couch.  No severe abdominal pain after having received some pain medicine prior to arrival.  Case was discussed with the ED provider plan is to admit patient continue treatment with IV antibiotics obtain urology consultation patient is also continued on CBI.     ED Course:   Vitals on presentation: T 36.3 °C (97.3 °F)  HR (!) 116  /78  RR (!) 24  O2 98 % None (Room air)  Labs:   CBC with WBC 20.6, Hgb 9.6, Plts 315.   CMP with glucose 142, Na 135, K 3.9, BUN 53, sCr 1.46, alk phos 83, ALT 16, AST 14, bilirubin 0.6. Magnesium No results found for requested labs within last 365 days..   BNP No results found for requested labs within last 365 days.. Trop No results found for requested labs within last 365 days..   Lactate No results  found for requested labs within last 365 days.  EKG: Not performed  Imaging - agree with radiology interpretation(s):   Interventions: CBI, Rocephin     7/26/2025: Bladder irrigation continued for hematuria with possible clots in bladder. IV Rocephin continue for complicated UTI.    7/27/2025: No acute events overnight. Irrigation continued with pink color of urine improved. No fever or chills.       Objective     Last Recorded Vitals  /54 (BP Location: Right arm, Patient Position: Lying)   Pulse 67   Temp 36.4 °C (97.6 °F) (Temporal)   Resp 16   Wt 77.1 kg (170 lb)   SpO2 96%   Intake/Output last 3 Shifts:    Intake/Output Summary (Last 24 hours) at 7/27/2025 1217  Last data filed at 7/27/2025 0902  Gross per 24 hour   Intake 1650 ml   Output 700 ml   Net 950 ml       Admission Weight  Weight: 77.1 kg (170 lb) (07/23/25 1025)    Daily Weight  07/23/25 : 77.1 kg (170 lb)    Image Results  Electrocardiogram 12 Lead  Sinus rhythm  Left anterior fascicular block  Abnormal R-wave progression, early transition  Minimal ST elevation, anterolateral leads    Confirmed by ALMA ZELAYA MD ([id number]) on 12/20/2019 10:40:56 AM      Physical Exam  Constitutional:       General: He is not in acute distress.     Appearance: Normal appearance.   HENT:      Head: Normocephalic.      Mouth/Throat:      Mouth: Mucous membranes are moist.      Pharynx: Oropharynx is clear.     Eyes:      Pupils: Pupils are equal, round, and reactive to light.       Cardiovascular:      Rate and Rhythm: Normal rate and regular rhythm.      Heart sounds: Normal heart sounds. No murmur heard.     No gallop.   Pulmonary:      Effort: Pulmonary effort is normal.      Breath sounds: Normal breath sounds.   Abdominal:      General: Bowel sounds are normal. There is no distension.      Palpations: Abdomen is soft.      Tenderness: There is no abdominal tenderness.   Genitourinary:     Comments: Guadarrama in place    Musculoskeletal:         General: No  swelling. Normal range of motion.      Cervical back: Neck supple. No rigidity.     Skin:     General: Skin is warm and dry.     Neurological:      General: No focal deficit present.      Mental Status: He is alert.      Cranial Nerves: No cranial nerve deficit.      Sensory: No sensory deficit.     Psychiatric:         Mood and Affect: Mood normal.         Behavior: Behavior normal.         Relevant Results           Results for orders placed or performed during the hospital encounter of 07/23/25 (from the past 96 hours)   Blood Culture    Specimen: Peripheral Venipuncture; Blood culture   Result Value Ref Range    Blood Culture No growth at 3 days    Blood Culture    Specimen: Peripheral Venipuncture; Blood culture   Result Value Ref Range    Blood Culture No growth at 3 days    Lactate   Result Value Ref Range    Lactate 1.3 0.4 - 2.0 mmol/L   Type and screen   Result Value Ref Range    ABO TYPE O     Rh TYPE POS     ANTIBODY SCREEN NEG    Hemoglobin and Hematocrit, Blood   Result Value Ref Range    Hemoglobin 8.2 (L) 13.5 - 17.5 g/dL    Hematocrit 23.7 (L) 41.0 - 52.0 %   CBC   Result Value Ref Range    WBC 12.3 (H) 4.4 - 11.3 x10*3/uL    nRBC 0.0 0.0 - 0.0 /100 WBCs    RBC 2.47 (L) 4.50 - 5.90 x10*6/uL    Hemoglobin 7.4 (L) 13.5 - 17.5 g/dL    Hematocrit 21.9 (L) 41.0 - 52.0 %    MCV 89 80 - 100 fL    MCH 30.0 26.0 - 34.0 pg    MCHC 33.8 32.0 - 36.0 g/dL    RDW 13.1 11.5 - 14.5 %    Platelets 249 150 - 450 x10*3/uL   Basic metabolic panel   Result Value Ref Range    Glucose 112 (H) 74 - 99 mg/dL    Sodium 136 136 - 145 mmol/L    Potassium 3.6 3.5 - 5.3 mmol/L    Chloride 104 98 - 107 mmol/L    Bicarbonate 24 21 - 32 mmol/L    Anion Gap 12 10 - 20 mmol/L    Urea Nitrogen 55 (H) 6 - 23 mg/dL    Creatinine 1.45 (H) 0.50 - 1.30 mg/dL    eGFR 48 (L) >60 mL/min/1.73m*2    Calcium 8.2 (L) 8.6 - 10.3 mg/dL   Prepare RBC: 2 Units   Result Value Ref Range    PRODUCT CODE B1463F74     Unit Number [id number]     Unit  ABO O     Unit RH POS     XM INTEP COMP     Dispense Status TR     Blood Expiration Date 8/3/2025 11:59:00 PM EDT     PRODUCT BLOOD TYPE 5100     UNIT VOLUME 350     PRODUCT CODE J1815T85     Unit Number [id number]     Unit ABO O     Unit RH POS     XM INTEP COMP     Dispense Status TR     Blood Expiration Date 8/20/2025 11:59:00 PM EDT     PRODUCT BLOOD TYPE 5100     UNIT VOLUME 350    VERIFY ABO/Rh Group Test   Result Value Ref Range    ABO TYPE O     Rh TYPE POS    CBC and Auto Differential   Result Value Ref Range    WBC 11.6 (H) 4.4 - 11.3 x10*3/uL    nRBC 0.5 (H) 0.0 - 0.0 /100 WBCs    RBC 2.51 (L) 4.50 - 5.90 x10*6/uL    Hemoglobin 7.5 (L) 13.5 - 17.5 g/dL    Hematocrit 22.5 (L) 41.0 - 52.0 %    MCV 90 80 - 100 fL    MCH 29.9 26.0 - 34.0 pg    MCHC 33.3 32.0 - 36.0 g/dL    RDW 13.7 11.5 - 14.5 %    Platelets 258 150 - 450 x10*3/uL    Immature Granulocytes %, Automated 12.8 (H) 0.0 - 0.9 %    Immature Granulocytes Absolute, Automated 1.48 (H) 0.00 - 0.50 x10*3/uL   Basic Metabolic Panel   Result Value Ref Range    Glucose 98 74 - 99 mg/dL    Sodium 138 136 - 145 mmol/L    Potassium 3.8 3.5 - 5.3 mmol/L    Chloride 108 (H) 98 - 107 mmol/L    Bicarbonate 25 21 - 32 mmol/L    Anion Gap 9 (L) 10 - 20 mmol/L    Urea Nitrogen 39 (H) 6 - 23 mg/dL    Creatinine 1.11 0.50 - 1.30 mg/dL    eGFR 66 >60 mL/min/1.73m*2    Calcium 8.5 (L) 8.6 - 10.3 mg/dL   Manual Differential   Result Value Ref Range    Neutrophils %, Manual 58.0 40.0 - 80.0 %    Bands %, Manual 1.0 0.0 - 5.0 %    Lymphocytes %, Manual 12.0 13.0 - 44.0 %    Monocytes %, Manual 10.0 2.0 - 10.0 %    Eosinophils %, Manual 7.0 0.0 - 6.0 %    Basophils %, Manual 0.0 0.0 - 2.0 %    Metamyelocytes %, Manual 10.0 0.0 - 0.0 %    Myelocytes %, Manual 2.0 0.0 - 0.0 %    Seg Neutrophils Absolute, Manual 6.73 (H) 1.60 - 5.00 x10*3/uL    Bands Absolute, Manual 0.12 0.00 - 0.50 x10*3/uL    Lymphocytes Absolute, Manual 1.39 0.80 - 3.00 x10*3/uL    Monocytes Absolute,  Manual 1.16 (H) 0.05 - 0.80 x10*3/uL    Eosinophils Absolute, Manual 0.81 (H) 0.00 - 0.40 x10*3/uL    Basophils Absolute, Manual 0.00 0.00 - 0.10 x10*3/uL    Metamyelocytes Absolute, Manual 1.16 0.00 - 0.00 x10*3/uL    Myelocytes Absolute, Manual 0.23 0.00 - 0.00 x10*3/uL    Total Cells Counted 100     Neutrophils Absolute, Manual 6.85 (H) 1.60 - 5.50 x10*3/uL    RBC Morphology See Below     Hypochromia Mild    CBC and Auto Differential   Result Value Ref Range    WBC 12.8 (H) 4.4 - 11.3 x10*3/uL    nRBC 0.6 (H) 0.0 - 0.0 /100 WBCs    RBC 2.96 (L) 4.50 - 5.90 x10*6/uL    Hemoglobin 9.1 (L) 13.5 - 17.5 g/dL    Hematocrit 26.4 (L) 41.0 - 52.0 %    MCV 89 80 - 100 fL    MCH 30.7 26.0 - 34.0 pg    MCHC 34.5 32.0 - 36.0 g/dL    RDW 13.9 11.5 - 14.5 %    Platelets 250 150 - 450 x10*3/uL    Immature Granulocytes %, Automated 13.9 (H) 0.0 - 0.9 %    Immature Granulocytes Absolute, Automated 1.77 (H) 0.00 - 0.50 x10*3/uL   Manual Differential   Result Value Ref Range    Neutrophils %, Manual 66.0 40.0 - 80.0 %    Bands %, Manual 4.0 0.0 - 5.0 %    Lymphocytes %, Manual 14.0 13.0 - 44.0 %    Monocytes %, Manual 7.0 2.0 - 10.0 %    Eosinophils %, Manual 4.0 0.0 - 6.0 %    Basophils %, Manual 0.0 0.0 - 2.0 %    Metamyelocytes %, Manual 5.0 0.0 - 0.0 %    Seg Neutrophils Absolute, Manual 8.45 (H) 1.60 - 5.00 x10*3/uL    Bands Absolute, Manual 0.51 (H) 0.00 - 0.50 x10*3/uL    Lymphocytes Absolute, Manual 1.79 0.80 - 3.00 x10*3/uL    Monocytes Absolute, Manual 0.90 (H) 0.05 - 0.80 x10*3/uL    Eosinophils Absolute, Manual 0.51 (H) 0.00 - 0.40 x10*3/uL    Basophils Absolute, Manual 0.00 0.00 - 0.10 x10*3/uL    Metamyelocytes Absolute, Manual 0.64 0.00 - 0.00 x10*3/uL    Total Cells Counted 100     Neutrophils Absolute, Manual 8.96 (H) 1.60 - 5.50 x10*3/uL    RBC Morphology See Below     Polychromasia Mild    CBC and Auto Differential   Result Value Ref Range    WBC 13.2 (H) 4.4 - 11.3 x10*3/uL    nRBC 0.4 (H) 0.0 - 0.0 /100 WBCs     RBC 2.65 (L) 4.50 - 5.90 x10*6/uL    Hemoglobin 8.0 (L) 13.5 - 17.5 g/dL    Hematocrit 23.9 (L) 41.0 - 52.0 %    MCV 90 80 - 100 fL    MCH 30.2 26.0 - 34.0 pg    MCHC 33.5 32.0 - 36.0 g/dL    RDW 13.7 11.5 - 14.5 %    Platelets 263 150 - 450 x10*3/uL    Immature Granulocytes %, Automated 17.0 (H) 0.0 - 0.9 %    Immature Granulocytes Absolute, Automated 2.24 (H) 0.00 - 0.50 x10*3/uL   Basic Metabolic Panel   Result Value Ref Range    Glucose 97 74 - 99 mg/dL    Sodium 137 136 - 145 mmol/L    Potassium 4.1 3.5 - 5.3 mmol/L    Chloride 106 98 - 107 mmol/L    Bicarbonate 27 21 - 32 mmol/L    Anion Gap 8 (L) 10 - 20 mmol/L    Urea Nitrogen 29 (H) 6 - 23 mg/dL    Creatinine 0.90 0.50 - 1.30 mg/dL    eGFR 85 >60 mL/min/1.73m*2    Calcium 8.1 (L) 8.6 - 10.3 mg/dL   Manual Differential   Result Value Ref Range    Neutrophils %, Manual 66.0 40.0 - 80.0 %    Bands %, Manual 3.0 0.0 - 5.0 %    Lymphocytes %, Manual 13.0 13.0 - 44.0 %    Monocytes %, Manual 3.0 2.0 - 10.0 %    Eosinophils %, Manual 6.0 0.0 - 6.0 %    Basophils %, Manual 0.0 0.0 - 2.0 %    Atypical Lymphocytes %, Manual 1.0 0.0 - 2.0 %    Metamyelocytes %, Manual 7.0 0.0 - 0.0 %    Myelocytes %, Manual 1.0 0.0 - 0.0 %    Seg Neutrophils Absolute, Manual 8.71 (H) 1.60 - 5.00 x10*3/uL    Bands Absolute, Manual 0.40 0.00 - 0.50 x10*3/uL    Lymphocytes Absolute, Manual 1.72 0.80 - 3.00 x10*3/uL    Monocytes Absolute, Manual 0.40 0.05 - 0.80 x10*3/uL    Eosinophils Absolute, Manual 0.79 (H) 0.00 - 0.40 x10*3/uL    Basophils Absolute, Manual 0.00 0.00 - 0.10 x10*3/uL    Atypical Lymphs Absolute, Manual 0.13 0.00 - 0.30 x10*3/uL    Metamyelocytes Absolute, Manual 0.92 0.00 - 0.00 x10*3/uL    Myelocytes Absolute, Manual 0.13 0.00 - 0.00 x10*3/uL    Total Cells Counted 100     Neutrophils Absolute, Manual 9.11 (H) 1.60 - 5.50 x10*3/uL    RBC Morphology No significant RBC morphology present    CBC and Auto Differential   Result Value Ref Range    WBC 15.7 (H) 4.4 - 11.3  x10*3/uL    nRBC 0.3 (H) 0.0 - 0.0 /100 WBCs    RBC 2.72 (L) 4.50 - 5.90 x10*6/uL    Hemoglobin 8.2 (L) 13.5 - 17.5 g/dL    Hematocrit 25.3 (L) 41.0 - 52.0 %    MCV 93 80 - 100 fL    MCH 30.1 26.0 - 34.0 pg    MCHC 32.4 32.0 - 36.0 g/dL    RDW 14.1 11.5 - 14.5 %    Platelets 298 150 - 450 x10*3/uL    Immature Granulocytes %, Automated 17.5 (H) 0.0 - 0.9 %    Immature Granulocytes Absolute, Automated 2.74 (H) 0.00 - 0.50 x10*3/uL   Basic Metabolic Panel   Result Value Ref Range    Glucose 93 74 - 99 mg/dL    Sodium 136 136 - 145 mmol/L    Potassium 4.2 3.5 - 5.3 mmol/L    Chloride 106 98 - 107 mmol/L    Bicarbonate 25 21 - 32 mmol/L    Anion Gap 9 (L) 10 - 20 mmol/L    Urea Nitrogen 22 6 - 23 mg/dL    Creatinine 0.88 0.50 - 1.30 mg/dL    eGFR 86 >60 mL/min/1.73m*2    Calcium 8.2 (L) 8.6 - 10.3 mg/dL   Manual Differential   Result Value Ref Range    Neutrophils %, Manual 68.0 40.0 - 80.0 %    Bands %, Manual 2.0 0.0 - 5.0 %    Lymphocytes %, Manual 13.0 13.0 - 44.0 %    Monocytes %, Manual 6.0 2.0 - 10.0 %    Eosinophils %, Manual 4.0 0.0 - 6.0 %    Basophils %, Manual 0.0 0.0 - 2.0 %    Atypical Lymphocytes %, Manual 1.0 0.0 - 2.0 %    Metamyelocytes %, Manual 3.0 0.0 - 0.0 %    Myelocytes %, Manual 3.0 0.0 - 0.0 %    Seg Neutrophils Absolute, Manual 10.68 (H) 1.60 - 5.00 x10*3/uL    Bands Absolute, Manual 0.31 0.00 - 0.50 x10*3/uL    Lymphocytes Absolute, Manual 2.04 0.80 - 3.00 x10*3/uL    Monocytes Absolute, Manual 0.94 (H) 0.05 - 0.80 x10*3/uL    Eosinophils Absolute, Manual 0.63 (H) 0.00 - 0.40 x10*3/uL    Basophils Absolute, Manual 0.00 0.00 - 0.10 x10*3/uL    Atypical Lymphs Absolute, Manual 0.16 0.00 - 0.30 x10*3/uL    Metamyelocytes Absolute, Manual 0.47 0.00 - 0.00 x10*3/uL    Myelocytes Absolute, Manual 0.47 0.00 - 0.00 x10*3/uL    Total Cells Counted 100     Neutrophils Absolute, Manual 10.99 (H) 1.60 - 5.50 x10*3/uL    RBC Morphology See Below     Polychromasia Mild      No results found.                    Assessment & Plan  Hematuria, unspecified type    Gross hematuria      1. Hematuria, unspecified type      bladder irrigation per Urology, monitor H/H      2. Urinary tract infection associated with indwelling urethral catheter, subsequent encounter      on IV Rocephin      3. Benign prostatic hyperplasia with urinary obstruction and other lower urinary tract symptoms        4. Anemia, unspecified type      monitor H/H      5. Sepsis, due to unspecified organism, unspecified whether acute organ dysfunction present (Multi)        6. Gross hematuria  Case Request Operating Room: CYSTOSCOPY, WITH LESION EXCISION    Case Request Operating Room: CYSTOSCOPY, WITH LESION EXCISION                   Leidy Coyle MD

## 2025-07-27 NOTE — CARE PLAN
Problem: Pain - Adult  Goal: Verbalizes/displays adequate comfort level or baseline comfort level  Outcome: Progressing     Problem: Safety - Adult  Goal: Free from fall injury  Outcome: Progressing     Problem: Discharge Planning  Goal: Discharge to home or other facility with appropriate resources  Outcome: Progressing     Problem: Chronic Conditions and Co-morbidities  Goal: Patient's chronic conditions and co-morbidity symptoms are monitored and maintained or improved  Outcome: Progressing     Problem: Nutrition  Goal: Nutrient intake appropriate for maintaining nutritional needs  Outcome: Progressing     Problem: Musculoskeletal - Adult  Goal: Return ADL status to a safe level of function  Outcome: Progressing     Problem: Infection - Adult  Goal: Absence of infection during hospitalization  Outcome: Progressing

## 2025-07-27 NOTE — CARE PLAN
The patient's goals for the shift include      The clinical goals for the shift include Pt to have pink to clear urine by end of shift. Patient to remain free from falls or injury throughout this shift.

## 2025-07-27 NOTE — CARE PLAN
The patient's goals for the shift include      The clinical goals for the shift include Pt to have pink to clear urine by end of shift. Patient to remain free from falls or injury throughout this shift.    Over the shift, the patient did not make progress toward the following goals. Barriers to progression include Recommendations to address these barriers include continued  bladder irrigation.

## 2025-07-28 ENCOUNTER — ANESTHESIA EVENT (OUTPATIENT)
Dept: OPERATING ROOM | Facility: HOSPITAL | Age: 80
DRG: 698 | End: 2025-07-28
Payer: MEDICARE

## 2025-07-28 ENCOUNTER — APPOINTMENT (OUTPATIENT)
Dept: UROLOGY | Facility: CLINIC | Age: 80
End: 2025-07-28
Payer: MEDICARE

## 2025-07-28 LAB
ABO GROUP (TYPE) IN BLOOD: NORMAL
ANTIBODY SCREEN: NORMAL
BLOOD EXPIRATION DATE: NORMAL
DISPENSE STATUS: NORMAL
ERYTHROCYTE [DISTWIDTH] IN BLOOD BY AUTOMATED COUNT: 14.4 % (ref 11.5–14.5)
HCT VFR BLD AUTO: 23.6 % (ref 41–52)
HGB BLD-MCNC: 7.7 G/DL (ref 13.5–17.5)
MCH RBC QN AUTO: 30 PG (ref 26–34)
MCHC RBC AUTO-ENTMCNC: 32.6 G/DL (ref 32–36)
MCV RBC AUTO: 92 FL (ref 80–100)
NRBC BLD-RTO: 0.3 /100 WBCS (ref 0–0)
PLATELET # BLD AUTO: 292 X10*3/UL (ref 150–450)
PRODUCT BLOOD TYPE: 5100
PRODUCT CODE: NORMAL
RBC # BLD AUTO: 2.57 X10*6/UL (ref 4.5–5.9)
RH FACTOR (ANTIGEN D): NORMAL
UNIT ABO: NORMAL
UNIT NUMBER: NORMAL
UNIT RH: NORMAL
UNIT VOLUME: 350
WBC # BLD AUTO: 14.7 X10*3/UL (ref 4.4–11.3)
XM INTEP: NORMAL

## 2025-07-28 PROCEDURE — 2500000004 HC RX 250 GENERAL PHARMACY W/ HCPCS (ALT 636 FOR OP/ED): Performed by: NURSE PRACTITIONER

## 2025-07-28 PROCEDURE — 2500000001 HC RX 250 WO HCPCS SELF ADMINISTERED DRUGS (ALT 637 FOR MEDICARE OP): Performed by: NURSE PRACTITIONER

## 2025-07-28 PROCEDURE — 36430 TRANSFUSION BLD/BLD COMPNT: CPT

## 2025-07-28 PROCEDURE — 85027 COMPLETE CBC AUTOMATED: CPT | Performed by: INTERNAL MEDICINE

## 2025-07-28 PROCEDURE — 99232 SBSQ HOSP IP/OBS MODERATE 35: CPT | Performed by: UROLOGY

## 2025-07-28 PROCEDURE — 97161 PT EVAL LOW COMPLEX 20 MIN: CPT | Mod: GP

## 2025-07-28 PROCEDURE — 86923 COMPATIBILITY TEST ELECTRIC: CPT

## 2025-07-28 PROCEDURE — 86901 BLOOD TYPING SEROLOGIC RH(D): CPT | Performed by: PHYSICIAN ASSISTANT

## 2025-07-28 PROCEDURE — 36415 COLL VENOUS BLD VENIPUNCTURE: CPT | Performed by: PHYSICIAN ASSISTANT

## 2025-07-28 PROCEDURE — 2500000002 HC RX 250 W HCPCS SELF ADMINISTERED DRUGS (ALT 637 FOR MEDICARE OP, ALT 636 FOR OP/ED): Performed by: NURSE PRACTITIONER

## 2025-07-28 PROCEDURE — 99233 SBSQ HOSP IP/OBS HIGH 50: CPT | Performed by: PHYSICIAN ASSISTANT

## 2025-07-28 PROCEDURE — 2500000001 HC RX 250 WO HCPCS SELF ADMINISTERED DRUGS (ALT 637 FOR MEDICARE OP): Performed by: PHYSICIAN ASSISTANT

## 2025-07-28 PROCEDURE — 2500000002 HC RX 250 W HCPCS SELF ADMINISTERED DRUGS (ALT 637 FOR MEDICARE OP, ALT 636 FOR OP/ED)

## 2025-07-28 PROCEDURE — 1210000001 HC SEMI-PRIVATE ROOM DAILY

## 2025-07-28 PROCEDURE — P9016 RBC LEUKOCYTES REDUCED: HCPCS

## 2025-07-28 PROCEDURE — 36415 COLL VENOUS BLD VENIPUNCTURE: CPT | Performed by: INTERNAL MEDICINE

## 2025-07-28 RX ORDER — LANOLIN ALCOHOL/MO/W.PET/CERES
1000 CREAM (GRAM) TOPICAL DAILY
Status: DISCONTINUED | OUTPATIENT
Start: 2025-07-28 | End: 2025-07-30 | Stop reason: HOSPADM

## 2025-07-28 RX ORDER — FOLIC ACID 1 MG/1
1 TABLET ORAL DAILY
Status: DISCONTINUED | OUTPATIENT
Start: 2025-07-28 | End: 2025-07-30 | Stop reason: HOSPADM

## 2025-07-28 RX ADMIN — TAMSULOSIN HYDROCHLORIDE 0.4 MG: 0.4 CAPSULE ORAL at 08:30

## 2025-07-28 RX ADMIN — PANTOPRAZOLE SODIUM 40 MG: 40 TABLET, DELAYED RELEASE ORAL at 06:50

## 2025-07-28 RX ADMIN — OXYBUTYNIN CHLORIDE 5 MG: 5 TABLET ORAL at 20:21

## 2025-07-28 RX ADMIN — GABAPENTIN 300 MG: 300 CAPSULE ORAL at 17:16

## 2025-07-28 RX ADMIN — POLYETHYLENE GLYCOL 3350 17 G: 17 POWDER, FOR SOLUTION ORAL at 08:32

## 2025-07-28 RX ADMIN — OXYBUTYNIN CHLORIDE 5 MG: 5 TABLET ORAL at 08:30

## 2025-07-28 RX ADMIN — GABAPENTIN 300 MG: 300 CAPSULE ORAL at 20:21

## 2025-07-28 RX ADMIN — FOLIC ACID 1 MG: 1 TABLET ORAL at 09:04

## 2025-07-28 RX ADMIN — GABAPENTIN 300 MG: 300 CAPSULE ORAL at 13:57

## 2025-07-28 RX ADMIN — SIMVASTATIN 20 MG: 20 TABLET, FILM COATED ORAL at 20:21

## 2025-07-28 RX ADMIN — GABAPENTIN 300 MG: 300 CAPSULE ORAL at 06:50

## 2025-07-28 RX ADMIN — Medication 1000 MCG: at 09:04

## 2025-07-28 ASSESSMENT — COGNITIVE AND FUNCTIONAL STATUS - GENERAL
TURNING FROM BACK TO SIDE WHILE IN FLAT BAD: A LITTLE
MOVING FROM LYING ON BACK TO SITTING ON SIDE OF FLAT BED WITH BEDRAILS: A LITTLE
DAILY ACTIVITIY SCORE: 24
CLIMB 3 TO 5 STEPS WITH RAILING: A LOT
STANDING UP FROM CHAIR USING ARMS: A LITTLE
WALKING IN HOSPITAL ROOM: A LITTLE
WALKING IN HOSPITAL ROOM: A LITTLE
CLIMB 3 TO 5 STEPS WITH RAILING: A LITTLE
MOBILITY SCORE: 17
MOBILITY SCORE: 21
STANDING UP FROM CHAIR USING ARMS: A LITTLE
MOVING TO AND FROM BED TO CHAIR: A LITTLE

## 2025-07-28 ASSESSMENT — ENCOUNTER SYMPTOMS
FREQUENCY: 0
NAUSEA: 0
TROUBLE SWALLOWING: 0
EYE PAIN: 0
VOMITING: 0
DIARRHEA: 0
FATIGUE: 0
HEMATURIA: 0
HEADACHES: 0
WEAKNESS: 0
ABDOMINAL PAIN: 0
LIGHT-HEADEDNESS: 1
DIZZINESS: 0
CHILLS: 0
HALLUCINATIONS: 0
WOUND: 0
JOINT SWELLING: 0
SORE THROAT: 0
BACK PAIN: 0
BRUISES/BLEEDS EASILY: 0
FLANK PAIN: 0
FEVER: 0
SHORTNESS OF BREATH: 1
DYSURIA: 0
DIAPHORESIS: 0
COUGH: 0
PALPITATIONS: 0
CHEST TIGHTNESS: 0
WHEEZING: 0
FACIAL SWELLING: 0
APPETITE CHANGE: 0
CONSTIPATION: 0
NUMBNESS: 0
BLOOD IN STOOL: 0

## 2025-07-28 ASSESSMENT — ACTIVITIES OF DAILY LIVING (ADL): ADL_ASSISTANCE: INDEPENDENT

## 2025-07-28 ASSESSMENT — PAIN SCALES - GENERAL
PAINLEVEL_OUTOF10: 0 - NO PAIN

## 2025-07-28 ASSESSMENT — PAIN - FUNCTIONAL ASSESSMENT
PAIN_FUNCTIONAL_ASSESSMENT: 0-10
PAIN_FUNCTIONAL_ASSESSMENT: 0-10

## 2025-07-28 NOTE — NURSING NOTE
CBI continues with light pink drainage. No issues throughout the shift. Patient bed remains in the low locked position with call light in hand.

## 2025-07-28 NOTE — NURSING NOTE
End of shift note: Patient currently resting comfortably in bed. No pain noted at this time. Continuous bladder irrigation is going very slowly at this time, no clots or blood noted in urine and the patient reports no discomfort. Patient is currently receiving a unit of packed RBC's and is tolerating it well.

## 2025-07-28 NOTE — PROGRESS NOTES
Physical Therapy    Physical Therapy Evaluation    Patient Name: Benjamin Mae  MRN: [id number]  Today's Date: 7/28/2025   Time Calculation  Start Time: 1001  Stop Time: 1016  Time Calculation (min): 15 min  2332/2332-A    Assessment/Plan   PT Assessment  PT Assessment Results: Decreased strength, Decreased endurance, Impaired balance, Decreased mobility  Rehab Prognosis: Good  Barriers to Discharge Home: Physical needs, Caregiver assistance  Caregiver Assistance: Patient lives alone and/or does not have reliable caregiver assistance  Physical Needs: Stair navigation into home limited by function/safety, Intermittent mobility assistance needed  Evaluation/Treatment Tolerance: Patient tolerated treatment well  Medical Staff Made Aware: Yes  End of Session Communication: Bedside nurse  Assessment Comment: Patient CGA x 1 for all mobility this session with use of FWW with 1 LOB, able to self correct. Would benefit from continued PT focusing on balance, endurance, functional mobility, and safety.  End of Session Patient Position: Bed, 2 rail up, Alarm on  IP OR SWING BED PT PLAN  Inpatient or Swing Bed: Inpatient  PT Plan  Treatment/Interventions: Bed mobility, Transfer training, Gait training, Stair training, Balance training, Neuromuscular re-education, Strengthening, Endurance training, Therapeutic exercise, Therapeutic activity, Home exercise program  PT Plan: Ongoing PT  PT Frequency: 4 times per week  PT Discharge Recommendations: Low intensity level of continued care (pending patient progress)  Equipment Recommended upon Discharge:  (TBD)  PT Recommended Transfer Status: Assist x1, Contact guard  PT - OK to Discharge: Yes (when medically cleared)        General Visit Information:  General  Reason for Referral: Impaired mobility, hematuria  Referred By: Naomy  Past Medical History Relevant to Rehab: PMHx s/f HTN, HLD, Lumbar spine disease, peripheral neuropathy, BPH with chronic urinary retention- agarwal  dependent presenting with hematuria  Prior to Session Communication: Bedside nurse  Patient Position Received: Bed, 3 rail up, Alarm off, not on at start of session  General Comment: Room 2332. Patient agreeable to therapy with motivation. Gu[place] and constant irrigation in    Home Living:  Home Living  Type of Home: House  Lives With: Alone  Home Adaptive Equipment: Walker rolling or standard  Home Layout: One level  Home Access: Stairs to enter with rails  Entrance Stairs-Number of Steps: 3  Bathroom Shower/Tub: Tub/shower unit  Bathroom Equipment: Grab bars in shower (has shower chair, not using)    Prior Level of Function:  Prior Function Per Pt/Caregiver Report  Level of [place]: Independent with ADLs and functional transfers, Independent with homemaking with ambulation  Receives Help From: Family (Son and daughter in law lives 5 mins away)  ADL Assistance: Independent  Homemaking Assistance: Independent  Ambulatory Assistance:  (Intermittently using FWW)  Prior Function Comments: Independent with all yardwork and outside chores, drives    Precautions:  Precautions  Medical Precautions: Fall precautions  Precautions Comment: Guad[place] and continous bladder irrigation    Vital Signs:     Objective     Pain:  Pain Assessment  0-10 (Numeric) Pain Score: 0 - No pain    Cognition:  Cognition  Overall Cognitive Status: Within Functional Limits  Orientation Level: Oriented X4  Insight: Mild  Impulsive: Mildly  Processing Speed: Within funtional limits    General Assessments:      Activity Tolerance  Endurance: Endurance does not limit participation in activity  Activity Tolerance Comments: Denied lightheadedness during session  Sensation  Sensation Comment: States neuropathy in bilateral feet  Strength  Strength Comments: BLEs grossly 4/5           Static Sitting Balance  Static Sitting-Comment/Number of Minutes: Fair+ SBA  Dynamic Sitting Balance  Dynamic Sitting-Comments: Fair CGA  Static Standing Balance  Static  Standing-Comment/Number of Minutes: Fair FWW CGA  Dynamic Standing Balance  Dynamic Standing-Comments: Fair CGA, demonstrated 1 LOB, able to self corrrect with FWW    Functional Assessments:     Bed Mobility  Bed Mobility:  (Supine to sit EOB w/HOB elevated and use of rail CGA x 1 with cueing for safety and hand placement)  Transfers  Transfer:  (Sit to stand from EOB CGA x 1 wiht cues for hand placement and FWW safety. Patient pulled on FWW with stand, educated on pushing thru bed for increased safety)  Ambulation/Gait Training  Ambulation/Gait Training Performed:  (AMB 40ft FWW CGA x 1, demonstrated 1 LOB with ability to self correct, cueing for pacing, safety, and FWW use. Denied lightheadedness or dizziness, stated LOB from neuropathy)  Stairs  Stairs: No       Extremity/Trunk Assessments:                Outcome Measures:     [facility] Basic Mobility  Turning from your back to your side while in a flat bed without using bedrails: A little  Moving from lying on your back to sitting on the side of a flat bed without using bedrails: A little  Moving to and from bed to chair (including a wheelchair): A little  Standing up from a chair using your arms (e.g. wheelchair or bedside chair): A little  To walk in hospital room: A little  Climbing 3-5 steps with railing: A lot  Basic Mobility - Total Score: 17                                                             Goals:  Encounter Problems       Encounter Problems (Active)       PT Problem       Transfers       Start:  07/28/25    Expected End:  08/11/25       Patient will perform all transfers with FWW SBA           Gait       Start:  07/28/25    Expected End:  08/11/25       Patient will amb 100+ feet with FWW CGA x1 with no LOB           Stairs       Start:  07/28/25    Expected End:  08/11/25       Patient will ascend/descend 3+ stairs with rail/device prn and SBA x1           Strengthening       Start:  07/28/25    Expected End:  08/11/25       Patient will perform  20+ reps of AROM/RROM and high level balance activities with standing and AMB for ENIO LE's to improve safety and functional independence                 Education Documentation  Precautions, taught by AIRAM Rodriguez at 7/28/2025 12:22 PM.  Learner: Patient  Readiness: Acceptance  Method: Explanation  Response: Needs Reinforcement  Comment: For increased safety    Mobility Training, taught by AIRAM Rodriguez at 7/28/2025 12:22 PM.  Learner: Patient  Readiness: Acceptance  Method: Explanation  Response: Needs Reinforcement  Comment: For increased safety    Education Comments  No comments found.        Completion of this session, clinical decision making, and documentation performed under the supervision/direction of Marcelle Rodriguez.      AIRAM RODRIGUEZ

## 2025-07-28 NOTE — PROGRESS NOTES
07/28/25 1040   Discharge Planning   Living Arrangements Alone   Support Systems Children   Assistance Needed has a walker, canes and grab bars only uses the grab bars at this time   Type of Residence Private residence   Number of Stairs to Enter Residence 2   Number of Stairs Within Residence 0   Do you have animals or pets at home? No   Who is requesting discharge planning? Provider   Home or Post Acute Services None   Expected Discharge Disposition Home   Does the patient need discharge transport arranged? No     Spoke with pt explained TCC role in Care Transitions and verified address, phone number and emergency contact information. PCP is Kenneth last seen in June and preferred pharmacy is [facility] , denies issues obtaining or affording medications and takes as ordered. Pt is independent, lives at home alone and feels safe.  His brother lives next door and his daughter-in-law lives 5 miles away and someone comes by everyday. He cooks but they often bring him food as well. He cleans up but he has a cleaning lady too. Pt plans to return home no new needs. Pt is aware to reach out to CT if needs should change. AMPAC 15/21 pending eval. ADOD 7/30.  We reviewed the [facility] pt denies questions, provided the [facility] phone number [phone] and explained staff would be by with the paper for him to sign and would have all the information on there for him as well. CT to follow. Masha Dougherty BSN/RN-TCC      0835 received a message from the floor nurse that the family would like the pt to go to a SNF at discharge as the family is not going to be able to do as much as they usually do for him do to their own medical issue. However the pt has already told me no he wants to go home and he is of sound mind. I did explain that now his AMPAC score is back and it is 15/17 and if they talk to him and he changes his mind I will call back but he has to tell me himself he will go to a SNF and then I will get them a  choice list. CT to follow. Masha CARRENO/RN-TCC      1499 see follow up note from [facility] and SNF lists provided. Masha CARRENO/RN-TCC

## 2025-07-28 NOTE — PROGRESS NOTES
"Benjamin Mae is a 80 y.o. male on day 5 of admission presenting with Hematuria, unspecified type.    Subjective   No more suprapubic pain, no more bladder       Objective   CBI on, urine light pink    Hemoglobin 7.7  Physical Exam    Last Recorded Vitals  Blood pressure 113/63, pulse 68, temperature 36.4 °C (97.6 °F), temperature source Temporal, resp. rate 17, height 1.753 m (5' 9\"), weight 77.1 kg (170 lb), SpO2 96%.  Intake/Output last 3 Shifts:  I/O last 3 completed shifts:  In: 1420 (18.4 mL/kg) [P.O.:1420]  Out: 6650 (86.2 mL/kg) [Urine:6650 (2.4 mL/kg/hr)]  Weight: 77.1 kg     Relevant Results                            Assessment & Plan  Hematuria, unspecified type    Gross hematuria    Impression  Gross hematuria  Chronic retention  Bladder spasm    Plan  N.p.o. after midnight  Medical clearance  Cystoscopy, possible biopsy possible tumor resection tomorrow      I spent 25 minutes in the professional and overall care of this patient.      Kumar Enriquez MD    "

## 2025-07-28 NOTE — PROGRESS NOTES
Benjamin Mae is a 80 y.o. male on day 5 of admission presenting with Hematuria, unspecified type.      Subjective   Benjamin Mae is a 80 y.o. 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  Patient had been seen in the emergency department over the weekend with complaint of hematuria.  A three-way catheter has been inserted patient was irrigated patient wanted to go home and follow-up with his urologist on Monday.  The patient had been discharged to home and returned again over the weekend to the emergency department with similar issues the Agarwal catheter was not draining and the patient could not get it to improve.  Had additional irrigation performed and followed up with urology on Monday.  His catheter was evaluated by urology and irrigated clots were removed patient was placed on Cipro.  This morning the patient returns to the hospital again with abdominal pain and his Agarwal catheter not draining.  Patient was seen Agarwal catheter irrigated set up with CBI patient now having ongoing hematuria.  He does complain of some chills as well as weakness unable to get up off the couch.  No severe abdominal pain after having received some pain medicine prior to arrival. CBC with WBC 20.6, Hgb 9.6, Plts 315. CMP with glucose 142, Na 135, K 3.9, BUN 53, sCr 1.46, alk phos 83, ALT 16, AST 14, bilirubin 0.6.    7/28/2025: No acute events overnight. Vitals stable, afebrile.  CBC reviewed, hemoglobin is 7.7-this is likely from acute blood loss as his baseline appears to be within normal limits approximately 13.  Still has leukocytosis 14.7. Anemia studies were completed on day of arrival with a normal ferritin, normal iron of 39, TIBC slightly low at 221 with percent saturation slightly low at 18%  Urine culture from 7/23/2025 with no growth.  Blood cultures x 2 no growth x 4 days, final.  Patient seen by  "urologist-continue CBI with manual irrigation as needed.  Oxybutynin added.  Patient to go to OR tomorrow for cystoscopy with possible biopsy and possible resection of bladder tumor    Patient reports he feels \"like he had one too many\" but not overtly lightheaded, he also feels ERIC. He states this is how he felt prior to his blood transfusion when his levels were this low. Will I've 1 unit PRBC today for symptomatic anemia.        Patient evaluated in the presence of Pharmacist, RN, and Pharmacy student(s)    Review of Systems   Constitutional:  Negative for appetite change, chills, diaphoresis, fatigue and fever.   HENT:  Negative for congestion, ear pain, facial swelling, hearing loss, nosebleeds, sore throat, tinnitus and trouble swallowing.    Eyes:  Negative for pain.   Respiratory:  Positive for shortness of breath. Negative for cough, chest tightness and wheezing.    Cardiovascular:  Negative for chest pain, palpitations and leg swelling.   Gastrointestinal:  Negative for abdominal pain, blood in stool, constipation, diarrhea, nausea and vomiting.   Genitourinary:  Negative for dysuria, flank pain, frequency, hematuria and urgency.   Musculoskeletal:  Negative for back pain and joint swelling.   Skin:  Negative for rash and wound.   Neurological:  Positive for light-headedness. Negative for dizziness, syncope, weakness, numbness and headaches.   Hematological:  Does not bruise/bleed easily.   Psychiatric/Behavioral:  Negative for behavioral problems, hallucinations and suicidal ideas.           Objective     Last Recorded Vitals  /61 (BP Location: Left arm, Patient Position: Lying)   Pulse 59   Temp 36.3 °C (97.4 °F) (Temporal)   Resp 16   Wt 77.1 kg (170 lb)   SpO2 96%     Image Results  Imaging  No results found.    Cardiology, Vascular, and Other Imaging  No other imaging results found for the past 7 days       Lab Results  Results for orders placed or performed during the hospital encounter of " 07/23/25 (from the past 24 hours)   CBC   Result Value Ref Range    WBC 14.7 (H) 4.4 - 11.3 x10*3/uL    nRBC 0.3 (H) 0.0 - 0.0 /100 WBCs    RBC 2.57 (L) 4.50 - 5.90 x10*6/uL    Hemoglobin 7.7 (L) 13.5 - 17.5 g/dL    Hematocrit 23.6 (L) 41.0 - 52.0 %    MCV 92 80 - 100 fL    MCH 30.0 26.0 - 34.0 pg    MCHC 32.6 32.0 - 36.0 g/dL    RDW 14.4 11.5 - 14.5 %    Platelets 292 150 - 450 x10*3/uL        Medications  Scheduled medications:  Scheduled Medications[1]  Continuous medications:  Continuous Medications[2]  PRN medications:  PRN Medications[3]     Physical Exam  Constitutional:       General: He is not in acute distress.     Appearance: Normal appearance.   HENT:      Head: Normocephalic and atraumatic.      Right Ear: External ear normal.      Left Ear: External ear normal.      Nose: Nose normal.      Mouth/Throat:      Mouth: Mucous membranes are moist.      Pharynx: Oropharynx is clear.     Eyes:      Extraocular Movements: Extraocular movements intact.      Conjunctiva/sclera: Conjunctivae normal.      Pupils: Pupils are equal, round, and reactive to light.       Cardiovascular:      Rate and Rhythm: Normal rate and regular rhythm.      Pulses: Normal pulses.      Heart sounds: Normal heart sounds.   Pulmonary:      Effort: Pulmonary effort is normal. No respiratory distress.      Breath sounds: Normal breath sounds. No wheezing, rhonchi or rales.   Abdominal:      General: Bowel sounds are normal.      Palpations: Abdomen is soft.      Tenderness: There is no abdominal tenderness. There is no right CVA tenderness, left CVA tenderness, guarding or rebound.   Genitourinary:     Comments: Guadarrama with CBI with clear light yellow urine    Musculoskeletal:         General: No swelling. Normal range of motion.      Cervical back: Normal range of motion and neck supple.     Skin:     General: Skin is warm and dry.      Capillary Refill: Capillary refill takes less than 2 seconds.      Findings: No rash.      Neurological:      General: No focal deficit present.      Mental Status: He is alert and oriented to person, place, and time. Mental status is at baseline.     Psychiatric:         Mood and Affect: Mood normal.         Behavior: Behavior normal.                  Assessment/Plan        This patient has a urinary catheter   Reason for the urinary catheter remaining today? 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 symptomatic  Monitor H/H  Transfuse as needed-appears patient was given blood transfusion of 1 unit packed red blood cells on 7/24/2025 and additional 1 unit packed red blood cells on 7/25/2025 given patient was dizzy with ambulating leading to symptomatic anemia.   Transfuse additional 1 unit packed red blood cells on 7/28/2025 given patient lightheaded and ERIC  CBI with three-way catheter- Guadarrama with CBI with clear light yellow urine 7/28/25  Urology consulted- Patient seen by urologist-continue CBI with manual irrigation as needed.  Oxybutynin added.    Supplement B12 and folate  Iron studies noted    Patient to go to OR 7/29/25 for cystoscopy with possible biopsy and possible resection of bladder tumor    Revised Cardiac Risk Index (RCRI):  1. Elevated-risk surgery (Intraperitoneal; intrathoracic; suprainguinal vascular): No (+0)  2. H/o ischemic heart disease (H/o MI; hx positive exercise test; current CP d/t MI; use of nitrates or ECG with pathological Q waves): No (+0)  3. H/o congestive heart failure (Pulmonary edema, bilateral rales or S3 gallop; PND; CXR with pulmonary vascular redistribution): No (+0)  4.  History of cerebrovascular disease (Prior TIA or stroke): No (+0)  5. Pre-operative treatment with insulin: No (+0)  6. Pre-operative creatinine >2 mg/dL / 176.8 µmol/L: No (+0)  TOTAL = 0    Hypertension, hyperlipidemia  Hold off home antihypertensive in setting of acute blood loss anemia-monitor BP and adjust as necessary  Continue patient's home simvastatin 20 mg nightly    Peripheral neuropathy  Continue gabapentin 300 mg 4 times daily    Code Status: Full Code                 DVT ppx: SCDs, defer chemoprophylaxis given acute hematuria with acute blood loss anemia     Please see orders for more complete plan    Ankita Hernandez PA-C         [1] cefTRIAXone, 1 g, intravenous, q24h  cyanocobalamin, 1,000 mcg, oral, Daily  folic acid, 1 mg, oral, Daily  gabapentin, 300 mg, oral, 4x daily  oxyBUTYnin, 5 mg, oral, BID  pantoprazole, 40 mg, oral, Daily before breakfast   Or  pantoprazole, 40 mg, intravenous, Daily before breakfast  polyethylene glycol, 17 g, oral, Daily  simvastatin, 20 mg, oral, Nightly  tamsulosin, 0.4 mg, oral, Daily    [2] sodium chloride, 3,000 mL    [3] PRN medications: bisacodyl, bisacodyl, guaiFENesin, HYDROcodone-acetaminophen, melatonin, morphine, ondansetron ODT **OR** ondansetron

## 2025-07-28 NOTE — CARE PLAN
The patient's goals for the shift include  Patient will continue to tolerate irrigation well and will remain pain free    The clinical goals for the shift include pt zahra have no pain duiring the rest of my shift      Problem: Pain - Adult  Goal: Verbalizes/displays adequate comfort level or baseline comfort level  Outcome: Progressing     Problem: Safety - Adult  Goal: Free from fall injury  Outcome: Progressing     Problem: Discharge Planning  Goal: Discharge to home or other facility with appropriate resources  Outcome: Progressing     Problem: Chronic Conditions and Co-morbidities  Goal: Patient's chronic conditions and co-morbidity symptoms are monitored and maintained or improved  Outcome: Progressing     Problem: Nutrition  Goal: Nutrient intake appropriate for maintaining nutritional needs  Outcome: Progressing     Problem: Musculoskeletal - Adult  Goal: Return ADL status to a safe level of function  Outcome: Progressing     Problem: Infection - Adult  Goal: Absence of infection during hospitalization  Outcome: Progressing

## 2025-07-28 NOTE — PROGRESS NOTES
7/28/25 1508   07/28/25 1507   Discharge Planning   Home or Post Acute Services In home services   Type of Home Care Services Home OT;Home PT   Expected Discharge Disposition Home H   Patient Choice   Provider Choice list and CMS website (https://medicare.gov/care-compare#search) for post-acute Quality and Resource Measure Data were provided and reviewed with: Patient;Family   Patient / Family choosing to utilize agency / facility established prior to hospitalization No   Intensity of Service   Intensity of Service 0-30 min     Followed up with pt and pt daughter about HHC vs SNF. Discussed that pt did well with therapy and is being recommended for home with HHC. Daughter agreeable to both HHC and SNF list. Provided both lists. Daughter will review for HHC choices and requested follow up for tomorrow.   Loli Reyez RN, BSN, Axel/ Juanpablo DIXON Supervisor

## 2025-07-29 ENCOUNTER — APPOINTMENT (OUTPATIENT)
Dept: CARDIOLOGY | Facility: HOSPITAL | Age: 80
DRG: 698 | End: 2025-07-29
Payer: MEDICARE

## 2025-07-29 ENCOUNTER — SURGERY (OUTPATIENT)
Age: 80
End: 2025-07-29
Payer: MEDICARE

## 2025-07-29 ENCOUNTER — ANESTHESIA (OUTPATIENT)
Dept: OPERATING ROOM | Facility: HOSPITAL | Age: 80
DRG: 698 | End: 2025-07-29
Payer: MEDICARE

## 2025-07-29 PROBLEM — D64.9 ANEMIA: Status: ACTIVE | Noted: 2025-07-29

## 2025-07-29 LAB
ANION GAP SERPL CALC-SCNC: 8 MMOL/L (ref 10–20)
ATRIAL RATE: 57 BPM
BUN SERPL-MCNC: 20 MG/DL (ref 6–23)
CALCIUM SERPL-MCNC: 8.1 MG/DL (ref 8.6–10.3)
CHLORIDE SERPL-SCNC: 109 MMOL/L (ref 98–107)
CO2 SERPL-SCNC: 27 MMOL/L (ref 21–32)
CREAT SERPL-MCNC: 0.87 MG/DL (ref 0.5–1.3)
EGFRCR SERPLBLD CKD-EPI 2021: 86 ML/MIN/1.73M*2
ERYTHROCYTE [DISTWIDTH] IN BLOOD BY AUTOMATED COUNT: 14.3 % (ref 11.5–14.5)
GLUCOSE SERPL-MCNC: 90 MG/DL (ref 74–99)
HCT VFR BLD AUTO: 28.7 % (ref 41–52)
HGB BLD-MCNC: 9.4 G/DL (ref 13.5–17.5)
MAGNESIUM SERPL-MCNC: 2.16 MG/DL (ref 1.6–2.4)
MCH RBC QN AUTO: 30.3 PG (ref 26–34)
MCHC RBC AUTO-ENTMCNC: 32.8 G/DL (ref 32–36)
MCV RBC AUTO: 93 FL (ref 80–100)
NRBC BLD-RTO: 0.2 /100 WBCS (ref 0–0)
P AXIS: 8 DEGREES
PLATELET # BLD AUTO: 292 X10*3/UL (ref 150–450)
POTASSIUM SERPL-SCNC: 4.4 MMOL/L (ref 3.5–5.3)
PR INTERVAL: 132 MS
Q ONSET: 257 MS
QRS COUNT: 10 BEATS
QRS DURATION: 101 MS
QT INTERVAL: 403 MS
QTC CALCULATION(BAZETT): 393 MS
QTC FREDERICIA: 396 MS
R AXIS: -26 DEGREES
RBC # BLD AUTO: 3.1 X10*6/UL (ref 4.5–5.9)
SODIUM SERPL-SCNC: 140 MMOL/L (ref 136–145)
T AXIS: 41 DEGREES
T OFFSET: 458 MS
VENTRICULAR RATE: 57 BPM
WBC # BLD AUTO: 12.7 X10*3/UL (ref 4.4–11.3)

## 2025-07-29 PROCEDURE — 2500000001 HC RX 250 WO HCPCS SELF ADMINISTERED DRUGS (ALT 637 FOR MEDICARE OP): Performed by: UROLOGY

## 2025-07-29 PROCEDURE — 3600000008 HC OR TIME - EACH INCREMENTAL 1 MINUTE - PROCEDURE LEVEL THREE: Performed by: UROLOGY

## 2025-07-29 PROCEDURE — 2500000002 HC RX 250 W HCPCS SELF ADMINISTERED DRUGS (ALT 637 FOR MEDICARE OP, ALT 636 FOR OP/ED): Performed by: UROLOGY

## 2025-07-29 PROCEDURE — 7100000001 HC RECOVERY ROOM TIME - INITIAL BASE CHARGE: Performed by: UROLOGY

## 2025-07-29 PROCEDURE — 99233 SBSQ HOSP IP/OBS HIGH 50: CPT | Performed by: PHYSICIAN ASSISTANT

## 2025-07-29 PROCEDURE — 1210000001 HC SEMI-PRIVATE ROOM DAILY

## 2025-07-29 PROCEDURE — 3700000001 HC GENERAL ANESTHESIA TIME - INITIAL BASE CHARGE: Performed by: UROLOGY

## 2025-07-29 PROCEDURE — 2500000001 HC RX 250 WO HCPCS SELF ADMINISTERED DRUGS (ALT 637 FOR MEDICARE OP): Performed by: NURSE PRACTITIONER

## 2025-07-29 PROCEDURE — 2500000004 HC RX 250 GENERAL PHARMACY W/ HCPCS (ALT 636 FOR OP/ED): Performed by: UROLOGY

## 2025-07-29 PROCEDURE — 2500000004 HC RX 250 GENERAL PHARMACY W/ HCPCS (ALT 636 FOR OP/ED): Performed by: ANESTHESIOLOGY

## 2025-07-29 PROCEDURE — 36415 COLL VENOUS BLD VENIPUNCTURE: CPT | Performed by: PHYSICIAN ASSISTANT

## 2025-07-29 PROCEDURE — 2500000004 HC RX 250 GENERAL PHARMACY W/ HCPCS (ALT 636 FOR OP/ED): Mod: JW | Performed by: NURSE ANESTHETIST, CERTIFIED REGISTERED

## 2025-07-29 PROCEDURE — 2720000007 HC OR 272 NO HCPCS: Performed by: UROLOGY

## 2025-07-29 PROCEDURE — 83735 ASSAY OF MAGNESIUM: CPT | Performed by: PHYSICIAN ASSISTANT

## 2025-07-29 PROCEDURE — 3700000002 HC GENERAL ANESTHESIA TIME - EACH INCREMENTAL 1 MINUTE: Performed by: UROLOGY

## 2025-07-29 PROCEDURE — 0TCB8ZZ EXTIRPATION OF MATTER FROM BLADDER, VIA NATURAL OR ARTIFICIAL OPENING ENDOSCOPIC: ICD-10-PCS | Performed by: UROLOGY

## 2025-07-29 PROCEDURE — 85027 COMPLETE CBC AUTOMATED: CPT | Performed by: PHYSICIAN ASSISTANT

## 2025-07-29 PROCEDURE — 93010 ELECTROCARDIOGRAM REPORT: CPT | Performed by: INTERNAL MEDICINE

## 2025-07-29 PROCEDURE — 52356 CYSTO/URETERO W/LITHOTRIPSY: CPT | Performed by: UROLOGY

## 2025-07-29 PROCEDURE — 2500000002 HC RX 250 W HCPCS SELF ADMINISTERED DRUGS (ALT 637 FOR MEDICARE OP, ALT 636 FOR OP/ED)

## 2025-07-29 PROCEDURE — 80048 BASIC METABOLIC PNL TOTAL CA: CPT | Performed by: PHYSICIAN ASSISTANT

## 2025-07-29 PROCEDURE — 2500000005 HC RX 250 GENERAL PHARMACY W/O HCPCS: Performed by: NURSE ANESTHETIST, CERTIFIED REGISTERED

## 2025-07-29 PROCEDURE — 2500000002 HC RX 250 W HCPCS SELF ADMINISTERED DRUGS (ALT 637 FOR MEDICARE OP, ALT 636 FOR OP/ED): Performed by: NURSE PRACTITIONER

## 2025-07-29 PROCEDURE — 7100000002 HC RECOVERY ROOM TIME - EACH INCREMENTAL 1 MINUTE: Performed by: UROLOGY

## 2025-07-29 PROCEDURE — 2500000005 HC RX 250 GENERAL PHARMACY W/O HCPCS: Performed by: UROLOGY

## 2025-07-29 PROCEDURE — 93005 ELECTROCARDIOGRAM TRACING: CPT

## 2025-07-29 PROCEDURE — 87070 CULTURE OTHR SPECIMN AEROBIC: CPT | Mod: PORLAB | Performed by: UROLOGY

## 2025-07-29 PROCEDURE — 3600000003 HC OR TIME - INITIAL BASE CHARGE - PROCEDURE LEVEL THREE: Performed by: UROLOGY

## 2025-07-29 RX ORDER — FAMOTIDINE 10 MG/ML
20 INJECTION, SOLUTION INTRAVENOUS ONCE
Status: DISCONTINUED | OUTPATIENT
Start: 2025-07-29 | End: 2025-07-29 | Stop reason: SDUPTHER

## 2025-07-29 RX ORDER — SODIUM CHLORIDE, SODIUM LACTATE, POTASSIUM CHLORIDE, CALCIUM CHLORIDE 600; 310; 30; 20 MG/100ML; MG/100ML; MG/100ML; MG/100ML
75 INJECTION, SOLUTION INTRAVENOUS CONTINUOUS
Status: DISCONTINUED | OUTPATIENT
Start: 2025-07-29 | End: 2025-07-29

## 2025-07-29 RX ORDER — ONDANSETRON HYDROCHLORIDE 2 MG/ML
4 INJECTION, SOLUTION INTRAVENOUS ONCE AS NEEDED
Status: DISCONTINUED | OUTPATIENT
Start: 2025-07-29 | End: 2025-07-29 | Stop reason: HOSPADM

## 2025-07-29 RX ORDER — ACETAMINOPHEN 10 MG/ML
1000 INJECTION, SOLUTION INTRAVENOUS ONCE
Status: COMPLETED | OUTPATIENT
Start: 2025-07-29 | End: 2025-07-29

## 2025-07-29 RX ORDER — CEFAZOLIN SODIUM 2 G/50ML
2 SOLUTION INTRAVENOUS ONCE
Status: COMPLETED | OUTPATIENT
Start: 2025-07-29 | End: 2025-07-29

## 2025-07-29 RX ORDER — SODIUM CHLORIDE, SODIUM LACTATE, POTASSIUM CHLORIDE, CALCIUM CHLORIDE 600; 310; 30; 20 MG/100ML; MG/100ML; MG/100ML; MG/100ML
100 INJECTION, SOLUTION INTRAVENOUS CONTINUOUS
Status: DISCONTINUED | OUTPATIENT
Start: 2025-07-29 | End: 2025-07-29 | Stop reason: HOSPADM

## 2025-07-29 RX ORDER — FAMOTIDINE 10 MG/ML
20 INJECTION, SOLUTION INTRAVENOUS ONCE
Status: COMPLETED | OUTPATIENT
Start: 2025-07-29 | End: 2025-07-29

## 2025-07-29 RX ORDER — DIPHENHYDRAMINE HYDROCHLORIDE 50 MG/ML
12.5 INJECTION, SOLUTION INTRAMUSCULAR; INTRAVENOUS ONCE AS NEEDED
Status: DISCONTINUED | OUTPATIENT
Start: 2025-07-29 | End: 2025-07-29 | Stop reason: HOSPADM

## 2025-07-29 RX ORDER — LIDOCAINE HCL/PF 100 MG/5ML
SYRINGE (ML) INTRAVENOUS AS NEEDED
Status: DISCONTINUED | OUTPATIENT
Start: 2025-07-29 | End: 2025-07-29

## 2025-07-29 RX ORDER — HYDRALAZINE HYDROCHLORIDE 20 MG/ML
5 INJECTION INTRAMUSCULAR; INTRAVENOUS EVERY 30 MIN PRN
Status: DISCONTINUED | OUTPATIENT
Start: 2025-07-29 | End: 2025-07-29 | Stop reason: HOSPADM

## 2025-07-29 RX ORDER — LABETALOL HYDROCHLORIDE 5 MG/ML
5 INJECTION, SOLUTION INTRAVENOUS ONCE AS NEEDED
Status: DISCONTINUED | OUTPATIENT
Start: 2025-07-29 | End: 2025-07-29 | Stop reason: HOSPADM

## 2025-07-29 RX ORDER — SODIUM CHLORIDE 9 MG/ML
125 INJECTION, SOLUTION INTRAVENOUS CONTINUOUS
Status: DISCONTINUED | OUTPATIENT
Start: 2025-07-29 | End: 2025-07-29

## 2025-07-29 RX ORDER — ALBUTEROL SULFATE 0.83 MG/ML
2.5 SOLUTION RESPIRATORY (INHALATION) ONCE AS NEEDED
Status: DISCONTINUED | OUTPATIENT
Start: 2025-07-29 | End: 2025-07-29 | Stop reason: HOSPADM

## 2025-07-29 RX ORDER — MORPHINE SULFATE 2 MG/ML
2 INJECTION, SOLUTION INTRAMUSCULAR; INTRAVENOUS EVERY 5 MIN PRN
Status: DISCONTINUED | OUTPATIENT
Start: 2025-07-29 | End: 2025-07-29 | Stop reason: HOSPADM

## 2025-07-29 RX ORDER — LIDOCAINE HYDROCHLORIDE 10 MG/ML
0.1 INJECTION, SOLUTION EPIDURAL; INFILTRATION; INTRACAUDAL; PERINEURAL ONCE
Status: DISCONTINUED | OUTPATIENT
Start: 2025-07-29 | End: 2025-07-29 | Stop reason: HOSPADM

## 2025-07-29 RX ORDER — ALBUTEROL SULFATE 0.83 MG/ML
2.5 SOLUTION RESPIRATORY (INHALATION) ONCE
Status: DISCONTINUED | OUTPATIENT
Start: 2025-07-29 | End: 2025-07-29 | Stop reason: HOSPADM

## 2025-07-29 RX ORDER — ONDANSETRON HYDROCHLORIDE 2 MG/ML
4 INJECTION, SOLUTION INTRAVENOUS ONCE
Status: COMPLETED | OUTPATIENT
Start: 2025-07-29 | End: 2025-07-29

## 2025-07-29 RX ORDER — MEPERIDINE HYDROCHLORIDE 25 MG/ML
12.5 INJECTION INTRAMUSCULAR; INTRAVENOUS; SUBCUTANEOUS EVERY 10 MIN PRN
Status: DISCONTINUED | OUTPATIENT
Start: 2025-07-29 | End: 2025-07-29 | Stop reason: HOSPADM

## 2025-07-29 RX ORDER — PROPOFOL 10 MG/ML
INJECTION, EMULSION INTRAVENOUS AS NEEDED
Status: DISCONTINUED | OUTPATIENT
Start: 2025-07-29 | End: 2025-07-29

## 2025-07-29 RX ORDER — OXYCODONE AND ACETAMINOPHEN 5; 325 MG/1; MG/1
1 TABLET ORAL EVERY 4 HOURS PRN
Status: DISCONTINUED | OUTPATIENT
Start: 2025-07-29 | End: 2025-07-29 | Stop reason: HOSPADM

## 2025-07-29 RX ORDER — DROPERIDOL 2.5 MG/ML
0.62 INJECTION, SOLUTION INTRAMUSCULAR; INTRAVENOUS ONCE AS NEEDED
Status: DISCONTINUED | OUTPATIENT
Start: 2025-07-29 | End: 2025-07-29 | Stop reason: HOSPADM

## 2025-07-29 RX ORDER — FENTANYL CITRATE 50 UG/ML
INJECTION, SOLUTION INTRAMUSCULAR; INTRAVENOUS AS NEEDED
Status: DISCONTINUED | OUTPATIENT
Start: 2025-07-29 | End: 2025-07-29

## 2025-07-29 RX ORDER — NORETHINDRONE AND ETHINYL ESTRADIOL 0.5-0.035
KIT ORAL AS NEEDED
Status: DISCONTINUED | OUTPATIENT
Start: 2025-07-29 | End: 2025-07-29

## 2025-07-29 RX ORDER — WATER 1 ML/ML
INJECTION IRRIGATION AS NEEDED
Status: DISCONTINUED | OUTPATIENT
Start: 2025-07-29 | End: 2025-07-29 | Stop reason: HOSPADM

## 2025-07-29 RX ORDER — METOCLOPRAMIDE HYDROCHLORIDE 5 MG/ML
5 INJECTION INTRAMUSCULAR; INTRAVENOUS ONCE
Status: COMPLETED | OUTPATIENT
Start: 2025-07-29 | End: 2025-07-29

## 2025-07-29 RX ADMIN — HYDROCODONE BITARTRATE AND ACETAMINOPHEN 1 TABLET: 5; 325 TABLET ORAL at 18:54

## 2025-07-29 RX ADMIN — EPHEDRINE SULFATE 10 MG: 50 INJECTION, SOLUTION INTRAVENOUS at 15:27

## 2025-07-29 RX ADMIN — FAMOTIDINE 20 MG: 10 INJECTION, SOLUTION INTRAVENOUS at 13:30

## 2025-07-29 RX ADMIN — SODIUM CHLORIDE 125 ML/HR: 0.9 INJECTION, SOLUTION INTRAVENOUS at 13:26

## 2025-07-29 RX ADMIN — ACETAMINOPHEN 1000 MG: 10 INJECTION INTRAVENOUS at 13:31

## 2025-07-29 RX ADMIN — OXYBUTYNIN CHLORIDE 5 MG: 5 TABLET ORAL at 08:45

## 2025-07-29 RX ADMIN — EPHEDRINE SULFATE 10 MG: 50 INJECTION, SOLUTION INTRAVENOUS at 15:30

## 2025-07-29 RX ADMIN — METOCLOPRAMIDE 5 MG: 5 INJECTION, SOLUTION INTRAMUSCULAR; INTRAVENOUS at 13:30

## 2025-07-29 RX ADMIN — CEFAZOLIN SODIUM 2 G: 2 SOLUTION INTRAVENOUS at 15:14

## 2025-07-29 RX ADMIN — GABAPENTIN 300 MG: 300 CAPSULE ORAL at 05:23

## 2025-07-29 RX ADMIN — DEXAMETHASONE SODIUM PHOSPHATE 4 MG: 4 INJECTION INTRA-ARTICULAR; INTRALESIONAL; INTRAMUSCULAR; INTRAVENOUS; SOFT TISSUE at 15:23

## 2025-07-29 RX ADMIN — GABAPENTIN 300 MG: 300 CAPSULE ORAL at 20:36

## 2025-07-29 RX ADMIN — TAMSULOSIN HYDROCHLORIDE 0.4 MG: 0.4 CAPSULE ORAL at 08:45

## 2025-07-29 RX ADMIN — WATER 14600 ML: 100 IRRIGANT IRRIGATION at 16:42

## 2025-07-29 RX ADMIN — SIMVASTATIN 20 MG: 20 TABLET, FILM COATED ORAL at 20:36

## 2025-07-29 RX ADMIN — FENTANYL CITRATE 50 MCG: 50 INJECTION INTRAMUSCULAR; INTRAVENOUS at 15:23

## 2025-07-29 RX ADMIN — OXYBUTYNIN CHLORIDE 5 MG: 5 TABLET ORAL at 20:36

## 2025-07-29 RX ADMIN — LIDOCAINE HYDROCHLORIDE 80 MG: 20 INJECTION, SOLUTION INTRAVENOUS at 15:23

## 2025-07-29 RX ADMIN — PROPOFOL 150 MG: 10 INJECTION, EMULSION INTRAVENOUS at 15:23

## 2025-07-29 RX ADMIN — ONDANSETRON 4 MG: 2 INJECTION, SOLUTION INTRAMUSCULAR; INTRAVENOUS at 13:30

## 2025-07-29 RX ADMIN — WATER 1000 ML: 100 IRRIGANT IRRIGATION at 15:08

## 2025-07-29 RX ADMIN — PANTOPRAZOLE SODIUM 40 MG: 40 TABLET, DELAYED RELEASE ORAL at 05:23

## 2025-07-29 SDOH — HEALTH STABILITY: MENTAL HEALTH: CURRENT SMOKER: 0

## 2025-07-29 ASSESSMENT — PAIN - FUNCTIONAL ASSESSMENT
PAIN_FUNCTIONAL_ASSESSMENT: 0-10

## 2025-07-29 ASSESSMENT — ENCOUNTER SYMPTOMS
EYE PAIN: 0
SORE THROAT: 0
DIARRHEA: 0
FREQUENCY: 0
DIAPHORESIS: 0
JOINT SWELLING: 0
FEVER: 0
FATIGUE: 0
BACK PAIN: 0
HALLUCINATIONS: 0
HEMATURIA: 0
WEAKNESS: 0
COUGH: 0
FLANK PAIN: 0
NUMBNESS: 0
NAUSEA: 0
CONSTIPATION: 0
PALPITATIONS: 0
SHORTNESS OF BREATH: 1
BRUISES/BLEEDS EASILY: 0
HEADACHES: 0
WOUND: 0
VOMITING: 0
LIGHT-HEADEDNESS: 1
CHILLS: 0
ABDOMINAL PAIN: 0
APPETITE CHANGE: 0
DIZZINESS: 0
CHEST TIGHTNESS: 0
WHEEZING: 0
BLOOD IN STOOL: 0
FACIAL SWELLING: 0
TROUBLE SWALLOWING: 0
DYSURIA: 0

## 2025-07-29 ASSESSMENT — PAIN SCALES - GENERAL
PAINLEVEL_OUTOF10: 3
PAINLEVEL_OUTOF10: 5 - MODERATE PAIN
PAINLEVEL_OUTOF10: 3
PAINLEVEL_OUTOF10: 0 - NO PAIN
PAINLEVEL_OUTOF10: 2
PAINLEVEL_OUTOF10: 0 - NO PAIN
PAIN_LEVEL: 3
PAINLEVEL_OUTOF10: 0 - NO PAIN

## 2025-07-29 ASSESSMENT — COGNITIVE AND FUNCTIONAL STATUS - GENERAL
STANDING UP FROM CHAIR USING ARMS: A LITTLE
MOBILITY SCORE: 21
WALKING IN HOSPITAL ROOM: A LITTLE
STANDING UP FROM CHAIR USING ARMS: A LITTLE
CLIMB 3 TO 5 STEPS WITH RAILING: A LITTLE
DAILY ACTIVITIY SCORE: 24
CLIMB 3 TO 5 STEPS WITH RAILING: A LITTLE
WALKING IN HOSPITAL ROOM: A LITTLE

## 2025-07-29 ASSESSMENT — COLUMBIA-SUICIDE SEVERITY RATING SCALE - C-SSRS
2. HAVE YOU ACTUALLY HAD ANY THOUGHTS OF KILLING YOURSELF?: NO
1. IN THE PAST MONTH, HAVE YOU WISHED YOU WERE DEAD OR WISHED YOU COULD GO TO SLEEP AND NOT WAKE UP?: NO
6. HAVE YOU EVER DONE ANYTHING, STARTED TO DO ANYTHING, OR PREPARED TO DO ANYTHING TO END YOUR LIFE?: NO

## 2025-07-29 ASSESSMENT — PAIN DESCRIPTION - DESCRIPTORS: DESCRIPTORS: SHARP

## 2025-07-29 ASSESSMENT — PAIN DESCRIPTION - LOCATION: LOCATION: PENIS

## 2025-07-29 NOTE — ANESTHESIA POSTPROCEDURE EVALUATION
Patient: Benjamin Mae    Procedure Summary       Date: 07/29/25 Room / Location: [facility] OR 05 / Virtual [facility] OR    Anesthesia Start: 1509 Anesthesia Stop: 1700    Procedure: cystoscopy, cystolithotripsy (Pelvis) Diagnosis:       Gross hematuria      (Gross hematuria [R31.0])    Surgeons: Kumar Enriquez MD Responsible Provider: RE Garcia    Anesthesia Type: general ASA Status: 3            Anesthesia Type: general    Vitals Value Taken Time   /62 07/29/25 17:20   Temp 36.2 °C (97.1 °F) 07/29/25 16:55   Pulse 63 07/29/25 17:23   Resp 21 07/29/25 17:23   SpO2 94 % 07/29/25 17:23   Vitals shown include unfiled device data.    Anesthesia Post Evaluation    Patient location during evaluation: PACU  Patient participation: complete - patient participated  Level of consciousness: sleepy but conscious and awake  Pain score: 3  Pain management: satisfactory to patient  Airway patency: patent  Cardiovascular status: hemodynamically stable  Respiratory status: room air  Hydration status: acceptable  Postoperative Nausea and Vomiting: none        There were no known notable events for this encounter.

## 2025-07-29 NOTE — ANESTHESIA PROCEDURE NOTES
Airway  Date/Time: 7/29/2025 3:23 PM  Reason: elective    Airway not difficult    Staffing  Performed: CRNA   Authorized by: JOANIE Garcia-CALDERON    Performed by: JOANIE Garcia-CALDERON  Patient location during procedure: OR    Patient Condition  Indications for airway management: anesthesia  MILS maintained throughout  Sedation level: deep     Final Airway Details   Preoxygenated: yes  Final airway type: supraglottic airway  Successful airway: classic  Size: 4 (igel)  Number of attempts at approach: 1

## 2025-07-29 NOTE — PROGRESS NOTES
Physical Therapy                 Therapy Communication Note    Patient Name: Benjamin Mae  MRN: [id number]  Department: [facility] E  Room: [address] [place] [address]  Today's Date: 7/29/2025     Discipline: Physical Therapy    Missed Visit:       Missed Visit Reason: Missed Visit Reason: Patient refused (patient declined stated he was up already and is going for surgery. checked not going until 3 pm. patient still declined)    Missed Time: Attempt    Comment:

## 2025-07-29 NOTE — PROGRESS NOTES
07/29/25 1200   Discharge Planning   Expected Discharge Disposition Home     1022 called the pt this morning and the pt wanted me to call back when his daughter in law was there. 1245 called Nicole the daughter in law as the room phone was busy and they have decided that [facility] was not what they were interested in at this time.  Plan is now return home no new needs at this time. CT to follow. Masha CABRERAN/RN-TCC

## 2025-07-29 NOTE — OP NOTE
cystoscopy, cystolithotripsy Operative Note     Date: 2025 - 2025  OR Location: [facility] OR    Name: Benjamin Mae, : 1945, Age: 80 y.o., MRN: [id number], Sex: male    Diagnosis  Pre-op Diagnosis      * Gross hematuria [R31.0] Bladder stone, multiple; BPH     Procedures  Cystoscopy, bladder stone lithotripsy    Surgeons      * Kumar Enriquez - Primary    Resident/Fellow/Other Assistant:  Surgeons and Role:  * No surgeons found with a matching role *    Staff:   Wilmaulator: Essence Liu Person: Suleiman  Surgical Assistant: Whitney    Anesthesia Staff: CRNA: RE Garcia    Procedure Summary  Anesthesia: General  ASA: III  Estimated Blood Loss: 1mL  Intra-op Medications:   Administrations occurring from 1517 to 1647 on 25:   Medication Name Total Dose   dexAMETHasone (Decadron) 4 mg/mL IV Syringe 2 mL 4 mg   ePHEDrine injection 20 mg   fentaNYL (Sublimaze) injection 50 mcg/mL 50 mcg   lidocaine (cardiac) injection 2% prefilled syringe 80 mg   propofol (Diprivan) injection 10 mg/mL 150 mg              Anesthesia Record               Intraprocedure I/O Totals       None           Specimen: No specimens collected              Drains and/or Catheters:   Urethral Catheter Latex;Other (Comment) 22 Fr. (Active)       Tourniquet Times:         Implants:     Findings: BPH, severe trabeculated bladder, multiple stones up to 1 cm    Indications: Benjamin Mae is an 80 y.o. male who is having surgery for Gross hematuria [R31.0].  Gross hematuria, bladder stone    The patient was seen in the preoperative area. The risks, benefits, complications, treatment options, non-operative alternatives, expected recovery and outcomes were discussed with the patient. The possibilities of reaction to medication, pulmonary aspiration, injury to surrounding structures, bleeding, recurrent infection, the need for additional procedures, failure to diagnose a condition, and creating a complication requiring transfusion  or operation were discussed with the patient. The patient concurred with the proposed plan, giving informed consent.  The site of surgery was properly noted/marked if necessary per policy. The patient has been actively warmed in preoperative area. Preoperative antibiotics have been ordered and given within 1 hours of incision. Venous thrombosis prophylaxis have been ordered including bilateral sequential compression devices    Procedure Details: Patient was identified in the preoperative holding area and brought into the room, placed on supine position. After a general anesthesia was induced, patient was repositioned in a dorsal lithotomy position, genitalia area was prepped and draped in a routine standard fashion. A cystoscopy was performed with a 22F Olympus cystoscope, and the findings include normal anterior urethra, significant enlarged prostate severely trabeculated bladder, some old blood clots, multiple bladder stones up to 1 cm in diameter, no tumor .  At this point 1000 µm holmium laser fiber brought in, and a standard lithotripsy was performed, stone fragments evacuated.  A 22 [place] three-way Simplastic catheter was inserted, urine minimal pinkish.  Patient extubated and returned to PACU in a stable condition.  Evidence of Infection: No   Complications:  None; patient tolerated the procedure well.    Disposition: PACU - hemodynamically stable.  Condition: stable         Task Performed by RNFA or Surgical Assistant:            Plan  Continue postop medical management  Continue three-way Guadarrama catheter with third port plugged  Cherelle MICHEL home with Guadarrama catheter tomorrow if stable  Voiding trial in office in a week    Attending Attestation: I performed the procedure.    Kumar Enriquez  Phone Number: [phone]

## 2025-07-29 NOTE — CARE PLAN
The patient's goals for the shift include  Patient will remain hemodynamically stable    The clinical goals for the shift include Pt will remain HDS throughout shift    Problem: Pain - Adult  Goal: Verbalizes/displays adequate comfort level or baseline comfort level  Outcome: Progressing  Flowsheets (Taken 7/29/2025 0823)  Verbalizes/displays adequate comfort level or baseline comfort level:   Encourage patient to monitor pain and request assistance   Assess pain using appropriate pain scale   Administer analgesics based on type and severity of pain and evaluate response   Implement non-pharmacological measures as appropriate and evaluate response     Problem: Safety - Adult  Goal: Free from fall injury  Outcome: Progressing  Flowsheets (Taken 7/29/2025 0823)  Free from fall injury: Instruct family/caregiver on patient safety     Problem: Chronic Conditions and Co-morbidities  Goal: Patient's chronic conditions and co-morbidity symptoms are monitored and maintained or improved  Outcome: Progressing

## 2025-07-29 NOTE — CARE PLAN
The patient's goals for the shift include      The clinical goals for the shift include Pt will remain HDS throughout shift      Problem: Pain - Adult  Goal: Verbalizes/displays adequate comfort level or baseline comfort level  Outcome: Progressing     Problem: Safety - Adult  Goal: Free from fall injury  Outcome: Progressing     Problem: Discharge Planning  Goal: Discharge to home or other facility with appropriate resources  Outcome: Progressing     Problem: Chronic Conditions and Co-morbidities  Goal: Patient's chronic conditions and co-morbidity symptoms are monitored and maintained or improved  Outcome: Progressing     Problem: Nutrition  Goal: Nutrient intake appropriate for maintaining nutritional needs  Outcome: Progressing     Problem: Musculoskeletal - Adult  Goal: Return ADL status to a safe level of function  Outcome: Progressing     Problem: Infection - Adult  Goal: Absence of infection during hospitalization  Outcome: Progressing

## 2025-07-29 NOTE — ANESTHESIA PREPROCEDURE EVALUATION
Patient: Benjamin Mae    Procedure Information       Date/Time: 07/29/25 1517    Procedure: cystoscopy transuretheral resection bladder tumor (Pelvis) - cystoscopy transuretheral resection bladder tumor    Location: [facility] / [facility]    Surgeons: Kumar Enriquez MD            Relevant Problems   Cardiac   (+) Hypercholesterolemia      Neuro   (+) Peripheral neuropathy      /Renal   (+) Benign prostatic hyperplasia with urinary obstruction and other lower urinary tract symptoms      Hematology   (+) Anemia      Musculoskeletal   (+) Degeneration of intervertebral disc of lumbar region   (+) Degenerative lumbar spinal stenosis      Genitourinary   (+) Gross hematuria   (+) Hematuria, unspecified type       Clinical information reviewed:   Tobacco  Allergies  Meds   Med Hx  Surg Hx   Fam Hx  Soc Hx        NPO Detail:  NPO/Void Status  Date of Last Liquid: 07/29/25  Time of Last Liquid: 0000  Date of Last Solid: 07/29/25  Time of Last Solid: 0000         Physical Exam    Airway  Mallampati: II  TM distance: >3 FB  Neck ROM: full  Mouth opening: 3 or more finger widths     Cardiovascular    Dental    Pulmonary    Abdominal            Anesthesia Plan    History of general anesthesia?: yes  History of complications of general anesthesia?: no    ASA 3     general     The patient is not a current smoker.    Anesthetic plan and risks discussed with patient.

## 2025-07-29 NOTE — PROGRESS NOTES
Occupational Therapy                 Therapy Communication Note    Patient Name: Benjamin Mae  MRN: [id number]  Department: [facility] 2 E  Room: [address] [place] [address]  Today's Date: 7/29/2025     Discipline: Occupational Therapy    Missed Visit: OT Missed Visit: Yes     Missed Visit Reason: Missed Visit Reason: Patient refused (states he is going for surgery today. Was just up with nursing and relaxing. RN confirms surgery is scheduled for later today.)    Missed Time: Attempt    Comment:

## 2025-07-29 NOTE — PROGRESS NOTES
Benjamin Mae is a 80 y.o. male on day 6 of admission presenting with Hematuria, unspecified type.      Subjective   Benjamin Mae is a 80 y.o. 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  Patient had been seen in the emergency department over the weekend with complaint of hematuria.  A three-way catheter has been inserted patient was irrigated patient wanted to go home and follow-up with his urologist on Monday.  The patient had been discharged to home and returned again over the weekend to the emergency department with similar issues the Agarwal catheter was not draining and the patient could not get it to improve.  Had additional irrigation performed and followed up with urology on Monday.  His catheter was evaluated by urology and irrigated clots were removed patient was placed on Cipro.  This morning the patient returns to the hospital again with abdominal pain and his Agarwal catheter not draining.  Patient was seen Agarwal catheter irrigated set up with CBI patient now having ongoing hematuria.  He does complain of some chills as well as weakness unable to get up off the couch.  No severe abdominal pain after having received some pain medicine prior to arrival. CBC with WBC 20.6, Hgb 9.6, Plts 315. CMP with glucose 142, Na 135, K 3.9, BUN 53, sCr 1.46, alk phos 83, ALT 16, AST 14, bilirubin 0.6.Anemia studies were completed on day of arrival with a normal ferritin, normal iron of 39, TIBC slightly low at 221 with percent saturation slightly low at 18%. Urine culture from 7/23/2025 with no growth.  Blood cultures x 2 no growth x 4 days, final. Patient seen by urologist-continue CBI with manual irrigation as needed.  Oxybutynin added.  Patient to go to OR 7/29/25 for cystoscopy with possible biopsy and possible resection of bladder tumor. He has received 3u PRBC total so far this admission or  symptomatic anemia    7/29/2025: No acute events overnight. Vitals stable, afebrile.  CBC reviewed, hemoglobin is 9.4 s/p 1u PRBC yesterday. His baseline hgb appears to be within normal limits approximately 13.  Still has leukocytosis 12.7, completed IV rocephin yesterday  Patient feels much better in terms of dyspnea on exertion and lightheadedness today.       Rounded on patient on this date with RN     Review of Systems   Constitutional:  Negative for appetite change, chills, diaphoresis, fatigue and fever.   HENT:  Negative for congestion, ear pain, facial swelling, hearing loss, nosebleeds, sore throat, tinnitus and trouble swallowing.    Eyes:  Negative for pain.   Respiratory:  Positive for shortness of breath. Negative for cough, chest tightness and wheezing.    Cardiovascular:  Negative for chest pain, palpitations and leg swelling.   Gastrointestinal:  Negative for abdominal pain, blood in stool, constipation, diarrhea, nausea and vomiting.   Genitourinary:  Negative for dysuria, flank pain, frequency, hematuria and urgency.   Musculoskeletal:  Negative for back pain and joint swelling.   Skin:  Negative for rash and wound.   Neurological:  Positive for light-headedness. Negative for dizziness, syncope, weakness, numbness and headaches.   Hematological:  Does not bruise/bleed easily.   Psychiatric/Behavioral:  Negative for behavioral problems, hallucinations and suicidal ideas.           Objective     Last Recorded Vitals  /69 (BP Location: Left arm, Patient Position: Lying)   Pulse 55   Temp 36.4 °C (97.6 °F) (Temporal)   Resp 17   Wt 77.1 kg (170 lb)   SpO2 97%     Image Results  Imaging  No results found.    Cardiology, Vascular, and Other Imaging  No other imaging results found for the past 7 days       Lab Results  Results for orders placed or performed during the hospital encounter of 07/23/25 (from the past 24 hours)   Prepare RBC: 1 Units   Result Value Ref Range    PRODUCT CODE [id number]      Unit Number [id number]     Unit ABO O     Unit RH POS     XM INTEP COMP     Dispense Status TR     Blood Expiration Date 8/21/2025 11:59:00 PM EDT     PRODUCT BLOOD TYPE 5100     UNIT VOLUME 350    Type and screen   Result Value Ref Range    ABO TYPE O     Rh TYPE POS     ANTIBODY SCREEN NEG    Basic Metabolic Panel   Result Value Ref Range    Glucose 90 74 - 99 mg/dL    Sodium 140 136 - 145 mmol/L    Potassium 4.4 3.5 - 5.3 mmol/L    Chloride 109 (H) 98 - 107 mmol/L    Bicarbonate 27 21 - 32 mmol/L    Anion Gap 8 (L) 10 - 20 mmol/L    Urea Nitrogen 20 6 - 23 mg/dL    Creatinine 0.87 0.50 - 1.30 mg/dL    eGFR 86 >60 mL/min/1.73m*2    Calcium 8.1 (L) 8.6 - 10.3 mg/dL   CBC   Result Value Ref Range    WBC 12.7 (H) 4.4 - 11.3 x10*3/uL    nRBC 0.2 (H) 0.0 - 0.0 /100 WBCs    RBC 3.10 (L) 4.50 - 5.90 x10*6/uL    Hemoglobin 9.4 (L) 13.5 - 17.5 g/dL    Hematocrit 28.7 (L) 41.0 - 52.0 %    MCV 93 80 - 100 fL    MCH 30.3 26.0 - 34.0 pg    MCHC 32.8 32.0 - 36.0 g/dL    RDW 14.3 11.5 - 14.5 %    Platelets 292 150 - 450 x10*3/uL   Magnesium   Result Value Ref Range    Magnesium 2.16 1.60 - 2.40 mg/dL        Medications  Scheduled medications:  Scheduled Medications[1]  Continuous medications:  Continuous Medications[2]  PRN medications:  PRN Medications[3]     Physical Exam  Constitutional:       General: He is not in acute distress.     Appearance: Normal appearance.   HENT:      Head: Normocephalic and atraumatic.      Right Ear: External ear normal.      Left Ear: External ear normal.      Nose: Nose normal.      Mouth/Throat:      Mouth: Mucous membranes are moist.      Pharynx: Oropharynx is clear.     Eyes:      Extraocular Movements: Extraocular movements intact.      Conjunctiva/sclera: Conjunctivae normal.      Pupils: Pupils are equal, round, and reactive to light.       Cardiovascular:      Rate and Rhythm: Normal rate and regular rhythm.      Pulses: Normal pulses.      Heart sounds: Normal heart sounds.    Pulmonary:      Effort: Pulmonary effort is normal. No respiratory distress.      Breath sounds: Normal breath sounds. No wheezing, rhonchi or rales.   Abdominal:      General: Bowel sounds are normal.      Palpations: Abdomen is soft.      Tenderness: There is no abdominal tenderness. There is no right CVA tenderness, left CVA tenderness, guarding or rebound.   Genitourinary:     Comments: Guadarrama with CBI with clear light yellow urine    Musculoskeletal:         General: No swelling. Normal range of motion.      Cervical back: Normal range of motion and neck supple.     Skin:     General: Skin is warm and dry.      Capillary Refill: Capillary refill takes less than 2 seconds.      Findings: No rash.     Neurological:      General: No focal deficit present.      Mental Status: He is alert and oriented to person, place, and time. Mental status is at baseline.     Psychiatric:         Mood and Affect: Mood normal.         Behavior: Behavior normal.                  Assessment/Plan        This patient has a urinary catheter   Reason for the urinary catheter remaining today? urinary retention/bladder outlet obstruction, acute or chronic    CAUTI-POA  Urine culture from 7/23/2025 with no growth  Blood cultures x 2 no growth x 4 days, final  Note the patient was treated with Cipro prior to arrival   Completed 5d rocephin 7/28/25    BPH with obstructive uropathy with LUTS bladder spasm  Oxybutynin 5 mg 2 times daily, Flomax 0.4 mg daily    Gross hematuria  Acute blood loss anemia. symptomatic  Monitor H/H  Transfuse as needed-appears patient was given blood transfusion of 1 unit PRBC on 7/24/2025 and additional 1 unit PRBC on 7/25/2025 given patient was dizzy with ambulating leading to symptomatic anemia. Transfused additional 1 unit PRBC on 7/28/2025 given patient lightheaded and ERIC  CBI with three-way catheter-urine cleared to light yellow on 7/28/2025  Urology consulted- Patient seen by urologist-continue CBI with  manual irrigation as needed.  Oxybutynin added.    Supplement B12 and folate  Iron studies noted    Patient to go to OR 7/29/25 for cystoscopy with possible biopsy and possible resection of bladder tumor    Revised Cardiac Risk Index (RCRI):  1. Elevated-risk surgery (Intraperitoneal; intrathoracic; suprainguinal vascular): No (+0)  2. H/o ischemic heart disease (H/o MI; hx positive exercise test; current CP d/t MI; use of nitrates or ECG with pathological Q waves): No (+0)  3. H/o congestive heart failure (Pulmonary edema, bilateral rales or S3 gallop; PND; CXR with pulmonary vascular redistribution): No (+0)  4. History of cerebrovascular disease (Prior TIA or stroke): No (+0)  5. Pre-operative treatment with insulin: No (+0)  6. Pre-operative creatinine >2 mg/dL / 176.8 µmol/L: No (+0)  TOTAL = 0    Hypertension, hyperlipidemia  Hold off home antihypertensive in setting of acute blood loss anemia-monitor BP and adjust as necessary  Continue patient's home simvastatin 20 mg nightly    Peripheral neuropathy  Continue gabapentin 300 mg 4 times daily    Code Status: Full Code                 DVT ppx: SCDs, defer chemoprophylaxis given acute hematuria with acute blood loss anemia     Please see orders for more complete plan    Ankita Hernandez PA-C           [1] cyanocobalamin, 1,000 mcg, oral, Daily  folic acid, 1 mg, oral, Daily  gabapentin, 300 mg, oral, 4x daily  oxyBUTYnin, 5 mg, oral, BID  pantoprazole, 40 mg, oral, Daily before breakfast   Or  pantoprazole, 40 mg, intravenous, Daily before breakfast  polyethylene glycol, 17 g, oral, Daily  simvastatin, 20 mg, oral, Nightly  tamsulosin, 0.4 mg, oral, Daily     [2] sodium chloride, 3,000 mL     [3] PRN medications: bisacodyl, bisacodyl, guaiFENesin, HYDROcodone-acetaminophen, melatonin, morphine, ondansetron ODT **OR** ondansetron

## 2025-07-29 NOTE — PROGRESS NOTES
Music Therapy Note    Benjamin Mae was referred by Length of stay rounding    Therapy Session  Referral Type: New referral this admission (Length of stay rounds)  Visit Type: New visit  Session Start Time: 1142  Session End Time: 1145  Intervention Delivery: In-person  Conflict of Service: Declined treatment     Pre-assessment  Unable to Assess Reason: Outcomes not applicable         Treatment/Interventions  Music Therapy Interventions: Assessment    Post-assessment  Total Session Time (min): 3 minutes    Narrative  Assessment Detail: Pt. was sitting up in bed, alert and with appropriate affect  Plan: MT introduced music therapy services to pt. for during his stay to assist with coping, relaxation, and positive refocus  Evaluation: Pt. was appreciative of MT stopping by but stated he's good right now as he listens to music on the TV and watches sports. He chatted for a few with MT  Follow-up: No f/u needed as pt. declined; he is aware if he changes his mind MT will be happy to provide support    Education Documentation  Coping Strategies, taught by Amanda Mckeon at 7/29/2025  9:56 AM.  Learner: Patient  Readiness: Acceptance  Method: Explanation  Response: Demonstrated Understanding    Relaxation, taught by Amanda Mckeon at 7/29/2025  9:56 AM.  Learner: Patient  Readiness: Acceptance  Method: Explanation  Response: Demonstrated Understanding    Focal Points, taught by Amanda Mckeon at 7/29/2025  9:56 AM.  Learner: Patient  Readiness: Acceptance  Method: Explanation  Response: Demonstrated Understanding

## 2025-07-30 ENCOUNTER — PHARMACY VISIT (OUTPATIENT)
Dept: PHARMACY | Facility: CLINIC | Age: 80
End: 2025-07-30
Payer: COMMERCIAL

## 2025-07-30 VITALS
OXYGEN SATURATION: 94 % | HEIGHT: 69 IN | DIASTOLIC BLOOD PRESSURE: 66 MMHG | HEART RATE: 71 BPM | WEIGHT: 170 LBS | TEMPERATURE: 97.7 F | RESPIRATION RATE: 17 BRPM | SYSTOLIC BLOOD PRESSURE: 133 MMHG | BODY MASS INDEX: 25.18 KG/M2

## 2025-07-30 LAB
ANION GAP SERPL CALC-SCNC: 12 MMOL/L (ref 10–20)
BUN SERPL-MCNC: 21 MG/DL (ref 6–23)
CALCIUM SERPL-MCNC: 7.9 MG/DL (ref 8.6–10.3)
CHLORIDE SERPL-SCNC: 105 MMOL/L (ref 98–107)
CO2 SERPL-SCNC: 24 MMOL/L (ref 21–32)
CREAT SERPL-MCNC: 0.87 MG/DL (ref 0.5–1.3)
EGFRCR SERPLBLD CKD-EPI 2021: 86 ML/MIN/1.73M*2
ERYTHROCYTE [DISTWIDTH] IN BLOOD BY AUTOMATED COUNT: 14.4 % (ref 11.5–14.5)
GLUCOSE SERPL-MCNC: 128 MG/DL (ref 74–99)
HCT VFR BLD AUTO: 28.3 % (ref 41–52)
HGB BLD-MCNC: 9.3 G/DL (ref 13.5–17.5)
MAGNESIUM SERPL-MCNC: 2.08 MG/DL (ref 1.6–2.4)
MCH RBC QN AUTO: 30.6 PG (ref 26–34)
MCHC RBC AUTO-ENTMCNC: 32.9 G/DL (ref 32–36)
MCV RBC AUTO: 93 FL (ref 80–100)
NRBC BLD-RTO: 0 /100 WBCS (ref 0–0)
PLATELET # BLD AUTO: 304 X10*3/UL (ref 150–450)
POTASSIUM SERPL-SCNC: 4.6 MMOL/L (ref 3.5–5.3)
RBC # BLD AUTO: 3.04 X10*6/UL (ref 4.5–5.9)
SODIUM SERPL-SCNC: 136 MMOL/L (ref 136–145)
WBC # BLD AUTO: 16.8 X10*3/UL (ref 4.4–11.3)

## 2025-07-30 PROCEDURE — 2500000004 HC RX 250 GENERAL PHARMACY W/ HCPCS (ALT 636 FOR OP/ED): Performed by: UROLOGY

## 2025-07-30 PROCEDURE — 85027 COMPLETE CBC AUTOMATED: CPT | Performed by: UROLOGY

## 2025-07-30 PROCEDURE — 2500000002 HC RX 250 W HCPCS SELF ADMINISTERED DRUGS (ALT 637 FOR MEDICARE OP, ALT 636 FOR OP/ED): Performed by: UROLOGY

## 2025-07-30 PROCEDURE — RXMED WILLOW AMBULATORY MEDICATION CHARGE

## 2025-07-30 PROCEDURE — 36415 COLL VENOUS BLD VENIPUNCTURE: CPT | Performed by: UROLOGY

## 2025-07-30 PROCEDURE — 99239 HOSP IP/OBS DSCHRG MGMT >30: CPT | Performed by: PHYSICIAN ASSISTANT

## 2025-07-30 PROCEDURE — 2500000001 HC RX 250 WO HCPCS SELF ADMINISTERED DRUGS (ALT 637 FOR MEDICARE OP): Performed by: UROLOGY

## 2025-07-30 PROCEDURE — 80048 BASIC METABOLIC PNL TOTAL CA: CPT | Performed by: UROLOGY

## 2025-07-30 PROCEDURE — 83735 ASSAY OF MAGNESIUM: CPT | Performed by: UROLOGY

## 2025-07-30 RX ORDER — FOLIC ACID 1 MG/1
1 TABLET ORAL DAILY
Qty: 30 TABLET | Refills: 11 | Status: SHIPPED | OUTPATIENT
Start: 2025-07-31

## 2025-07-30 RX ORDER — CIPROFLOXACIN 500 MG/1
500 TABLET, FILM COATED ORAL 2 TIMES DAILY
Qty: 14 TABLET | Refills: 0 | Status: SHIPPED | OUTPATIENT
Start: 2025-07-30 | End: 2025-08-06

## 2025-07-30 RX ORDER — LANOLIN ALCOHOL/MO/W.PET/CERES
1000 CREAM (GRAM) TOPICAL DAILY
Qty: 30 TABLET | Refills: 0 | Status: SHIPPED | OUTPATIENT
Start: 2025-07-31 | End: 2025-08-30

## 2025-07-30 RX ORDER — POLYETHYLENE GLYCOL 3350 17 G/17G
17 POWDER, FOR SOLUTION ORAL DAILY
Qty: 510 G | Refills: 0 | Status: SHIPPED | OUTPATIENT
Start: 2025-07-31

## 2025-07-30 RX ORDER — OXYBUTYNIN CHLORIDE 5 MG/1
5 TABLET ORAL 2 TIMES DAILY
Qty: 60 TABLET | Refills: 0 | Status: SHIPPED | OUTPATIENT
Start: 2025-07-30 | End: 2025-08-29

## 2025-07-30 RX ADMIN — FOLIC ACID 1 MG: 1 TABLET ORAL at 07:42

## 2025-07-30 RX ADMIN — OXYBUTYNIN CHLORIDE 5 MG: 5 TABLET ORAL at 07:42

## 2025-07-30 RX ADMIN — Medication 1000 MCG: at 07:42

## 2025-07-30 RX ADMIN — TAMSULOSIN HYDROCHLORIDE 0.4 MG: 0.4 CAPSULE ORAL at 07:42

## 2025-07-30 RX ADMIN — GABAPENTIN 300 MG: 300 CAPSULE ORAL at 06:08

## 2025-07-30 RX ADMIN — PANTOPRAZOLE SODIUM 40 MG: 40 TABLET, DELAYED RELEASE ORAL at 06:08

## 2025-07-30 RX ADMIN — POLYETHYLENE GLYCOL 3350 17 G: 17 POWDER, FOR SOLUTION ORAL at 07:42

## 2025-07-30 ASSESSMENT — ENCOUNTER SYMPTOMS
BRUISES/BLEEDS EASILY: 0
DIAPHORESIS: 0
HEADACHES: 0
APPETITE CHANGE: 0
CONSTIPATION: 0
DIZZINESS: 0
SHORTNESS OF BREATH: 1
FREQUENCY: 0
NUMBNESS: 0
COUGH: 0
TROUBLE SWALLOWING: 0
PALPITATIONS: 0
NAUSEA: 0
HEMATURIA: 0
WEAKNESS: 0
BACK PAIN: 0
FATIGUE: 0
SORE THROAT: 0
BLOOD IN STOOL: 0
FEVER: 0
LIGHT-HEADEDNESS: 1
EYE PAIN: 0
WOUND: 0
HALLUCINATIONS: 0
VOMITING: 0
JOINT SWELLING: 0
CHEST TIGHTNESS: 0
DYSURIA: 0
ABDOMINAL PAIN: 0
FLANK PAIN: 0
DIARRHEA: 0
CHILLS: 0
FACIAL SWELLING: 0
WHEEZING: 0

## 2025-07-30 ASSESSMENT — COGNITIVE AND FUNCTIONAL STATUS - GENERAL
MOBILITY SCORE: 19
MOVING TO AND FROM BED TO CHAIR: A LITTLE
TOILETING: A LITTLE
CLIMB 3 TO 5 STEPS WITH RAILING: A LITTLE
DAILY ACTIVITIY SCORE: 20
HELP NEEDED FOR BATHING: A LITTLE
STANDING UP FROM CHAIR USING ARMS: A LITTLE
TURNING FROM BACK TO SIDE WHILE IN FLAT BAD: A LITTLE
DRESSING REGULAR LOWER BODY CLOTHING: A LITTLE
WALKING IN HOSPITAL ROOM: A LITTLE
PERSONAL GROOMING: A LITTLE

## 2025-07-30 NOTE — DISCHARGE SUMMARY
Admission Date: 7/23/2025 10:22 AM  Discharge Date: 7/30/2025   Condition at discharge: Fair    Discharge Diagnosis  CAUTI-POA-Rocephin 1 g daily  BPH with obstructive uropathy with LUTS and bladder spasm  Gross hematuria  Acute blood loss anemia, symptomatic  Hypertension, hyperlipidemia  Peripheral neuropathy  Code Status: Full Code              Test Results Pending At Discharge  Pending Labs       Order Current Status    Tissue/Wound Culture/Smear Preliminary result            Hospital Course   Benjamin Mae is a 80 y.o. 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  Patient had been seen in the emergency department over the weekend with complaint of hematuria.  A three-way catheter has been inserted patient was irrigated patient wanted to go home and follow-up with his urologist on Monday.  The patient had been discharged to home and returned again over the weekend to the emergency department with similar issues the Agarwal catheter was not draining and the patient could not get it to improve.  Had additional irrigation performed and followed up with urology on Monday.  His catheter was evaluated by urology and irrigated clots were removed patient was placed on Cipro.  This morning the patient returns to the hospital again with abdominal pain and his Agarwal catheter not draining.  Patient was seen Agarwal catheter irrigated set up with CBI patient now having ongoing hematuria.  He does complain of some chills as well as weakness unable to get up off the couch.  No severe abdominal pain after having received some pain medicine prior to arrival. CBC with WBC 20.6, Hgb 9.6, Plts 315. CMP with glucose 142, Na 135, K 3.9, BUN 53, sCr 1.46, alk phos 83, ALT 16, AST 14, bilirubin 0.6.Anemia studies were completed on day of arrival with a normal ferritin, normal iron of 39, TIBC slightly low at 221 with percent  saturation slightly low at 18%. Urine culture from 7/23/2025 with no growth.  Blood cultures x 2 no growth x 4 days, final. Patient seen by urologist-continue CBI with manual irrigation as needed.  Oxybutynin added.  Patient to go to OR 7/29/25 for cystoscopy with possible biopsy and possible resection of bladder tumor. He has received 3u PRBC total so far this admission or symptomatic anemia. Patient feels much better in terms of dyspnea on exertion and lightheadedness with hgb >9.0. Patient had cystoscopy with bladder stone lithotripsy 7/29/25, no tumor noted. Per RN doing manual irrigation but no clots noted. Patient wants to go home, Dr. Enriquez was sent a picture of red tinged urine in agarwal bag. He is ok for patient to go home today, patient asymptomatic and hgb stable. He has arranged office visit with himself for patient at 0945 tomorrow morning.        Consultations: Urology consulted- treatment options were discussed and plan of care agreed upon.    Pertinent Physical Exam At Time of Discharge  Constitutional:       General: He is not in acute distress.     Appearance: Normal appearance.   HENT:      Head: Normocephalic and atraumatic.      Right Ear: External ear normal.      Left Ear: External ear normal.      Nose: Nose normal.      Mouth/Throat:      Mouth: Mucous membranes are moist.      Pharynx: Oropharynx is clear.      Eyes:      Extraocular Movements: Extraocular movements intact.      Conjunctiva/sclera: Conjunctivae normal.      Pupils: Pupils are equal, round, and reactive to light.         Cardiovascular:      Rate and Rhythm: Normal rate and regular rhythm.      Pulses: Normal pulses.      Heart sounds: Normal heart sounds.   Pulmonary:      Effort: Pulmonary effort is normal. No respiratory distress.      Breath sounds: Normal breath sounds. No wheezing, rhonchi or rales.   Abdominal:      General: Bowel sounds are normal.      Palpations: Abdomen is soft.      Tenderness: There is no  abdominal tenderness. There is no right CVA tenderness, left CVA tenderness, guarding or rebound.   Genitourinary:     Comments: Guadarrama with light red urine     Musculoskeletal:         General: No swelling. Normal range of motion.      Cervical back: Normal range of motion and neck supple.      Skin:     General: Skin is warm and dry.      Capillary Refill: Capillary refill takes less than 2 seconds.      Findings: No rash.      Neurological:      General: No focal deficit present.      Mental Status: He is alert and oriented to person, place, and time. Mental status is at baseline.      Psychiatric:         Mood and Affect: Mood normal.         Behavior: Behavior normal.     Home Medications     Medication List      START taking these medications     cyanocobalamin 1,000 mcg tablet; Commonly known as: Vitamin B-12; Take 1   tablet (1,000 mcg) by mouth once daily.; Start taking on: July 31, 2025   folic acid 1 mg tablet; Commonly known as: Folvite; Take 1 tablet (1 mg)   by mouth once daily.; Start taking on: July 31, 2025   oxyBUTYnin 5 mg tablet; Commonly known as: Ditropan; Take 1 tablet (5   mg) by mouth 2 times a day.   polyethylene glycol 17 gram/dose powder; Commonly known as: Glycolax,   Miralax; Mix 17 g (one capful) of powder into 6-8 ounces of liquid and   drink once daily.; Start taking on: July 31, 2025     CONTINUE taking these medications     ciprofloxacin 500 mg tablet; Commonly known as: Cipro; Take 1 tablet   (500 mg) by mouth 2 times a day for 7 days.   fluticasone 50 mcg/actuation nasal spray; Commonly known as: Flonase;   Administer 1 spray into each nostril once daily. Shake gently. Before   first use, prime pump. After use, clean tip and replace cap.   gabapentin 300 mg capsule; Commonly known as: Neurontin   PRESERVISION LUTEIN ORAL   SAW PALMETTO ORAL   simvastatin 20 mg tablet; Commonly known as: Zocor; Take 1 tablet (20   mg) by mouth once daily.   tamsulosin 0.4 mg 24 hr capsule; Commonly  known as: Flomax; TAKE 1   CAPSULE BY MOUTH DAILY     STOP taking these medications     ibuprofen 200 mg tablet       Outpatient Follow-Up  Future Appointments   Date Time Provider Department Center   7/31/2025  9:45 AM MD Joe BorgesmiStefani [facility]   8/7/2025  2:15 PM MD Jovany Borges   12/2/2025  7:00 AM Tre Cooper MD [id number] [facility]         At the time of discharge, patient's pain was controlled with oral analgesia, patient was urinating, having BMs, sleeping, and eating well. Follow up recommendations are in discharge paperwork. Discharge plan was discussed with the patient/family and all of the questions were answered. Medications were ordered to be delivered to bedside prior to discharge.     Discharge planning took greater than 35 minutes    Diagnoses at time of discharge:  CAUTI-POA-Rocephin 1 g daily  BPH with obstructive uropathy with LUTS and bladder spasm  Gross hematuria  Acute blood loss anemia, symptomatic  Hypertension, hyperlipidemia  Peripheral neuropathy  Code Status: Full Code              Anticipated discharge destination: home    Please see orders for more complete plan    Ankita Hernandez PA-C

## 2025-07-30 NOTE — PROGRESS NOTES
Benjamin Mae is a 80 y.o. male on day 7 of admission presenting with Hematuria, unspecified type.      Subjective   Benjamin Mae is a 80 y.o. 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  Patient had been seen in the emergency department over the weekend with complaint of hematuria.  A three-way catheter has been inserted patient was irrigated patient wanted to go home and follow-up with his urologist on Monday.  The patient had been discharged to home and returned again over the weekend to the emergency department with similar issues the Agarwal catheter was not draining and the patient could not get it to improve.  Had additional irrigation performed and followed up with urology on Monday.  His catheter was evaluated by urology and irrigated clots were removed patient was placed on Cipro.  This morning the patient returns to the hospital again with abdominal pain and his Agarwal catheter not draining.  Patient was seen Agarwal catheter irrigated set up with CBI patient now having ongoing hematuria.  He does complain of some chills as well as weakness unable to get up off the couch.  No severe abdominal pain after having received some pain medicine prior to arrival. CBC with WBC 20.6, Hgb 9.6, Plts 315. CMP with glucose 142, Na 135, K 3.9, BUN 53, sCr 1.46, alk phos 83, ALT 16, AST 14, bilirubin 0.6.Anemia studies were completed on day of arrival with a normal ferritin, normal iron of 39, TIBC slightly low at 221 with percent saturation slightly low at 18%. Urine culture from 7/23/2025 with no growth.  Blood cultures x 2 no growth x 4 days, final. Patient seen by urologist-continue CBI with manual irrigation as needed.  Oxybutynin added.  Patient to go to OR 7/29/25 for cystoscopy with possible biopsy and possible resection of bladder tumor. He has received 3u PRBC total so far this admission or  symptomatic anemia. Patient feels much better in terms of dyspnea on exertion and lightheadedness with hgb >9.0    7/30/2025: No acute events overnight. Vitals stable, afebrile.  CBC reviewed, hemoglobin is 9.3. Patient had cystoscopy with bladder stone lithotripsy 7/29/25, no tumor noted. Per RN doing manual irrigation but no clots noted. Patient wants to go home, Dr. Enriquez was sent a picture of red tinged urine in agarwal bag. He is ok for patient to go home today, patient asymptomatic and hgb stable. He has arranged office visit with himself for patient at 0945 tomorrow morning.        Rounded on patient on this date with Pharmacist, RN, and Pharmacy student(s)     Review of Systems   Constitutional:  Negative for appetite change, chills, diaphoresis, fatigue and fever.   HENT:  Negative for congestion, ear pain, facial swelling, hearing loss, nosebleeds, sore throat, tinnitus and trouble swallowing.    Eyes:  Negative for pain.   Respiratory:  Positive for shortness of breath. Negative for cough, chest tightness and wheezing.    Cardiovascular:  Negative for chest pain, palpitations and leg swelling.   Gastrointestinal:  Negative for abdominal pain, blood in stool, constipation, diarrhea, nausea and vomiting.   Genitourinary:  Negative for dysuria, flank pain, frequency, hematuria and urgency.   Musculoskeletal:  Negative for back pain and joint swelling.   Skin:  Negative for rash and wound.   Neurological:  Positive for light-headedness. Negative for dizziness, syncope, weakness, numbness and headaches.   Hematological:  Does not bruise/bleed easily.   Psychiatric/Behavioral:  Negative for behavioral problems, hallucinations and suicidal ideas.           Objective     Last Recorded Vitals  /58 (BP Location: Left arm, Patient Position: Lying)   Pulse 59   Temp 36.6 °C (97.9 °F) (Temporal)   Resp 16   Wt 77.1 kg (170 lb)   SpO2 95%     Image Results  Imaging  No results found.    Cardiology, Vascular,  and Other Imaging  ECG 12 lead  Result Date: 7/29/2025  Sinus rhythm Borderline left axis deviation Abnormal R-wave progression, early transition Borderline ST elevation, anterior leads Baseline wander in lead(s) V4 Confirmed by Mart Kenyon ([id number]) on 7/29/2025 4:46:12 PM         Lab Results  Results for orders placed or performed during the hospital encounter of 07/23/25 (from the past 24 hours)   Prepare RBC: 1 Units   Result Value Ref Range    PRODUCT CODE E4075D20     Unit Number [id number]     Unit ABO O     Unit RH POS     XM INTEP COMP     Dispense Status XM     Blood Expiration Date 8/25/2025 11:59:00 PM EDT     PRODUCT BLOOD TYPE 5100     UNIT VOLUME 285    ECG 12 lead   Result Value Ref Range    Ventricular Rate 57 BPM    Atrial Rate 57 BPM    NE Interval 132 ms    QRS Duration 101 ms    QT Interval 403 ms    QTC Calculation(Bazett) 393 ms    P Axis 8 degrees    R Axis -26 degrees    T Axis 41 degrees    QRS Count 10 beats    Q Onset 257 ms    T Offset 458 ms    QTC Fredericia 396 ms   Basic Metabolic Panel   Result Value Ref Range    Glucose 128 (H) 74 - 99 mg/dL    Sodium 136 136 - 145 mmol/L    Potassium 4.6 3.5 - 5.3 mmol/L    Chloride 105 98 - 107 mmol/L    Bicarbonate 24 21 - 32 mmol/L    Anion Gap 12 10 - 20 mmol/L    Urea Nitrogen 21 6 - 23 mg/dL    Creatinine 0.87 0.50 - 1.30 mg/dL    eGFR 86 >60 mL/min/1.73m*2    Calcium 7.9 (L) 8.6 - 10.3 mg/dL   CBC   Result Value Ref Range    WBC 16.8 (H) 4.4 - 11.3 x10*3/uL    nRBC 0.0 0.0 - 0.0 /100 WBCs    RBC 3.04 (L) 4.50 - 5.90 x10*6/uL    Hemoglobin 9.3 (L) 13.5 - 17.5 g/dL    Hematocrit 28.3 (L) 41.0 - 52.0 %    MCV 93 80 - 100 fL    MCH 30.6 26.0 - 34.0 pg    MCHC 32.9 32.0 - 36.0 g/dL    RDW 14.4 11.5 - 14.5 %    Platelets 304 150 - 450 x10*3/uL   Magnesium   Result Value Ref Range    Magnesium 2.08 1.60 - 2.40 mg/dL        Medications  Scheduled medications:  Scheduled Medications[1]  Continuous medications:  Continuous Medications[2]  PRN  medications:  PRN Medications[3]     Physical Exam  Constitutional:       General: He is not in acute distress.     Appearance: Normal appearance.   HENT:      Head: Normocephalic and atraumatic.      Right Ear: External ear normal.      Left Ear: External ear normal.      Nose: Nose normal.      Mouth/Throat:      Mouth: Mucous membranes are moist.      Pharynx: Oropharynx is clear.     Eyes:      Extraocular Movements: Extraocular movements intact.      Conjunctiva/sclera: Conjunctivae normal.      Pupils: Pupils are equal, round, and reactive to light.       Cardiovascular:      Rate and Rhythm: Normal rate and regular rhythm.      Pulses: Normal pulses.      Heart sounds: Normal heart sounds.   Pulmonary:      Effort: Pulmonary effort is normal. No respiratory distress.      Breath sounds: Normal breath sounds. No wheezing, rhonchi or rales.   Abdominal:      General: Bowel sounds are normal.      Palpations: Abdomen is soft.      Tenderness: There is no abdominal tenderness. There is no right CVA tenderness, left CVA tenderness, guarding or rebound.   Genitourinary:     Comments: Guadarrama with light red urine    Musculoskeletal:         General: No swelling. Normal range of motion.      Cervical back: Normal range of motion and neck supple.     Skin:     General: Skin is warm and dry.      Capillary Refill: Capillary refill takes less than 2 seconds.      Findings: No rash.     Neurological:      General: No focal deficit present.      Mental Status: He is alert and oriented to person, place, and time. Mental status is at baseline.     Psychiatric:         Mood and Affect: Mood normal.         Behavior: Behavior normal.                  Assessment/Plan        This patient has a urinary catheter   Reason for the urinary catheter remaining today? urinary retention/bladder outlet obstruction, acute or chronic    CAUTI-POA  Urine culture from 7/23/2025 with no growth  Blood cultures x 2 no growth x 4 days, final  Note  the patient was treated with Cipro prior to arrival   Completed 5d rocephin 7/28/25    BPH with obstructive uropathy with LUTS bladder spasm  Oxybutynin 5 mg 2 times daily, Flomax 0.4 mg daily    Gross hematuria  Acute blood loss anemia. symptomatic  Monitor H/H  Transfuse as needed-appears patient was given blood transfusion of 1 unit PRBC on 7/24/2025 and additional 1 unit PRBC on 7/25/2025 given patient was dizzy with ambulating leading to symptomatic anemia. Transfused additional 1 unit PRBC on 7/28/2025 given patient lightheaded and ERIC  CBI with three-way catheter-urine cleared to light yellow on 7/28/2025  Urology consulted- Patient seen by urologist-continue CBI with manual irrigation as needed.  Oxybutynin added.    Supplement B12 and folate  Iron studies noted  Patient had cystoscopy with bladder stone lithotripsy 7/29/25, no tumor noted  Continue three-way Guadarrama catheter with third port plugged  Okay DC home with Guadarrama catheter and outpatient voiding trial per Dr. Enriquez 7/30/25    Hypertension, hyperlipidemia  Hold off home antihypertensive in setting of acute blood loss anemia-monitor BP and adjust as necessary  Continue patient's home simvastatin 20 mg nightly    Peripheral neuropathy  Continue gabapentin 300 mg 4 times daily    Code Status: Full Code                 DVT ppx: SCDs, defer chemoprophylaxis given acute hematuria with acute blood loss anemia     Please see orders for more complete plan    Ankita Hernandez PA-C             [1] cyanocobalamin, 1,000 mcg, oral, Daily  folic acid, 1 mg, oral, Daily  gabapentin, 300 mg, oral, 4x daily  oxyBUTYnin, 5 mg, oral, BID  pantoprazole, 40 mg, oral, Daily before breakfast   Or  pantoprazole, 40 mg, intravenous, Daily before breakfast  polyethylene glycol, 17 g, oral, Daily  simvastatin, 20 mg, oral, Nightly  tamsulosin, 0.4 mg, oral, Daily     [2] sodium chloride, 3,000 mL     [3] PRN medications: bisacodyl, bisacodyl, guaiFENesin,  HYDROcodone-acetaminophen, melatonin, morphine, ondansetron ODT **OR** ondansetron

## 2025-07-31 ENCOUNTER — OFFICE VISIT (OUTPATIENT)
Dept: UROLOGY | Facility: CLINIC | Age: 80
End: 2025-07-31
Payer: MEDICARE

## 2025-07-31 ENCOUNTER — PATIENT OUTREACH (OUTPATIENT)
Dept: PRIMARY CARE | Facility: CLINIC | Age: 80
End: 2025-07-31

## 2025-07-31 DIAGNOSIS — N13.8 BENIGN PROSTATIC HYPERPLASIA WITH URINARY OBSTRUCTION AND OTHER LOWER URINARY TRACT SYMPTOMS: ICD-10-CM

## 2025-07-31 DIAGNOSIS — R31.0 GROSS HEMATURIA: ICD-10-CM

## 2025-07-31 DIAGNOSIS — N40.1 BENIGN PROSTATIC HYPERPLASIA WITH URINARY OBSTRUCTION AND OTHER LOWER URINARY TRACT SYMPTOMS: ICD-10-CM

## 2025-07-31 DIAGNOSIS — R33.9 URINE RETENTION: Primary | ICD-10-CM

## 2025-07-31 DIAGNOSIS — N21.0 BLADDER STONE: ICD-10-CM

## 2025-07-31 PROCEDURE — 1111F DSCHRG MED/CURRENT MED MERGE: CPT | Performed by: UROLOGY

## 2025-07-31 PROCEDURE — 99213 OFFICE O/P EST LOW 20 MIN: CPT | Performed by: UROLOGY

## 2025-07-31 NOTE — PATIENT INSTRUCTIONS
Impression  Bladder stone multiple status post lithotripsy 7/29/2025  Clot retention  BPH  Urinary retention  Chronic Guadarrama catheter  Elevated PSA, negative biopsy x 2     Plan  Voiding trial today, call back 3 PM  Continue Cipro  Continue Flomax  Appointment in a week for PVR

## 2025-07-31 NOTE — PROGRESS NOTES
Discharge Facility: [facility]  Discharge Diagnosis:Hematuria  Admission Date:7/23/25  Discharge Date: 7/30/25    PCP Appointment Date:none made patient would like pcp office to call him to schedule sent to pcp office  Specialist Appointment Date:   Hospital Encounter and Summary Linked: Yes/  See discharge assessment below for further details  ED to Hosp-Admission (Discharged) with Michael Galan MD PhD; Neville Bansal DO (07/23/2025)   Wrap Up  Wrap Up Additional Comments: discused disccharge patient stated yhat he is improving. provided contact information encouraged call with questions. (7/31/2025  9:00 AM)    Engagement  Call Start Time: 0900 (7/31/2025  9:00 AM)    Medications  Medications reviewed with patient/caregiver?: Yes (B12 folic acid ditropan) (7/31/2025  9:00 AM)  Is the patient having any side effects they believe may be caused by any medication additions or changes?: No (7/31/2025  9:00 AM)  Does the patient have all medications ordered at discharge?: Yes (7/31/2025  9:00 AM)  Is the patient taking all medications as directed (includes completed medication regime)?: Yes (7/31/2025  9:00 AM)    Appointments  Does the patient have a primary care provider?: Yes (7/31/2025  9:00 AM)  Care Management Interventions: Advised patient to make appointment (7/31/2025  9:00 AM)  Has the patient kept scheduled appointments due by today?: Yes (7/31/2025  9:00 AM)    Self Management  What is the home health agency?: N/A (7/31/2025  9:00 AM)  What Durable Medical Equipment (DME) was ordered?: N/A (7/31/2025  9:00 AM)  Has all Durable Medical Equipment (DME) been delivered?: No (7/31/2025  9:00 AM)    Patient Teaching  Does the patient have access to their discharge instructions?: Yes (7/31/2025  9:00 AM)  Care Management Interventions: Reviewed instructions with patient (7/31/2025  9:00 AM)  What is the patient's perception of their health status since discharge?: Improving (7/31/2025  9:00 AM)  Is the  patient/caregiver able to teach back the hierarchy of who to call/visit for symptoms/problems? PCP, Specialist, Home Health nurse, Urgent Care, ED, 911: Yes (7/31/2025  9:00 AM)

## 2025-07-31 NOTE — PROGRESS NOTES
07/31/2025  Did okay after cystoscopy bladder stone lithotripsy.  Was discharged from hospital yesterday here for voiding trial    Exam: Ugadarrama catheter in place three-way Simplastic, urine light pink without clots    Guadarrama catheter was removed without difficulty    We discussed bladder stone status post lithotripsy  We discussed very large prostate severe trabeculated bladder  We discussed voiding trial today, possible BPH workup in the future  All the questions were answered, the patient expressed understanding and agreed to the plan.    Impression  Bladder stone multiple status post lithotripsy 7/29/2025  Clot retention  BPH  Urinary retention  Chronic Guadarrama catheter  Elevated PSA, negative biopsy x 2     Plan  Voiding trial today, call back 3 PM  Continue Cipro  Continue Flomax  Appointment in a week for PVR    Chief Complaint   Patient presents with    Post-op     Pt is here today for a post op voiding trial. He denies any issues after surgery.         Physical Exam     TODAYS LAB RESULTS:      ASSESSMENT&PLAN:      IMPRESSIONS:         07/21/2025  Patient developed gross hematuria, a three-way Guadarrama catheter was inserted in the ER yesterday     Exam: Three-way Guadarrama catheter in place, urine dark red     Manual irrigation was performed a large amount of old clots evacuated     PSA  11/25/2024 13.61  11/27/2023 11.2  5/23/2023 18.88     We discussed benign proximal hypertrophy, chronic Guadarrama catheter in the wearing  We discussed Guadarrama catheter trauma, gross hematuria clot retention  All the questions were answered, the patient expressed understanding and agreed to the plan.     Impression  Clot retention  BPH  Urinary retention  Chronic Guadarrama catheter  Elevated PSA, negative biopsy x 2     Plan  2-way Simplastic catheter  Increase liquid intake  Cipro 500 mg twice a day for 7 days  Appointment in 1 week for voiding trial  Call if problem          Chief Complaint   Patient presents with    Blood in Urine        Patient is here today for er follow up he went to ER on Friday and yesterday for hematuria and pain in his penis.          Physical Exam      TODAYS LAB RESULTS:  CBC  Order: [id number]   Status: Final result    Test Result Released: No (inaccessible in [facility])    0 Result Notes              Component  Ref Range & Units 1 d ago 3 yr ago 5 yr ago 6 yr ago   WBC  4.4 - 11.3 x10*3/uL 13.3 High  10.1 R 7.1 R 6.1 R   nRBC  0.0 - 0.0 /100 WBCs 0.0     0.0 R   RBC  4.50 - 5.90 x10*6/uL 4.53 4.29 Low  R 5.20 R 5.10 R   Hemoglobin  13.5 - 17.5 g/dL 13.9 13.0 Low  15.5 15.7   Hematocrit  41.0 - 52.0 % 41.2 37.2 Low  47.8 48.6   MCV  80 - 100 fL 91 87 92 95   MCH  26.0 - 34.0 pg 30.7         MCHC  32.0 - 36.0 g/dL 33.7 34.9 32.4 32.3   RDW  11.5 - 14.5 % 13.3 12.8 13.0 13.2   Platelets  150 - 450 x10*3/uL 203 270 R 234 R 254 R   Resulting Agency [facility]                  Lab Results   Component Value Date     PSA 13.61 (H) 11/25/2024     PSA 11.20 (H) 11/27/2023     PSA 18.88 (H) 05/23/2023         ASSESSMENT&PLAN:        IMPRESSIONS:           10/14/2024  Patient here for Agarwal catheter change     Catheter was changed without difficulty by nurse     We discussed elevated PSA, prostate biopsy negative x 2 in the past  We discussed continue monitoring PSA versus no more PSA due to the age  All the questions were answered, the patient expressed understanding and agreed to the plan.     Impression  BPH  Urinary retention  Chronic Agarwal catheter  Elevated PSA, negative biopsy x 2     Plan  Continue Agarwal catheter monthly change  No more PSA             Chief Complaint   Patient presents with    4 week cath change        Pt here for his 4 week cath change             Patient here today for 4 wk indwelling agarwal catheter change. Removed and replaced with 16 [place] Agarwal 10cc balloon Patient prepped with iodine. Catheter draining clear yellow urine and able to be flushed appropriately.  Patient tolerated well. Patient to return in 4 wks for next catheter change.   Liz Gallardo NMR-CMA     Physical Exam      TODAYS LAB RESULTS:        ASSESSMENT&PLAN:        IMPRESSIONS:       11/20/2023 HBM  78-year-old male with a history of a neurogenic bladder with chronic indwelling Guadarrama catheter which is changed every 4 weeks by our office personnel presents today for Guadarrama catheter exchange.  The patient denies having any difficulties with the Guadararma catheter since he was last here he denies blood or burning on urination, his Guadarrama catheter was successfully changed by the office personnel and he is to return to the office for Guadarrama catheter exchange in 4 weeks.            PSA  11/25/2024 13.61  11/27/2023 11.2  5/23/2023 18.88  7/28/2022 12.85  4/19 2021 8.49  7/19/2020 7.43  5/11/2020 6.85     ......       Surgery  7/29/2025 cystoscopy bladder stone lithotripsy catheter change

## 2025-08-02 LAB
BLOOD EXPIRATION DATE: NORMAL
DISPENSE STATUS: NORMAL
PRODUCT BLOOD TYPE: 5100
PRODUCT CODE: NORMAL
UNIT ABO: NORMAL
UNIT NUMBER: NORMAL
UNIT RH: NORMAL
UNIT VOLUME: 285
XM INTEP: NORMAL

## 2025-08-03 LAB
BACTERIA SPEC CULT: ABNORMAL
GRAM STN SPEC: ABNORMAL
GRAM STN SPEC: ABNORMAL

## 2025-08-06 LAB
BACTERIA SPEC CULT: ABNORMAL
GRAM STN SPEC: ABNORMAL
GRAM STN SPEC: ABNORMAL

## 2025-08-07 ENCOUNTER — APPOINTMENT (OUTPATIENT)
Dept: UROLOGY | Facility: CLINIC | Age: 80
End: 2025-08-07
Payer: MEDICARE

## 2025-08-07 DIAGNOSIS — N40.1 BENIGN PROSTATIC HYPERPLASIA WITH URINARY OBSTRUCTION AND OTHER LOWER URINARY TRACT SYMPTOMS: ICD-10-CM

## 2025-08-07 DIAGNOSIS — R33.9 URINE RETENTION: Primary | ICD-10-CM

## 2025-08-07 DIAGNOSIS — R33.9 URINARY RETENTION: ICD-10-CM

## 2025-08-07 DIAGNOSIS — N13.8 BENIGN PROSTATIC HYPERPLASIA WITH URINARY OBSTRUCTION AND OTHER LOWER URINARY TRACT SYMPTOMS: ICD-10-CM

## 2025-08-07 DIAGNOSIS — N21.0 BLADDER STONE: ICD-10-CM

## 2025-08-07 DIAGNOSIS — R31.0 GROSS HEMATURIA: ICD-10-CM

## 2025-08-07 DIAGNOSIS — R97.20 ELEVATED PSA: ICD-10-CM

## 2025-08-07 PROCEDURE — 1111F DSCHRG MED/CURRENT MED MERGE: CPT | Performed by: UROLOGY

## 2025-08-07 PROCEDURE — 1159F MED LIST DOCD IN RCRD: CPT | Performed by: UROLOGY

## 2025-08-07 PROCEDURE — 99213 OFFICE O/P EST LOW 20 MIN: CPT | Performed by: UROLOGY

## 2025-08-07 NOTE — PATIENT INSTRUCTIONS
Impression  Bladder stone multiple status post lithotripsy 7/29/2025  Clot retention  BPH  Urinary retention  Chronic Guadarrama catheter  Elevated PSA, negative biopsy x 2     Plan  Continue Guadarrama, monthly change  PSA check

## 2025-08-07 NOTE — PROGRESS NOTES
Chief Complaint   Patient presents with    Urinary Retention     Pt is here today for a follow up. He states he has no more gross hematuria in his catheter or bag. He states he is feeling better.    08/07/2025  Patient tolerated with Guadarrama catheter well and wanted to keep it.  Normal bleeding    We discussed chronic Guadarrama catheter into wearing monthly change  We discussed elevated PSA continue monitoring  All the questions were answered, the patient expressed understanding and agreed to the plan.    Impression  Bladder stone multiple status post lithotripsy 7/29/2025  Clot retention  BPH  Urinary retention  Chronic Guadarrama catheter  Elevated PSA, negative biopsy x 2     Plan  Continue Guadarrama, monthly change  PSA check     Physical Exam     TODAYS LAB RESULTS:  Lab Results   Component Value Date    PSA 13.61 (H) 11/25/2024    PSA 11.20 (H) 11/27/2023    PSA 18.88 (H) 05/23/2023         ASSESSMENT&PLAN:      IMPRESSIONS:         07/31/2025  Did okay after cystoscopy bladder stone lithotripsy.  Was discharged from hospital yesterday here for voiding trial     Exam: Guadarrama catheter in place three-way Simplastic, urine light pink without clots     Guadarrama catheter was removed without difficulty     We discussed bladder stone status post lithotripsy  We discussed very large prostate severe trabeculated bladder  We discussed voiding trial today, possible BPH workup in the future  All the questions were answered, the patient expressed understanding and agreed to the plan.     Impression  Bladder stone multiple status post lithotripsy 7/29/2025  Clot retention  BPH  Urinary retention  Chronic Guadarrama catheter  Elevated PSA, negative biopsy x 2     Plan  Voiding trial today, call back 3 PM  Continue Cipro  Continue Flomax  Appointment in a week for PVR          Chief Complaint   Patient presents with    Post-op       Pt is here today for a post op voiding trial. He denies any issues after surgery.          Physical Exam      TODAYS  LAB RESULTS:        ASSESSMENT&PLAN:        IMPRESSIONS:          07/21/2025  Patient developed gross hematuria, a three-way Guadarrama catheter was inserted in the ER yesterday     Exam: Three-way Guadarrama catheter in place, urine dark red     Manual irrigation was performed a large amount of old clots evacuated     PSA  11/25/2024 13.61  11/27/2023 11.2  5/23/2023 18.88     We discussed benign proximal hypertrophy, chronic Guadarrama catheter in the wearing  We discussed Guadarrama catheter trauma, gross hematuria clot retention  All the questions were answered, the patient expressed understanding and agreed to the plan.     Impression  Clot retention  BPH  Urinary retention  Chronic Guadarrama catheter  Elevated PSA, negative biopsy x 2     Plan  2-way Simplastic catheter  Increase liquid intake  Cipro 500 mg twice a day for 7 days  Appointment in 1 week for voiding trial  Call if problem             Chief Complaint   Patient presents with    Blood in Urine       Patient is here today for er follow up he went to ER on Friday and yesterday for hematuria and pain in his penis.          Physical Exam      TODAYS LAB RESULTS:  CBC  Order: [id number]   Status: Final result    Test Result Released: No (inaccessible in [facility])    0 Result Notes              Component  Ref Range & Units 1 d ago 3 yr ago 5 yr ago 6 yr ago   WBC  4.4 - 11.3 x10*3/uL 13.3 High  10.1 R 7.1 R 6.1 R   nRBC  0.0 - 0.0 /100 WBCs 0.0     0.0 R   RBC  4.50 - 5.90 x10*6/uL 4.53 4.29 Low  R 5.20 R 5.10 R   Hemoglobin  13.5 - 17.5 g/dL 13.9 13.0 Low  15.5 15.7   Hematocrit  41.0 - 52.0 % 41.2 37.2 Low  47.8 48.6   MCV  80 - 100 fL 91 87 92 95   MCH  26.0 - 34.0 pg 30.7         MCHC  32.0 - 36.0 g/dL 33.7 34.9 32.4 32.3   RDW  11.5 - 14.5 % 13.3 12.8 13.0 13.2   Platelets  150 - 450 x10*3/uL 203 270 R 234 R 254 R   Resulting Agency [facility]                      Lab Results   Component Value Date     PSA 13.61 (H) 11/25/2024      PSA 11.20 (H) 11/27/2023     PSA 18.88 (H) 05/23/2023         ASSESSMENT&PLAN:        IMPRESSIONS:           10/14/2024  Patient here for Agarwal catheter change     Catheter was changed without difficulty by nurse     We discussed elevated PSA, prostate biopsy negative x 2 in the past  We discussed continue monitoring PSA versus no more PSA due to the age  All the questions were answered, the patient expressed understanding and agreed to the plan.     Impression  BPH  Urinary retention  Chronic Agarwal catheter  Elevated PSA, negative biopsy x 2     Plan  Continue Agarwal catheter monthly change  No more PSA             Chief Complaint   Patient presents with    4 week cath change        Pt here for his 4 week cath change             Patient here today for 4 wk indwelling agarwal catheter change. Removed and replaced with 16 [place] Agarwal 10cc balloon Patient prepped with iodine. Catheter draining clear yellow urine and able to be flushed appropriately. Patient tolerated well. Patient to return in 4 wks for next catheter change.   Liz Gallardo NMR-CMA     Physical Exam      TODAYS LAB RESULTS:        ASSESSMENT&PLAN:        IMPRESSIONS:       11/20/2023 HBM  78-year-old male with a history of a neurogenic bladder with chronic indwelling Agarwal catheter which is changed every 4 weeks by our office personnel presents today for Agarwal catheter exchange.  The patient denies having any difficulties with the Agarwal catheter since he was last here he denies blood or burning on urination, his Agarwal catheter was successfully changed by the office personnel and he is to return to the office for Agarwal catheter exchange in 4 weeks.            PSA  11/25/2024 13.61  11/27/2023 11.2  5/23/2023 18.88  7/28/2022 12.85  4/19 2021 8.49  7/19/2020 7.43  5/11/2020 6.85     ......        Surgery  7/29/2025 cystoscopy bladder stone lithotripsy catheter change

## 2025-08-18 ENCOUNTER — PATIENT OUTREACH (OUTPATIENT)
Dept: PRIMARY CARE | Facility: CLINIC | Age: 80
End: 2025-08-18
Payer: MEDICARE

## 2025-08-27 ENCOUNTER — PATIENT OUTREACH (OUTPATIENT)
Dept: PRIMARY CARE | Facility: CLINIC | Age: 80
End: 2025-08-27
Payer: MEDICARE

## 2025-09-05 ENCOUNTER — APPOINTMENT (OUTPATIENT)
Dept: UROLOGY | Facility: CLINIC | Age: 80
End: 2025-09-05
Payer: MEDICARE

## 2025-10-06 ENCOUNTER — APPOINTMENT (OUTPATIENT)
Dept: UROLOGY | Facility: CLINIC | Age: 80
End: 2025-10-06
Payer: MEDICARE

## 2025-12-02 ENCOUNTER — APPOINTMENT (OUTPATIENT)
Dept: PRIMARY CARE | Facility: CLINIC | Age: 80
End: 2025-12-02
Payer: MEDICARE

## (undated) DEVICE — DRESSING, GAUZE, SPONGE, VERSALON, ALL PURPOSE, 4 X 4 IN, SOFT

## (undated) DEVICE — Device

## (undated) DEVICE — IRRIGATION KIT, TURP, Y, LARGE BORE, 81 IN

## (undated) DEVICE — SOLUTION, IRRIGATION, USP, STERILE WATER, UROLOGICAL, 3000 ML, BAG

## (undated) DEVICE — GOWN, SURGICAL, UROLOGY, IMPERVIOUS, XLONG, XLARGE, DISPOSABLE

## (undated) DEVICE — PLUG, CATHETER

## (undated) DEVICE — SYRINGE, 50 CC, IRRIGATION, CATHETER TIP, DISPOSABLE, STERILE, LF

## (undated) DEVICE — BAG, DRAINAGE, ANTI-REFLUX CHAMBER, 2000ML

## (undated) DEVICE — CABLE, HF MONOPOLAR, 4M SINGLE USE

## (undated) DEVICE — PAD, GROUNDING, ELECTROSURGICAL, W/9 FT CABLE, POLYHESIVE II, ADULT, LF

## (undated) DEVICE — SOLUTION, IRRIGATION, STERILE WATER, 1000 ML, POUR BOTTLE

## (undated) DEVICE — GLOVE, PROTEXIS PI CLASSIC, SZ-7.0, PF, LF

## (undated) DEVICE — PREP TRAY, SKIN, DRY, W/GLOVES

## (undated) DEVICE — SOLUTION, PREP, PVP IODINE, FLIP TOP, 4OZ

## (undated) DEVICE — LUBRICANT, WATER SOLUBLE, BACTERIOSTATIC, 2 OZ, STERILE